# Patient Record
Sex: FEMALE | Race: BLACK OR AFRICAN AMERICAN | NOT HISPANIC OR LATINO | Employment: FULL TIME | URBAN - METROPOLITAN AREA
[De-identification: names, ages, dates, MRNs, and addresses within clinical notes are randomized per-mention and may not be internally consistent; named-entity substitution may affect disease eponyms.]

---

## 2017-04-17 ENCOUNTER — COMMUNICATION - HEALTHEAST (OUTPATIENT)
Dept: SURGERY | Facility: CLINIC | Age: 57
End: 2017-04-17

## 2017-04-17 DIAGNOSIS — K90.9 INTESTINAL MALABSORPTION: ICD-10-CM

## 2017-04-17 DIAGNOSIS — Z98.84 BARIATRIC SURGERY STATUS: ICD-10-CM

## 2017-05-01 ENCOUNTER — OFFICE VISIT - HEALTHEAST (OUTPATIENT)
Dept: SURGERY | Facility: CLINIC | Age: 57
End: 2017-05-01

## 2017-05-01 ENCOUNTER — AMBULATORY - HEALTHEAST (OUTPATIENT)
Dept: LAB | Facility: CLINIC | Age: 57
End: 2017-05-01

## 2017-05-01 DIAGNOSIS — E66.811 OBESITY (BMI 30.0-34.9): ICD-10-CM

## 2017-05-01 DIAGNOSIS — K91.2 POSTOPERATIVE MALABSORPTION: ICD-10-CM

## 2017-05-01 LAB
FASTING STATUS PATIENT QL REPORTED: NORMAL
HBA1C MFR BLD: 5.8 % (ref 4.2–6.1)
HDLC SERPL-MCNC: 88 MG/DL
LDLC SERPL CALC-MCNC: 88 MG/DL

## 2017-05-01 RX ORDER — VENLAFAXINE HYDROCHLORIDE 150 MG/1
1 CAPSULE, EXTENDED RELEASE ORAL DAILY
Refills: 11 | Status: SHIPPED | COMMUNITY
Start: 2017-04-04

## 2017-05-01 RX ORDER — 1.1% SODIUM FLUORIDE PRESCRIPTION DENTAL CREAM 5 MG/G
1 CREAM DENTAL PRN
Refills: 0 | Status: SHIPPED | COMMUNITY
Start: 2017-04-05

## 2017-05-01 RX ORDER — LEVETIRACETAM 1000 MG/1
1 TABLET ORAL 2 TIMES DAILY
Refills: 3 | Status: SHIPPED | COMMUNITY
Start: 2017-04-14

## 2017-05-01 ASSESSMENT — MIFFLIN-ST. JEOR: SCORE: 1466.53

## 2017-05-08 ENCOUNTER — AMBULATORY - HEALTHEAST (OUTPATIENT)
Dept: SURGERY | Facility: CLINIC | Age: 57
End: 2017-05-08

## 2017-05-08 DIAGNOSIS — R79.89 HIGH SERUM PARATHYROID HORMONE (PTH): ICD-10-CM

## 2017-05-08 DIAGNOSIS — K91.2 POSTOPERATIVE MALABSORPTION: ICD-10-CM

## 2017-05-17 ENCOUNTER — RECORDS - HEALTHEAST (OUTPATIENT)
Dept: ADMINISTRATIVE | Facility: OTHER | Age: 57
End: 2017-05-17

## 2017-05-17 ENCOUNTER — RECORDS - HEALTHEAST (OUTPATIENT)
Dept: BONE DENSITY | Facility: CLINIC | Age: 57
End: 2017-05-17

## 2017-05-17 DIAGNOSIS — R79.89 OTHER SPECIFIED ABNORMAL FINDINGS OF BLOOD CHEMISTRY: ICD-10-CM

## 2017-05-17 DIAGNOSIS — K91.2 POSTSURGICAL MALABSORPTION, NOT ELSEWHERE CLASSIFIED: ICD-10-CM

## 2017-08-07 ENCOUNTER — AMBULATORY - HEALTHEAST (OUTPATIENT)
Dept: SURGERY | Facility: CLINIC | Age: 57
End: 2017-08-07

## 2017-08-07 DIAGNOSIS — E66.811 OBESITY (BMI 30.0-34.9): ICD-10-CM

## 2017-08-08 ENCOUNTER — AMBULATORY - HEALTHEAST (OUTPATIENT)
Dept: SURGERY | Facility: CLINIC | Age: 57
End: 2017-08-08

## 2017-08-08 DIAGNOSIS — K90.9 UNSPECIFIED INTESTINAL MALABSORPTION: ICD-10-CM

## 2017-08-08 DIAGNOSIS — E21.3 HYPERPARATHYROIDISM (H): ICD-10-CM

## 2017-08-08 DIAGNOSIS — K91.2 OTHER AND UNSPECIFIED POSTSURGICAL NONABSORPTION: ICD-10-CM

## 2017-08-08 DIAGNOSIS — E66.811 OBESITY (BMI 30.0-34.9): ICD-10-CM

## 2017-08-08 DIAGNOSIS — Z98.84 S/P BARIATRIC SURGERY: ICD-10-CM

## 2017-09-05 ENCOUNTER — AMBULATORY - HEALTHEAST (OUTPATIENT)
Dept: LAB | Facility: HOSPITAL | Age: 57
End: 2017-09-05

## 2017-09-05 DIAGNOSIS — Z98.84 S/P BARIATRIC SURGERY: ICD-10-CM

## 2017-09-05 DIAGNOSIS — K91.2 OTHER AND UNSPECIFIED POSTSURGICAL NONABSORPTION: ICD-10-CM

## 2017-09-05 DIAGNOSIS — E21.3 HYPERPARATHYROIDISM (H): ICD-10-CM

## 2017-09-05 DIAGNOSIS — K90.9 UNSPECIFIED INTESTINAL MALABSORPTION: ICD-10-CM

## 2017-09-08 ENCOUNTER — OFFICE VISIT - HEALTHEAST (OUTPATIENT)
Dept: SURGERY | Facility: CLINIC | Age: 57
End: 2017-09-08

## 2017-09-08 DIAGNOSIS — E66.9 OBESITY: ICD-10-CM

## 2017-09-08 DIAGNOSIS — K91.2 POSTOPERATIVE MALABSORPTION: ICD-10-CM

## 2017-09-08 DIAGNOSIS — E21.3 HYPERPARATHYROIDISM (H): ICD-10-CM

## 2017-09-08 ASSESSMENT — MIFFLIN-ST. JEOR: SCORE: 1498.28

## 2018-03-06 ENCOUNTER — AMBULATORY - HEALTHEAST (OUTPATIENT)
Dept: SURGERY | Facility: CLINIC | Age: 58
End: 2018-03-06

## 2018-03-06 DIAGNOSIS — K91.2 POSTSURGICAL MALABSORPTION: ICD-10-CM

## 2018-03-06 DIAGNOSIS — Z98.84 S/P BARIATRIC SURGERY: ICD-10-CM

## 2018-03-06 DIAGNOSIS — K90.9 INTESTINAL MALABSORPTION, UNSPECIFIED TYPE: ICD-10-CM

## 2018-03-07 ENCOUNTER — COMMUNICATION - HEALTHEAST (OUTPATIENT)
Dept: SURGERY | Facility: CLINIC | Age: 58
End: 2018-03-07

## 2018-03-07 ENCOUNTER — AMBULATORY - HEALTHEAST (OUTPATIENT)
Dept: LAB | Facility: HOSPITAL | Age: 58
End: 2018-03-07

## 2018-03-07 DIAGNOSIS — K90.9 INTESTINAL MALABSORPTION, UNSPECIFIED TYPE: ICD-10-CM

## 2018-03-07 DIAGNOSIS — K91.2 POSTSURGICAL MALABSORPTION: ICD-10-CM

## 2018-03-07 DIAGNOSIS — Z98.84 S/P BARIATRIC SURGERY: ICD-10-CM

## 2018-03-07 LAB
25(OH)D3 SERPL-MCNC: 54.8 NG/ML (ref 30–80)
ALBUMIN SERPL-MCNC: 3.9 G/DL (ref 3.5–5)
ALP SERPL-CCNC: 90 U/L (ref 45–120)
ALT SERPL W P-5'-P-CCNC: 27 U/L (ref 0–45)
ANION GAP SERPL CALCULATED.3IONS-SCNC: 9 MMOL/L (ref 5–18)
AST SERPL W P-5'-P-CCNC: 25 U/L (ref 0–40)
BILIRUB SERPL-MCNC: 0.7 MG/DL (ref 0–1)
BUN SERPL-MCNC: 13 MG/DL (ref 8–22)
CALCIUM SERPL-MCNC: 9.4 MG/DL (ref 8.5–10.5)
CHLORIDE BLD-SCNC: 105 MMOL/L (ref 98–107)
CHOLEST SERPL-MCNC: 168 MG/DL
CO2 SERPL-SCNC: 28 MMOL/L (ref 22–31)
CREAT SERPL-MCNC: 0.7 MG/DL (ref 0.6–1.1)
ERYTHROCYTE [DISTWIDTH] IN BLOOD BY AUTOMATED COUNT: 13.3 % (ref 11–14.5)
FASTING STATUS PATIENT QL REPORTED: YES
FERRITIN SERPL-MCNC: 44 NG/ML (ref 10–130)
FOLATE SERPL-MCNC: 19.9 NG/ML
GFR SERPL CREATININE-BSD FRML MDRD: >60 ML/MIN/1.73M2
GLUCOSE BLD-MCNC: 95 MG/DL (ref 70–125)
HCT VFR BLD AUTO: 39.5 % (ref 35–47)
HDLC SERPL-MCNC: 81 MG/DL
HGB BLD-MCNC: 12.6 G/DL (ref 12–16)
LDLC SERPL CALC-MCNC: 68 MG/DL
MCH RBC QN AUTO: 29.2 PG (ref 27–34)
MCHC RBC AUTO-ENTMCNC: 31.9 G/DL (ref 32–36)
MCV RBC AUTO: 92 FL (ref 80–100)
PLATELET # BLD AUTO: 321 THOU/UL (ref 140–440)
PMV BLD AUTO: 9.9 FL (ref 8.5–12.5)
POTASSIUM BLD-SCNC: 3.7 MMOL/L (ref 3.5–5)
PROT SERPL-MCNC: 7 G/DL (ref 6–8)
PTH-INTACT SERPL-MCNC: 88 PG/ML (ref 10–86)
RBC # BLD AUTO: 4.31 MILL/UL (ref 3.8–5.4)
SODIUM SERPL-SCNC: 142 MMOL/L (ref 136–145)
TRIGL SERPL-MCNC: 96 MG/DL
VIT B12 SERPL-MCNC: >2000 PG/ML (ref 213–816)
WBC: 8.9 THOU/UL (ref 4–11)

## 2018-03-08 LAB — ZINC SERPL-MCNC: 97 UG/DL (ref 60–120)

## 2018-03-09 ENCOUNTER — OFFICE VISIT - HEALTHEAST (OUTPATIENT)
Dept: SURGERY | Facility: CLINIC | Age: 58
End: 2018-03-09

## 2018-03-09 DIAGNOSIS — R53.83: ICD-10-CM

## 2018-03-09 DIAGNOSIS — E66.811 OBESITY (BMI 30.0-34.9): ICD-10-CM

## 2018-03-09 DIAGNOSIS — K91.2 POSTOPERATIVE MALABSORPTION: ICD-10-CM

## 2018-03-09 DIAGNOSIS — R63.2 HYPERPHAGIA: ICD-10-CM

## 2018-03-09 LAB
ANNOTATION COMMENT IMP: NORMAL
VIT A SERPL-MCNC: 0.77 MG/L (ref 0.3–1.2)
VITAMIN A (RETINYL PALMITATE): 0.03 MG/L (ref 0–0.1)

## 2018-03-09 RX ORDER — FLUOCINONIDE 0.5 MG/G
1 CREAM TOPICAL 2 TIMES DAILY
Status: SHIPPED | COMMUNITY
Start: 2017-09-19

## 2018-03-09 RX ORDER — HYDROCORTISONE 2.5 %
1 CREAM (GRAM) TOPICAL PRN
Status: SHIPPED | COMMUNITY
Start: 2017-09-19

## 2018-03-09 ASSESSMENT — MIFFLIN-ST. JEOR: SCORE: 1520.96

## 2018-03-10 LAB — VIT B1 PYROPHOSHATE BLD-SCNC: 146 NMOL/L (ref 70–180)

## 2018-03-30 ENCOUNTER — OFFICE VISIT - HEALTHEAST (OUTPATIENT)
Dept: SURGERY | Facility: CLINIC | Age: 58
End: 2018-03-30

## 2018-03-30 DIAGNOSIS — E66.9 OBESITY (BMI 30-39.9): ICD-10-CM

## 2018-03-30 DIAGNOSIS — Z71.3 NUTRITIONAL COUNSELING: ICD-10-CM

## 2018-03-30 DIAGNOSIS — Z98.84 BARIATRIC SURGERY STATUS: ICD-10-CM

## 2018-03-30 ASSESSMENT — MIFFLIN-ST. JEOR: SCORE: 1518.69

## 2018-05-04 ENCOUNTER — COMMUNICATION - HEALTHEAST (OUTPATIENT)
Dept: SURGERY | Facility: CLINIC | Age: 58
End: 2018-05-04

## 2018-06-04 ENCOUNTER — OFFICE VISIT - HEALTHEAST (OUTPATIENT)
Dept: SURGERY | Facility: CLINIC | Age: 58
End: 2018-06-04

## 2018-06-04 DIAGNOSIS — Z71.3 NUTRITIONAL COUNSELING: ICD-10-CM

## 2018-06-04 DIAGNOSIS — E66.9 OBESITY WITH BODY MASS INDEX OF 30.0-39.9: ICD-10-CM

## 2018-06-04 DIAGNOSIS — Z98.84 BARIATRIC SURGERY STATUS: ICD-10-CM

## 2018-06-04 ASSESSMENT — MIFFLIN-ST. JEOR: SCORE: 1489.21

## 2018-07-13 ENCOUNTER — COMMUNICATION - HEALTHEAST (OUTPATIENT)
Dept: SURGERY | Facility: CLINIC | Age: 58
End: 2018-07-13

## 2018-08-06 ENCOUNTER — OFFICE VISIT - HEALTHEAST (OUTPATIENT)
Dept: SURGERY | Facility: CLINIC | Age: 58
End: 2018-08-06

## 2018-08-06 DIAGNOSIS — E66.9 OBESITY WITH BODY MASS INDEX OF 30.0-39.9: ICD-10-CM

## 2018-08-06 DIAGNOSIS — Z98.84 BARIATRIC SURGERY STATUS: ICD-10-CM

## 2018-08-06 DIAGNOSIS — Z71.3 NUTRITIONAL COUNSELING: ICD-10-CM

## 2018-08-06 ASSESSMENT — MIFFLIN-ST. JEOR: SCORE: 1530.03

## 2018-10-12 ENCOUNTER — COMMUNICATION - HEALTHEAST (OUTPATIENT)
Dept: SURGERY | Facility: CLINIC | Age: 58
End: 2018-10-12

## 2018-10-29 ENCOUNTER — COMMUNICATION - HEALTHEAST (OUTPATIENT)
Dept: SURGERY | Facility: CLINIC | Age: 58
End: 2018-10-29

## 2018-10-29 DIAGNOSIS — E66.811 OBESITY (BMI 30.0-34.9): ICD-10-CM

## 2018-10-29 DIAGNOSIS — R63.2 HYPERPHAGIA: ICD-10-CM

## 2018-11-02 ENCOUNTER — OFFICE VISIT - HEALTHEAST (OUTPATIENT)
Dept: SURGERY | Facility: CLINIC | Age: 58
End: 2018-11-02

## 2018-11-02 DIAGNOSIS — Z71.3 NUTRITIONAL COUNSELING: ICD-10-CM

## 2018-11-02 DIAGNOSIS — E66.9 OBESITY WITH BODY MASS INDEX OF 30.0-39.9: ICD-10-CM

## 2018-11-02 DIAGNOSIS — Z98.84 BARIATRIC SURGERY STATUS: ICD-10-CM

## 2018-11-02 ASSESSMENT — MIFFLIN-ST. JEOR: SCORE: 1534.57

## 2019-05-07 ENCOUNTER — COMMUNICATION - HEALTHEAST (OUTPATIENT)
Dept: SURGERY | Facility: CLINIC | Age: 59
End: 2019-05-07

## 2019-05-07 DIAGNOSIS — E66.811 OBESITY (BMI 30.0-34.9): ICD-10-CM

## 2019-05-07 DIAGNOSIS — R63.2 HYPERPHAGIA: ICD-10-CM

## 2019-06-05 ENCOUNTER — COMMUNICATION - HEALTHEAST (OUTPATIENT)
Dept: SURGERY | Facility: CLINIC | Age: 59
End: 2019-06-05

## 2019-06-05 DIAGNOSIS — K90.9 INTESTINAL MALABSORPTION, UNSPECIFIED: ICD-10-CM

## 2019-06-05 DIAGNOSIS — K91.2 POSTSURGICAL MALABSORPTION: ICD-10-CM

## 2019-06-05 DIAGNOSIS — Z98.84 S/P BARIATRIC SURGERY: ICD-10-CM

## 2019-06-06 ENCOUNTER — AMBULATORY - HEALTHEAST (OUTPATIENT)
Dept: LAB | Facility: HOSPITAL | Age: 59
End: 2019-06-06

## 2019-06-06 DIAGNOSIS — K90.9 INTESTINAL MALABSORPTION, UNSPECIFIED: ICD-10-CM

## 2019-06-06 DIAGNOSIS — Z98.84 S/P BARIATRIC SURGERY: ICD-10-CM

## 2019-06-06 DIAGNOSIS — K91.2 POSTSURGICAL MALABSORPTION: ICD-10-CM

## 2019-06-06 LAB
ALBUMIN SERPL-MCNC: 4.5 G/DL (ref 3.5–5)
ALP SERPL-CCNC: 130 U/L (ref 45–120)
ALT SERPL W P-5'-P-CCNC: 29 U/L (ref 0–45)
ANION GAP SERPL CALCULATED.3IONS-SCNC: 12 MMOL/L (ref 5–18)
AST SERPL W P-5'-P-CCNC: 27 U/L (ref 0–40)
BILIRUB SERPL-MCNC: 0.3 MG/DL (ref 0–1)
BUN SERPL-MCNC: 16 MG/DL (ref 8–22)
CALCIUM SERPL-MCNC: 10.1 MG/DL (ref 8.5–10.5)
CHLORIDE BLD-SCNC: 107 MMOL/L (ref 98–107)
CHOLEST SERPL-MCNC: 204 MG/DL
CO2 SERPL-SCNC: 24 MMOL/L (ref 22–31)
CREAT SERPL-MCNC: 1.05 MG/DL (ref 0.6–1.1)
ERYTHROCYTE [DISTWIDTH] IN BLOOD BY AUTOMATED COUNT: 12.5 % (ref 11–14.5)
FASTING STATUS PATIENT QL REPORTED: NO
FERRITIN SERPL-MCNC: 75 NG/ML (ref 10–130)
FOLATE SERPL-MCNC: 18.8 NG/ML
GFR SERPL CREATININE-BSD FRML MDRD: 54 ML/MIN/1.73M2
GLUCOSE BLD-MCNC: 100 MG/DL (ref 70–125)
HCT VFR BLD AUTO: 38 % (ref 35–47)
HDLC SERPL-MCNC: 85 MG/DL
HGB BLD-MCNC: 12.6 G/DL (ref 12–16)
LDLC SERPL CALC-MCNC: 86 MG/DL
MCH RBC QN AUTO: 29.9 PG (ref 27–34)
MCHC RBC AUTO-ENTMCNC: 33.2 G/DL (ref 32–36)
MCV RBC AUTO: 90 FL (ref 80–100)
PLATELET # BLD AUTO: 323 THOU/UL (ref 140–440)
PMV BLD AUTO: 9.6 FL (ref 8.5–12.5)
POTASSIUM BLD-SCNC: 4 MMOL/L (ref 3.5–5)
PROT SERPL-MCNC: 7.9 G/DL (ref 6–8)
PTH-INTACT SERPL-MCNC: 121 PG/ML (ref 10–86)
RBC # BLD AUTO: 4.21 MILL/UL (ref 3.8–5.4)
SODIUM SERPL-SCNC: 143 MMOL/L (ref 136–145)
TRIGL SERPL-MCNC: 166 MG/DL
VIT B12 SERPL-MCNC: >2000 PG/ML (ref 213–816)
WBC: 9.6 THOU/UL (ref 4–11)

## 2019-06-07 LAB
25(OH)D3 SERPL-MCNC: 41.6 NG/ML (ref 30–80)
HBA1C MFR BLD: 5.9 % (ref 4.2–6.1)

## 2019-06-10 LAB
ANNOTATION COMMENT IMP: NORMAL
VIT A SERPL-MCNC: 1.12 MG/L (ref 0.3–1.2)
VITAMIN A (RETINYL PALMITATE): 0.05 MG/L (ref 0–0.1)
ZINC SERPL-MCNC: 85.2 UG/DL (ref 60–120)

## 2019-06-11 LAB — VIT B1 PYROPHOSHATE BLD-SCNC: 143 NMOL/L (ref 70–180)

## 2019-06-14 ENCOUNTER — OFFICE VISIT - HEALTHEAST (OUTPATIENT)
Dept: SURGERY | Facility: CLINIC | Age: 59
End: 2019-06-14

## 2019-06-14 DIAGNOSIS — K91.2 POSTOPERATIVE MALABSORPTION: ICD-10-CM

## 2019-06-14 DIAGNOSIS — E66.811 OBESITY (BMI 30.0-34.9): ICD-10-CM

## 2019-06-14 DIAGNOSIS — R63.2 HYPERPHAGIA: ICD-10-CM

## 2019-06-14 RX ORDER — CLOTRIMAZOLE 1 %
1 CREAM (GRAM) TOPICAL 2 TIMES DAILY
Refills: 1 | Status: SHIPPED | COMMUNITY
Start: 2019-05-31 | End: 2023-12-06

## 2019-06-14 RX ORDER — CELECOXIB 100 MG/1
1 CAPSULE ORAL 2 TIMES DAILY
Status: SHIPPED | COMMUNITY
Start: 2019-06-03 | End: 2023-06-06

## 2019-06-14 RX ORDER — FLUTICASONE PROPIONATE AND SALMETEROL XINAFOATE 115; 21 UG/1; UG/1
1 AEROSOL, METERED RESPIRATORY (INHALATION) 2 TIMES DAILY
Refills: 11 | Status: SHIPPED | COMMUNITY
Start: 2018-12-01

## 2019-06-14 RX ORDER — ACETAMINOPHEN 500 MG
500-1000 TABLET ORAL PRN
Status: SHIPPED | COMMUNITY
Start: 2019-03-20

## 2019-06-14 ASSESSMENT — MIFFLIN-ST. JEOR: SCORE: 1570.85

## 2019-06-18 ENCOUNTER — OFFICE VISIT - HEALTHEAST (OUTPATIENT)
Dept: SURGERY | Facility: CLINIC | Age: 59
End: 2019-06-18

## 2019-06-18 DIAGNOSIS — E66.812 OBESITY, CLASS II, BMI 35-39.9, ISOLATED (SEE ACTUAL BMI): ICD-10-CM

## 2019-06-18 DIAGNOSIS — Z71.3 NUTRITIONAL COUNSELING: ICD-10-CM

## 2019-06-18 DIAGNOSIS — Z98.84 BARIATRIC SURGERY STATUS: ICD-10-CM

## 2019-06-18 ASSESSMENT — MIFFLIN-ST. JEOR: SCORE: 1573.12

## 2019-07-09 ENCOUNTER — COMMUNICATION - HEALTHEAST (OUTPATIENT)
Dept: SURGERY | Facility: CLINIC | Age: 59
End: 2019-07-09

## 2019-07-30 ENCOUNTER — OFFICE VISIT - HEALTHEAST (OUTPATIENT)
Dept: SURGERY | Facility: CLINIC | Age: 59
End: 2019-07-30

## 2019-07-30 DIAGNOSIS — Z98.84 BARIATRIC SURGERY STATUS: ICD-10-CM

## 2019-07-30 DIAGNOSIS — E66.812 OBESITY, CLASS II, BMI 35-39.9, ISOLATED (SEE ACTUAL BMI): ICD-10-CM

## 2019-07-30 DIAGNOSIS — Z71.3 NUTRITIONAL COUNSELING: ICD-10-CM

## 2019-07-30 ASSESSMENT — MIFFLIN-ST. JEOR: SCORE: 1580.38

## 2019-08-29 ENCOUNTER — OFFICE VISIT - HEALTHEAST (OUTPATIENT)
Dept: SURGERY | Facility: CLINIC | Age: 59
End: 2019-08-29

## 2019-08-29 DIAGNOSIS — E66.812 OBESITY, CLASS II, BMI 35-39.9, ISOLATED (SEE ACTUAL BMI): ICD-10-CM

## 2019-08-29 DIAGNOSIS — Z71.3 NUTRITIONAL COUNSELING: ICD-10-CM

## 2019-08-29 DIAGNOSIS — Z98.84 BARIATRIC SURGERY STATUS: ICD-10-CM

## 2019-08-29 ASSESSMENT — MIFFLIN-ST. JEOR: SCORE: 1552.71

## 2019-10-18 ENCOUNTER — OFFICE VISIT - HEALTHEAST (OUTPATIENT)
Dept: SURGERY | Facility: CLINIC | Age: 59
End: 2019-10-18

## 2019-10-18 DIAGNOSIS — Z71.3 NUTRITIONAL COUNSELING: ICD-10-CM

## 2019-10-18 DIAGNOSIS — E66.812 OBESITY, CLASS II, BMI 35-39.9, ISOLATED (SEE ACTUAL BMI): ICD-10-CM

## 2019-10-18 DIAGNOSIS — Z98.84 BARIATRIC SURGERY STATUS: ICD-10-CM

## 2019-10-18 ASSESSMENT — MIFFLIN-ST. JEOR: SCORE: 1558.61

## 2019-12-17 ENCOUNTER — COMMUNICATION - HEALTHEAST (OUTPATIENT)
Dept: SURGERY | Facility: CLINIC | Age: 59
End: 2019-12-17

## 2019-12-17 DIAGNOSIS — R63.2 HYPERPHAGIA: ICD-10-CM

## 2019-12-17 DIAGNOSIS — E66.811 OBESITY (BMI 30.0-34.9): ICD-10-CM

## 2019-12-18 ENCOUNTER — OFFICE VISIT - HEALTHEAST (OUTPATIENT)
Dept: SURGERY | Facility: CLINIC | Age: 59
End: 2019-12-18

## 2019-12-18 DIAGNOSIS — Z98.84 BARIATRIC SURGERY STATUS: ICD-10-CM

## 2019-12-18 DIAGNOSIS — Z71.3 NUTRITIONAL COUNSELING: ICD-10-CM

## 2019-12-18 DIAGNOSIS — E66.812 OBESITY, CLASS II, BMI 35-39.9, ISOLATED (SEE ACTUAL BMI): ICD-10-CM

## 2019-12-18 ASSESSMENT — MIFFLIN-ST. JEOR: SCORE: 1533.66

## 2020-01-08 ENCOUNTER — OFFICE VISIT - HEALTHEAST (OUTPATIENT)
Dept: SURGERY | Facility: CLINIC | Age: 60
End: 2020-01-08

## 2020-01-08 DIAGNOSIS — N25.81 HYPERPARATHYROIDISM, SECONDARY (H): ICD-10-CM

## 2020-01-08 DIAGNOSIS — K21.9 GERD (GASTROESOPHAGEAL REFLUX DISEASE): ICD-10-CM

## 2020-01-08 DIAGNOSIS — E88.810 METABOLIC SYNDROME: ICD-10-CM

## 2020-01-08 DIAGNOSIS — E11.9 DIABETES TYPE 2, CONTROLLED (H): ICD-10-CM

## 2020-01-08 DIAGNOSIS — K91.2 POSTOPERATIVE MALABSORPTION: ICD-10-CM

## 2020-01-08 DIAGNOSIS — K90.9 MALABSORPTION: ICD-10-CM

## 2020-01-08 DIAGNOSIS — R21 RASH: ICD-10-CM

## 2020-01-08 DIAGNOSIS — M85.80 LOW BONE MASS: ICD-10-CM

## 2020-01-08 DIAGNOSIS — E78.5 DYSLIPIDEMIA: ICD-10-CM

## 2020-01-08 DIAGNOSIS — M19.90 OSTEOARTHRITIS: ICD-10-CM

## 2020-01-08 ASSESSMENT — MIFFLIN-ST. JEOR: SCORE: 1548.17

## 2020-03-16 ENCOUNTER — COMMUNICATION - HEALTHEAST (OUTPATIENT)
Dept: SURGERY | Facility: CLINIC | Age: 60
End: 2020-03-16

## 2020-03-16 ENCOUNTER — AMBULATORY - HEALTHEAST (OUTPATIENT)
Dept: SURGERY | Facility: CLINIC | Age: 60
End: 2020-03-16

## 2020-03-16 DIAGNOSIS — K91.2 POSTOPERATIVE MALABSORPTION: ICD-10-CM

## 2020-03-16 DIAGNOSIS — M85.80 LOW BONE MASS: ICD-10-CM

## 2020-03-25 ENCOUNTER — OFFICE VISIT - HEALTHEAST (OUTPATIENT)
Dept: SURGERY | Facility: CLINIC | Age: 60
End: 2020-03-25

## 2020-03-25 ENCOUNTER — AMBULATORY - HEALTHEAST (OUTPATIENT)
Dept: SCHEDULING | Facility: CLINIC | Age: 60
End: 2020-03-25

## 2020-03-25 DIAGNOSIS — Z98.84 BARIATRIC SURGERY STATUS: ICD-10-CM

## 2020-03-25 DIAGNOSIS — E66.812 OBESITY, CLASS II, BMI 35-39.9, ISOLATED (SEE ACTUAL BMI): ICD-10-CM

## 2020-03-25 DIAGNOSIS — M85.80 LOW BONE MASS: ICD-10-CM

## 2020-03-25 DIAGNOSIS — R21 RASH: ICD-10-CM

## 2020-03-25 DIAGNOSIS — K91.2 POSTOPERATIVE MALABSORPTION: ICD-10-CM

## 2020-03-25 DIAGNOSIS — Z71.3 NUTRITIONAL COUNSELING: ICD-10-CM

## 2020-03-25 ASSESSMENT — MIFFLIN-ST. JEOR: SCORE: 1548.17

## 2020-05-18 ENCOUNTER — COMMUNICATION - HEALTHEAST (OUTPATIENT)
Dept: SURGERY | Facility: CLINIC | Age: 60
End: 2020-05-18

## 2020-05-18 DIAGNOSIS — E66.811 OBESITY (BMI 30.0-34.9): ICD-10-CM

## 2020-05-18 DIAGNOSIS — R63.2 HYPERPHAGIA: ICD-10-CM

## 2020-05-20 ENCOUNTER — COMMUNICATION - HEALTHEAST (OUTPATIENT)
Dept: SCHEDULING | Facility: CLINIC | Age: 60
End: 2020-05-20

## 2020-05-27 ENCOUNTER — OFFICE VISIT - HEALTHEAST (OUTPATIENT)
Dept: SURGERY | Facility: CLINIC | Age: 60
End: 2020-05-27

## 2020-05-27 DIAGNOSIS — Z71.3 NUTRITIONAL COUNSELING: ICD-10-CM

## 2020-05-27 DIAGNOSIS — E66.812 OBESITY, CLASS II, BMI 35-39.9, ISOLATED (SEE ACTUAL BMI): ICD-10-CM

## 2020-05-27 DIAGNOSIS — Z98.84 BARIATRIC SURGERY STATUS: ICD-10-CM

## 2020-05-27 ASSESSMENT — MIFFLIN-ST. JEOR: SCORE: 1539.1

## 2020-06-12 ENCOUNTER — COMMUNICATION - HEALTHEAST (OUTPATIENT)
Dept: SURGERY | Facility: CLINIC | Age: 60
End: 2020-06-12

## 2020-06-12 DIAGNOSIS — E66.811 OBESITY (BMI 30.0-34.9): ICD-10-CM

## 2020-06-12 DIAGNOSIS — R63.2 HYPERPHAGIA: ICD-10-CM

## 2020-08-05 ENCOUNTER — OFFICE VISIT - HEALTHEAST (OUTPATIENT)
Dept: SURGERY | Facility: CLINIC | Age: 60
End: 2020-08-05

## 2020-08-05 DIAGNOSIS — Z71.3 NUTRITIONAL COUNSELING: ICD-10-CM

## 2020-08-05 DIAGNOSIS — Z98.84 BARIATRIC SURGERY STATUS: ICD-10-CM

## 2020-08-05 DIAGNOSIS — E66.812 OBESITY, CLASS II, BMI 35-39.9, ISOLATED (SEE ACTUAL BMI): ICD-10-CM

## 2020-08-05 ASSESSMENT — MIFFLIN-ST. JEOR: SCORE: 1561.78

## 2020-09-15 ENCOUNTER — OFFICE VISIT - HEALTHEAST (OUTPATIENT)
Dept: SURGERY | Facility: CLINIC | Age: 60
End: 2020-09-15

## 2020-09-15 DIAGNOSIS — K91.2 POSTOPERATIVE MALABSORPTION: ICD-10-CM

## 2020-09-15 ASSESSMENT — MIFFLIN-ST. JEOR: SCORE: 1561.78

## 2020-09-23 ENCOUNTER — AMBULATORY - HEALTHEAST (OUTPATIENT)
Dept: LAB | Facility: HOSPITAL | Age: 60
End: 2020-09-23

## 2020-09-23 DIAGNOSIS — K91.2 POSTOPERATIVE MALABSORPTION: ICD-10-CM

## 2020-09-23 LAB
ERYTHROCYTE [DISTWIDTH] IN BLOOD BY AUTOMATED COUNT: 13.3 % (ref 11–14.5)
FERRITIN SERPL-MCNC: 72 NG/ML (ref 10–130)
FOLATE SERPL-MCNC: 18.7 NG/ML
HCT VFR BLD AUTO: 36.8 % (ref 35–47)
HGB BLD-MCNC: 11.7 G/DL (ref 12–16)
MCH RBC QN AUTO: 30 PG (ref 27–34)
MCHC RBC AUTO-ENTMCNC: 31.8 G/DL (ref 32–36)
MCV RBC AUTO: 94 FL (ref 80–100)
PLATELET # BLD AUTO: 352 THOU/UL (ref 140–440)
PMV BLD AUTO: 9.5 FL (ref 8.5–12.5)
PTH-INTACT SERPL-MCNC: 139 PG/ML (ref 10–86)
RBC # BLD AUTO: 3.9 MILL/UL (ref 3.8–5.4)
VIT B12 SERPL-MCNC: >2000 PG/ML (ref 213–816)
WBC: 9.9 THOU/UL (ref 4–11)

## 2020-09-24 LAB — 25(OH)D3 SERPL-MCNC: 47.3 NG/ML (ref 30–80)

## 2020-09-27 LAB
ANNOTATION COMMENT IMP: NORMAL
VIT A SERPL-MCNC: 0.8 MG/L (ref 0.3–1.2)
VIT B1 PYROPHOSHATE BLD-SCNC: 163 NMOL/L (ref 70–180)
VITAMIN A (RETINYL PALMITATE): 0.04 MG/L (ref 0–0.1)
ZINC SERPL-MCNC: 83.7 UG/DL (ref 60–120)

## 2020-09-29 ENCOUNTER — AMBULATORY - HEALTHEAST (OUTPATIENT)
Dept: SURGERY | Facility: CLINIC | Age: 60
End: 2020-09-29

## 2020-09-29 DIAGNOSIS — E56.9 VITAMIN DEFICIENCY: ICD-10-CM

## 2020-09-29 DIAGNOSIS — N25.81 HYPERPARATHYROIDISM, SECONDARY (H): ICD-10-CM

## 2020-09-29 DIAGNOSIS — K91.2 POSTOPERATIVE MALABSORPTION: ICD-10-CM

## 2020-10-05 ENCOUNTER — AMBULATORY - HEALTHEAST (OUTPATIENT)
Dept: SURGERY | Facility: CLINIC | Age: 60
End: 2020-10-05

## 2020-10-05 DIAGNOSIS — K91.2 POSTOPERATIVE MALABSORPTION: ICD-10-CM

## 2020-10-05 DIAGNOSIS — N25.81 HYPERPARATHYROIDISM, SECONDARY (H): ICD-10-CM

## 2020-10-05 DIAGNOSIS — E56.9 VITAMIN DEFICIENCY: ICD-10-CM

## 2021-02-02 ENCOUNTER — COMMUNICATION - HEALTHEAST (OUTPATIENT)
Dept: SURGERY | Facility: CLINIC | Age: 61
End: 2021-02-02

## 2021-02-02 DIAGNOSIS — E56.9 VITAMIN DEFICIENCY: ICD-10-CM

## 2021-02-02 DIAGNOSIS — N25.81 HYPERPARATHYROIDISM, SECONDARY (H): ICD-10-CM

## 2021-02-02 DIAGNOSIS — K91.2 POSTOPERATIVE MALABSORPTION: ICD-10-CM

## 2021-02-08 ENCOUNTER — COMMUNICATION - HEALTHEAST (OUTPATIENT)
Dept: SURGERY | Facility: CLINIC | Age: 61
End: 2021-02-08

## 2021-02-08 DIAGNOSIS — Z98.84 S/P BARIATRIC SURGERY: ICD-10-CM

## 2021-02-08 DIAGNOSIS — K90.9 INTESTINAL MALABSORPTION, UNSPECIFIED: ICD-10-CM

## 2021-02-08 DIAGNOSIS — N25.81 HYPERPARATHYROIDISM, SECONDARY (H): ICD-10-CM

## 2021-03-04 ENCOUNTER — COMMUNICATION - HEALTHEAST (OUTPATIENT)
Dept: SURGERY | Facility: CLINIC | Age: 61
End: 2021-03-04

## 2021-03-04 DIAGNOSIS — N25.81 HYPERPARATHYROIDISM, SECONDARY (H): ICD-10-CM

## 2021-03-04 DIAGNOSIS — K90.9 INTESTINAL MALABSORPTION, UNSPECIFIED: ICD-10-CM

## 2021-03-04 DIAGNOSIS — Z98.84 S/P BARIATRIC SURGERY: ICD-10-CM

## 2021-03-04 DIAGNOSIS — E56.9 VITAMIN DEFICIENCY: ICD-10-CM

## 2021-03-06 ENCOUNTER — COMMUNICATION - HEALTHEAST (OUTPATIENT)
Dept: SURGERY | Facility: CLINIC | Age: 61
End: 2021-03-06

## 2021-03-06 DIAGNOSIS — E66.811 OBESITY (BMI 30.0-34.9): ICD-10-CM

## 2021-03-06 DIAGNOSIS — R63.2 HYPERPHAGIA: ICD-10-CM

## 2021-03-08 ENCOUNTER — AMBULATORY - HEALTHEAST (OUTPATIENT)
Dept: LAB | Facility: HOSPITAL | Age: 61
End: 2021-03-08

## 2021-03-08 DIAGNOSIS — Z98.84 S/P BARIATRIC SURGERY: ICD-10-CM

## 2021-03-08 DIAGNOSIS — K90.9 INTESTINAL MALABSORPTION, UNSPECIFIED: ICD-10-CM

## 2021-03-08 DIAGNOSIS — N25.81 HYPERPARATHYROIDISM, SECONDARY (H): ICD-10-CM

## 2021-03-08 DIAGNOSIS — E56.9 VITAMIN DEFICIENCY: ICD-10-CM

## 2021-03-08 LAB
ALBUMIN SERPL-MCNC: 4.4 G/DL (ref 3.5–5)
ALP SERPL-CCNC: 174 U/L (ref 45–120)
ALT SERPL W P-5'-P-CCNC: 63 U/L (ref 0–45)
ANION GAP SERPL CALCULATED.3IONS-SCNC: 5 MMOL/L (ref 5–18)
AST SERPL W P-5'-P-CCNC: 58 U/L (ref 0–40)
BILIRUB SERPL-MCNC: 0.7 MG/DL (ref 0–1)
BUN SERPL-MCNC: 8 MG/DL (ref 8–22)
CALCIUM SERPL-MCNC: 9.7 MG/DL (ref 8.5–10.5)
CHLORIDE BLD-SCNC: 104 MMOL/L (ref 98–107)
CO2 SERPL-SCNC: 33 MMOL/L (ref 22–31)
CREAT SERPL-MCNC: 0.83 MG/DL (ref 0.6–1.1)
GFR SERPL CREATININE-BSD FRML MDRD: >60 ML/MIN/1.73M2
GLUCOSE BLD-MCNC: 96 MG/DL (ref 70–125)
POTASSIUM BLD-SCNC: 3.8 MMOL/L (ref 3.5–5)
PROT SERPL-MCNC: 7.6 G/DL (ref 6–8)
PTH-INTACT SERPL-MCNC: 132 PG/ML (ref 10–86)
SODIUM SERPL-SCNC: 142 MMOL/L (ref 136–145)

## 2021-03-09 LAB — 25(OH)D3 SERPL-MCNC: 51 NG/ML (ref 30–80)

## 2021-03-12 ENCOUNTER — OFFICE VISIT - HEALTHEAST (OUTPATIENT)
Dept: SURGERY | Facility: CLINIC | Age: 61
End: 2021-03-12

## 2021-03-12 DIAGNOSIS — E88.810 METABOLIC SYNDROME: ICD-10-CM

## 2021-03-12 DIAGNOSIS — K91.2 POSTOPERATIVE MALABSORPTION: ICD-10-CM

## 2021-03-12 DIAGNOSIS — E66.811 OBESITY (BMI 30.0-34.9): ICD-10-CM

## 2021-03-12 DIAGNOSIS — Z98.84 BARIATRIC SURGERY STATUS: ICD-10-CM

## 2021-03-12 DIAGNOSIS — E66.811 OBESITY, CLASS I, BMI 30.0-34.9 (SEE ACTUAL BMI): ICD-10-CM

## 2021-03-12 DIAGNOSIS — R63.2 HYPERPHAGIA: ICD-10-CM

## 2021-03-12 DIAGNOSIS — Z71.3 NUTRITIONAL COUNSELING: ICD-10-CM

## 2021-03-12 RX ORDER — TRAZODONE HYDROCHLORIDE 50 MG/1
TABLET, FILM COATED ORAL
Status: SHIPPED | COMMUNITY
Start: 2021-03-08

## 2021-03-12 RX ORDER — PHENTERMINE HYDROCHLORIDE 37.5 MG/1
TABLET ORAL
Qty: 90 TABLET | Refills: 1 | Status: SHIPPED | OUTPATIENT
Start: 2021-03-12 | End: 2021-09-23

## 2021-03-12 ASSESSMENT — MIFFLIN-ST. JEOR: SCORE: 1412.09

## 2021-04-06 ENCOUNTER — COMMUNICATION - HEALTHEAST (OUTPATIENT)
Dept: SURGERY | Facility: CLINIC | Age: 61
End: 2021-04-06

## 2021-04-06 DIAGNOSIS — N25.81 HYPERPARATHYROIDISM, SECONDARY (H): ICD-10-CM

## 2021-04-06 DIAGNOSIS — K90.9 INTESTINAL MALABSORPTION, UNSPECIFIED: ICD-10-CM

## 2021-04-06 DIAGNOSIS — Z98.84 S/P BARIATRIC SURGERY: ICD-10-CM

## 2021-05-29 NOTE — PROGRESS NOTES
Here for 5 yrs s/p RNY f/u visit.  See flowsheet.  Pt recently had her left knee replaced.  Labs done and d/w pt today.    Sandy Delacruz RN, CBN  [facility]  P [phone]  F [phone]

## 2021-05-29 NOTE — PATIENT INSTRUCTIONS - HE
[facility]  Nutritional Guidelines  Gastric Bypass 18 Months Post Op and Beyond    General Guidelines and Helpful Hints:    Eat 3 meals per day + protein supplement(s). No snacks between meals.  o Do not skip meals.  This can cause overeating at the next meal and will prevent adequate protein and nutritional intake.    Aim for 60-80 grams of protein per day.  o Always eat your protein first. This assists with optimal nutrition and helps you stay full longer.  o Depending on your portion size, you may need to drink approved protein supplement between meals to achieve protein goals. Follow recommendations of your Dietitian.     Eat your protein first, and then follow with fiber.   o It is not necessary to count your fiber, but 15-20 grams per day is recommended.    o Add fiber by including fruits, vegetables, whole grains, and beans.     Portions should remain about 1 cup per meal. Use measuring cups to be accurate.    Continue to use saucer/salad plates, infant/toddler silverware to keep portion sizes small and take small bites.    Eat S-L-O-W-L-Y to make each meal last 20-30 minutes. Always stop eating when satisfied.    Continue to use caution with foods containing skins, peels or membranes. Chew well!    Aim for 64 oz. of calorie-free fluids daily.  o Continue to avoid caffeine and carbonation. If you choose to drink alcohol, do so in moderation.   o Remember to avoid drinking during meals, 15-30 minutes before and 30 minutes after.    Exercise is knapp for continued weight loss and weight maintenance. Aim for 30-60 minutes of physical activity most days of the week. Include cardiovascular and strength training.    If having trouble tolerating meat, try using a crock-pot, tinfoil tent, steamer or other moist cooking method to create tender meats. Add broth or low-fat gravy to help meat stay moist.     Avoid high sugar and high fat foods to prevent dumping syndrome.  o Check nutrition labels for less  than 10 grams of sugar and less than 10 grams of fat per serving.    Continue Taking Vitamins/Minerals:  o 3800-4568 mcg of Sublingual B-12 daily  o 1 Multivitamin with Iron twice daily (chewable or swallow tabs)  o 500-600 mg Calcium Citrate twice daily (chewable or swallow tabs)  o 5000 IU Vitamin D3 daily    Sample Grocery List    Protein:    Fat free Greek or light yogurt (less than 10 grams sugar)    Fat free or low-fat cottage cheese    String cheese or reduced fat cheese slices    Tuna, salmon, crab, egg, or chicken salad made with light or fat free mayonnaise    Egg or Egg Substitute    Lean/extra lean turkey, beef, bison, venison (ground, sirloin, round, flank)    Pork loin or tenderloin (grilled, baked, broiled)    Fish such as salmon, tuna, trout, tilapia, etc. (grilled, baked, broiled)    Tender cuts of lean (skinless) turkey or chicken    Lean deli meats: turkey, lean ham, chicken, lean roast beef    Beans such as kidney, garbanzo, black, casey, or low-fat/fat free refried beans    Peanut butter (natural preferred). Limit to 1 Tbsp. per day.    Low-fat meatloaf (made with lean ground beef or turkey)    Sloppy Joes made with low-sugar ketchup and lean ground beef or turkey    Soy or vegetable protein (i.e. vegan crumbles, soy/veggie burger, tofu)    Hummus    Vegetables:    Fresh: cooked or raw (as tolerated)    Frozen vegetables    Canned vegetables (low sodium or no salt added, rinse before cooking/eating)    (Ok to have skins/peels/membranes/seeds - just chew well)    Fruits:    Fresh fruit    Frozen fruit (no sugar added)    Canned fruit (packed in its own juice, NOT syrup)    (Ok to have skins/peels/membranes/seeds - just chew well)    Starch:    Unsweetened whole-grain hot cereal (or high fiber cold cereal, dry)    Toasted whole wheat bread or [place] Thins    Whole grain crackers    Baked /boiled/mashed potato/sweet potato    Cooked whole grain pasta, brown rice, or other cooked whole  grains    Starchy vegetables: corn, peas, winter squash    Protein Supplement:     Ready to drink protein shake with:  o 15-30 grams protein per serving  o Less than 10 grams total carbohydrate per serving     Protein powder mixed with:  o  Skim or 1% milk  o Low fat or fat free Lactaid milk, plain or no sugar added soymilk  o Water     Fats: (use in moderation)    1 teaspoon of soft tub margarine    1 teaspoon olive oil, canola oil, or peanut oil    1 tablespoon of low-fat álvarez or salad dressing     Sample Menu for 18+ months after Gastric Bypass    You do NOT need to eat/drink the full portion sizes listed below  Always stop when you are satisfied    Breakfast   cup 1% cottage cheese     cup mixed berries   Lunch 2 oz lean roast beef on   [place] Thin with 1 tsp. light álvarez    small tomato, chopped, mixed with 1 tsp. light vinaigrette dressing   Supplement Approved protein supplement (if needed between meals)   Dinner 2 oz grilled salmon    cup salad greens with 1 tsp. light salad dressing and 1 tsp. ground flax seed    cup quinoa or brown rice     Breakfast   cup egg substitute with   cup sautéed chopped vegetables  2 light [place] Krisp crackers   Lunch Tuna Melt:   cup tuna mixed with 1 tsp. light álvarez over   [place] Thin. Top with 2-3 slices cucumber and 1 oz slice of low fat cheese   Supplement 1 cup skim milk (if needed between meals)   Dinner 3 oz  grilled, broiled, or baked seasoned skinless chicken breast    cup asparagus     Breakfast   cup plain oatmeal made with skim or 1% milk with 1 Tbsp. flavored/unflavored protein powder added  1 mozzarella string cheese   Lunch 2 oz deli turkey breast  1/3 cup salad with 1 tsp. light salad dressing, 1/8 of a whole avocado and 1 Tbsp. sunflower seeds   Dinner 3 oz. pork loin made in a crock pot, seasoned with a spice rub    cup cooked carrots   Supplement Approved protein supplement (if needed between meals)     Breakfast 1 cup breakfast casserole made with egg  substitute, turkey sausage,  and steamed, chopped bell peppers   Supplement  1 cup light Greek yogurt (if needed between meals)   Lunch 2 oz. teriyaki turkey    cup mashed sweet potato with 1-2 spritzes of spray butter (like [place])    cup fresh pineapple   Dinner 3 oz low fat meatloaf    cup roasted garlic zucchini     Breakfast   cup leftover breakfast casserole    cup no sugar added applesauce with 1 Tbsp. unflavored protein powder and a sprinkle of cinnamon    Lunch 3 oz shrimp with 1-2 Tbsp. low-sugar cocktail sauce for dipping    c. whole wheat pasta drizzled with   tsp. olive oil   Supplement 1 cup skim/1% milk with scoop of protein powder (if needed between meals)   Dinner Grilled, seasoned kebob with 2 oz lean beef and   cup vegetables     Breakfast Breakfast pizza:   [place] Thin spread with 1 Tbsp. low sugar spaghetti sauce,   cup shredded low fat cheese, melted and 1 slice of [place] alvarez     cup fresh fruit mixed with chopped almonds   Lunch   cup black bean soup  4-5 whole grain crackers   Dinner 3 oz  tilapia with lemon pepper seasoning    cup stewed tomatoes   Supplement 1 string cheese (if needed between meals)     Breakfast 2 hard boiled eggs (discard 1 egg yolk)    whole wheat English Muffin with 1 tsp. low sugar jelly   Lunch   cup leftover black bean soup topped with 1-2 Tbsp. low fat cheese  2-3 light [place] Krisp crackers   Supplement Approved protein supplement (if needed between meals)   Dinner 3 oz sirloin steak    cup steamed broccoli

## 2021-05-29 NOTE — TELEPHONE ENCOUNTER
----- Message from Evelia Granda sent at 2019  4:34 PM CDT -----  Regardin yr labs  Pt is scheduled for her 5 yr f/u with Dr Coronado on  in the [facility]. She will have labs drawn at a HE facility prior to this appt. Please place labs orders on the account

## 2021-05-29 NOTE — PROGRESS NOTES
"Post-op Surgical Weight Loss Diet Evaluation     Assessment:  Pt presents for 5+ years post-op RD visit, s/p RYGB on 4- with Dr. Sanchez. Today we reviewed current eating habits and level of physical activity, and instructed on the changes that are required for successful bariatric outcomes.    Patient Progress: patient reports 5 deaths in the family in the past two years, including her mother. She states she is stressed and this has caused over eating- mostly with sweets. Patient would like to lose weight, her lowest weight post op was 180lb. Her goal is 150lb. Patient is taking phentermine.   Yuliana had been following with Daniella De Jesus RD at the [facility] location.     Pt's Initial Weight: 233 lbs  Weight: 220 lb 8 oz (100 kg) (patient wearing knee brace weighing 3#)  Weight loss from initial: 12.5  % Weight loss: 5.36 %      Body mass index is 37.26 kg/m .     Concerns: patient is skipping meals and choosing foods that are higher in fat     Vitamins   Multi Vit with Iron: yes  Calcium Citrate: yes  B12: yes  D3: yes    Do you experience hunger? no  Do you have \"dumping\" syndrome?  none  Do you experience any reflux or discomfort with eating? none    Diet Recall/Time:   Breakfast: couple sips of coffee, couple bites of egg and cheese burrito- maverick size  Dinner: turkey, sweet potatoes, and stuffing     Proteins/Veg/Fruits/CHO (NOT well tolerated): tries to stay away from rice and small pieces of pasta    Estimated portion size per meals:1/2-1 cup/meal    Incorporation of vegetables, fruits, carbohydrates into diet/meals using   Bariatric \"Plate Method\"   The patient and I discussed the importance of including lean/low fat protein at each meal, including a vegetable/fruit, and limiting carbohydrate intake to less than 25% of plate volume. Always keeping within approved perimeters of post op meal portion sizes according to 3/6/9/12 months post op guidelines.    Fluid-meal separation:  Fluids are  30min " "before and 30 minutes after meals.  The patient and I reviewed the anatomy of the bypass and why  fluids from a meal is so important.    Fluid Intake  Water: 34oz per day    Discussed the importance of adequate hydration after surgery and the goal of 64+ oz of fluid daily.   The patient understands the importance of avoiding all carbonated, caffeinated, and sweetened drinks; and instead choosing 64oz plain water.    Exercise  Type: On feet at work- working up to 10 hour shifts    Pt's understands that 30-60 minutes of daily activity is an important part of bariatric surgery success.   Encouraged pt to incorporate strength training exercise along with cardiovascular exercise as well, most days of the week.      PES statement:    1. (NI-5.7.1) Inadequate protein intake related to Gastric bypass causing increased nutrient needs due to malabsorption/ Decreased ability to consume sufficient protein as evidenced by Estimated intake of protein insufficient to meet requirements      Intervention    Discussion  1. Discussed eating three meals each day, focusing on eating protein first  2. Protein supplements were encouraged if patient feels as though she will miss a meal    Instructions  1. Include 15-20gm protein at each meal, along with protein supplement/\"planned protein containing snack\" of 15-30gm protein, to reach goal of 60-80 gm protein daily.  2. Increase fluid intake to 64oz daily: choose plain or calorie/carbonation-free beverages.  3. Incorporate daily structured activity, 30-60 minutes most days of the week  4. Recommended pt to start taking: Multi Vit + iron 2x/day, calcium citrate 400-600 mg 2x/day, 3156-1527 mcg of Sublingual B-12 daily, and 5000 IU Vitamin D3 daily. (MVI and calcium can be taken at the same time)  5. Read food labels more consistently: keeping total fat grams <10, total sugar grams <10, fiber >3gm per serving.  6. Increase vegetable/fruit intake, by having a vegetable or fruit with " each meal daily.  7. Practice plate method: 1/2 plate lean/low fat protein source, vegetable/fruit, <25% of plate complex carbohydrates.  8. Separate fluids 30 minutes before/after meal times.  9. Practice eating off of smaller plates/bowls, chewing to applesauce consistency, taking 20-30 minutes to eat in a calm/relaxed environment without distractions of tv/email/cell phone.    Handouts provided:  Bariatric Plate  Food journal  Lean Protein sources    Monitor/Evaluation    Pt to follow up in one month with RD      Time In: 1:30p  Time Out: 2:10p      ABN signed: Yes

## 2021-05-29 NOTE — PROGRESS NOTES
Bariatric Follow Up Visit with a History of Previous Bariatric Surgery     Date of visit: 6/14/2019  Physician: Dorothy Coronado MD  Primary Care Provider:  Mayda Steward MD Tracy L Clark   58 y.o.  female    Date of Surgery: 4/23/2014  Initial Weight: 231#  Initial BMI: 38.9  Today's Weight:   Wt Readings from Last 1 Encounters:   06/14/19 220 lb (99.8 kg)     Body mass index is 37.18 kg/m .      Assessment and Plan     Assessment: Yuliana is a 58 y.o. year old female who is 5 yrs s/p  Jose Daniel en Y Gastric Bypass with Dr. Laura Armendariz feels as if she has not achieved the goals she hoped to accomplish through bariatric surgery and weight loss.    Encounter Diagnoses   Name Primary?     Postoperative malabsorption Yes     Hyperphagia      Obesity (BMI 30.0-34.9)          Current Outpatient Medications:      acetaminophen (TYLENOL) 500 MG tablet, Take 500-1,000 mg by mouth as needed., Disp: , Rfl:      ADVAIR -21 mcg/actuation inhaler, Inhale 1 puff 2 (two) times a day., Disp: , Rfl: 11     albuterol (PROVENTIL HFA;VENTOLIN HFA) 90 mcg/actuation inhaler, Inhale 2 puffs every 6 (six) hours as needed for wheezing., Disp: , Rfl:      albuterol (PROVENTIL) 2.5 mg /3 mL (0.083 %) nebulizer solution, Take 2.5 mg by nebulization every 6 (six) hours as needed for wheezing., Disp: , Rfl:      amLODIPine (NORVASC) 5 MG tablet, Take 2.5 mg by mouth daily. , Disp: , Rfl:      atorvastatin (LIPITOR) 20 MG tablet, Take 20 mg by mouth bedtime., Disp: , Rfl:      benzonatate (TESSALON) 100 MG capsule, Take 1-2 capsules by mouth as needed., Disp: , Rfl:      CALCIUM CITRATE/VITAMIN D3 (CITRACAL + D PETITES ORAL), Take 2 tablets by mouth 2 (two) times a day. , Disp: , Rfl:      carvedilol (COREG) 3.125 MG tablet, Take 3.125 mg by mouth bedtime. , Disp: , Rfl:      celecoxib (CELEBREX) 100 MG capsule, Take 1 capsule by mouth 2 (two) times a day., Disp: , Rfl:      cholecalciferol, vitamin D3, 5,000 unit capsule,  Take 1 capsule by mouth daily., Disp: , Rfl: 0     clotrimazole (LOTRIMIN) 1 % cream, Apply 1 application topically 2 (two) times a day., Disp: , Rfl: 1     cyanocobalamin, vitamin B-12, 1,000 mcg Subl, Place 1 tablet (1,000 mcg total) under the tongue daily., Disp: 90 tablet, Rfl: prn     diphenhydrAMINE (BENADRYL) 25 mg capsule, Take 25-50 mg by mouth as needed., Disp: , Rfl:      fluocinonide (LIDEX) 0.05 % cream, Apply 1 application topically 2 (two) times a day., Disp: , Rfl:      gabapentin (NEURONTIN) 300 MG capsule, Take 300 mg by mouth 3 (three) times a day., Disp: , Rfl:      hydrOXYzine pamoate (VISTARIL) 25 MG capsule, Take 1-2 capsules by mouth as needed., Disp: , Rfl:      levETIRAcetam (KEPPRA) 1000 MG tablet, Take 1 tablet by mouth 2 (two) times a day., Disp: , Rfl: 3     losartan (COZAAR) 50 MG tablet, Take 50 mg by mouth daily. Two tabs daily, Disp: , Rfl:      miconazole (MICOTIN) 2 % powder, Apply 1 application topically as needed for itching (for rash under breasts)., Disp: , Rfl:      montelukast (SINGULAIR) 10 mg tablet, Take 10 mg by mouth bedtime., Disp: , Rfl:      omeprazole (PRILOSEC) 20 MG capsule, Take 20 mg by mouth 2 (two) times a day. , Disp: , Rfl:      pediatric multivit-iron-min (CEROVITE JR) Chew, Chew 1 tablet 2 (two) times a day., Disp: 180 each, Rfl: 3     phentermine (ADIPEX-P) 37.5 mg tablet, Take 0.5-1 tablets (18.75-37.5 mg total) by mouth daily before breakfast., Disp: 90 tablet, Rfl: 1     SF 5000 PLUS 1.1 % Crea, Apply 1 application topically as needed., Disp: , Rfl: 0     venlafaxine (EFFEXOR-XR) 150 MG 24 hr capsule, Take 1 capsule by mouth daily., Disp: , Rfl: 11     hydrocortisone 2.5 % cream, Apply 1 application topically as needed., Disp: , Rfl:      VIRTUSSIN AC  mg/5 mL liquid, Take 5 mL by mouth every 4 (four) hours as needed., Disp: , Rfl: 1    Plan: Continue vitamins as is. Refill phentermine. Dietitian visit will be helpful. Will recheck PTH, D and CMP  "in 6 mo.    Return in about 6 months (around 12/14/2019).    Bariatric Surgery Review     Interim History/LifeChanges: High stress with multiple deaths in the family. Taking vitamins with consistency. Left knee replacement after a meniscus surgery.     Patient Concerns: weight gain  Appetite (1-10): OK  GERD: on PPI    Medication changes: celebrex, gabapentin    Vitamin Intake:   B-12   SL   MVI  2/d   Vitamin D  5,000   Calcium   citrate     Other                LABS: \"Reviewed  PTH high/renal function impaired and ortho procedures  Nausea no  Vomiting no  Constipation no  Diarrhea no  Rashes no  Hair Loss no  Calf tenderness no  Breathing difficulty no  Reactive Hypoglycemia would have but doesn't push it  Light Headedness no   Moods post menopausal, grieving    12 point ROS as above and otherwise negative      Habits:  Alcohol: no  Tobacco: no  Caffeine rare  NSAIDS clebrex  Exercise Routine: PT for knee  3 meals/day 3   Protein first yes B: egg, cheese, 1/2 croissant L: bean burrito cheese, water D: baked chicken  60 grams/day  Water Separate from meals yes  Calorie Containing Beverages no  Restaurant eating/wk rare  Sleeping 8-9 hours  Stress high  CPAP: NA  Contraception: NA    Social History     Social History     Socioeconomic History     Marital status: Single     Spouse name: Not on file     Number of children: Not on file     Years of education: Not on file     Highest education level: Not on file   Occupational History     Not on file   Social Needs     Financial resource strain: Not on file     Food insecurity:     Worry: Not on file     Inability: Not on file     Transportation needs:     Medical: Not on file     Non-medical: Not on file   Tobacco Use     Smoking status: Former Smoker     Smokeless tobacco: Never Used     Tobacco comment: quit over 25 years ago   Substance and Sexual Activity     Alcohol use: Yes     Comment: Rarely     Drug use: No     Sexual activity: Not Currently     Partners: Male "     Birth control/protection: Surgical   Lifestyle     Physical activity:     Days per week: Not on file     Minutes per session: Not on file     Stress: Not on file   Relationships     Social connections:     Talks on phone: Not on file     Gets together: Not on file     Attends Congregational service: Not on file     Active member of club or organization: Not on file     Attends meetings of clubs or organizations: Not on file     Relationship status: Not on file     Intimate partner violence:     Fear of current or ex partner: Not on file     Emotionally abused: Not on file     Physically abused: Not on file     Forced sexual activity: Not on file   Other Topics Concern     Not on file   Social History Narrative     Not on file       Past Medical History     Past Medical History:   Diagnosis Date     Asthma      Back pain      Congestive heart failure (H)      COPD (chronic obstructive pulmonary disease) (H)      Degenerative disc disease      Diabetes type 2, controlled (H)      Dyslipidemia      GERD (gastroesophageal reflux disease)      Gout      Hyperparathyroidism (H)      Hyperparathyroidism, secondary (H)      Hypertension      Low bone mass      Metabolic syndrome      Morbid obesity (H)      Osteoarthritis      Reflux      Reflux      Problem List     Patient Active Problem List   Diagnosis     Fatigue     GERD (gastroesophageal reflux disease)     Degenerative disc disease     Osteoarthritis     Gout     Asthma     COPD (chronic obstructive pulmonary disease) (H)     Hypertension     Back pain     Metabolic syndrome     Hyperparathyroidism, secondary (H)     Dyslipidemia     Hyperparathyroidism (H)     Low bone mass     Medications     Current Outpatient Medications   Medication Sig Note     acetaminophen (TYLENOL) 500 MG tablet Take 500-1,000 mg by mouth as needed.      ADVAIR -21 mcg/actuation inhaler Inhale 1 puff 2 (two) times a day.      albuterol (PROVENTIL HFA;VENTOLIN HFA) 90 mcg/actuation  inhaler Inhale 2 puffs every 6 (six) hours as needed for wheezing.      albuterol (PROVENTIL) 2.5 mg /3 mL (0.083 %) nebulizer solution Take 2.5 mg by nebulization every 6 (six) hours as needed for wheezing.      amLODIPine (NORVASC) 5 MG tablet Take 2.5 mg by mouth daily.       atorvastatin (LIPITOR) 20 MG tablet Take 20 mg by mouth bedtime.      benzonatate (TESSALON) 100 MG capsule Take 1-2 capsules by mouth as needed. 3/9/2018: Received from: External Pharmacy     CALCIUM CITRATE/VITAMIN D3 (CITRACAL + D PETITES ORAL) Take 2 tablets by mouth 2 (two) times a day.       carvedilol (COREG) 3.125 MG tablet Take 3.125 mg by mouth bedtime.       celecoxib (CELEBREX) 100 MG capsule Take 1 capsule by mouth 2 (two) times a day.      cholecalciferol, vitamin D3, 5,000 unit capsule Take 1 capsule by mouth daily. 5/1/2017: Received from: External Pharmacy Received Sig: TK 1 C PO QD     clotrimazole (LOTRIMIN) 1 % cream Apply 1 application topically 2 (two) times a day.      cyanocobalamin, vitamin B-12, 1,000 mcg Subl Place 1 tablet (1,000 mcg total) under the tongue daily.      diphenhydrAMINE (BENADRYL) 25 mg capsule Take 25-50 mg by mouth as needed.      fluocinonide (LIDEX) 0.05 % cream Apply 1 application topically 2 (two) times a day. 3/9/2018: Received from: [facility] Received Sig: Apply  topically two times a day.     gabapentin (NEURONTIN) 300 MG capsule Take 300 mg by mouth 3 (three) times a day.      hydrOXYzine pamoate (VISTARIL) 25 MG capsule Take 1-2 capsules by mouth as needed.      levETIRAcetam (KEPPRA) 1000 MG tablet Take 1 tablet by mouth 2 (two) times a day. 5/1/2017: Received from: External Pharmacy Received Sig: TK 1 T PO BID     losartan (COZAAR) 50 MG tablet Take 50 mg by mouth daily. Two tabs daily      miconazole (MICOTIN) 2 % powder Apply 1 application topically as needed for itching (for rash under breasts).      montelukast (SINGULAIR) 10 mg tablet Take 10 mg by mouth bedtime.       "omeprazole (PRILOSEC) 20 MG capsule Take 20 mg by mouth 2 (two) times a day.       pediatric multivit-iron-min (CEROVITE JR) Chew Chew 1 tablet 2 (two) times a day.      phentermine (ADIPEX-P) 37.5 mg tablet Take 0.5-1 tablets (18.75-37.5 mg total) by mouth daily before breakfast.      SF 5000 PLUS 1.1 % Crea Apply 1 application topically as needed. 5/1/2017: Received from: External Pharmacy     venlafaxine (EFFEXOR-XR) 150 MG 24 hr capsule Take 1 capsule by mouth daily. 5/1/2017: Received from: External Pharmacy Received Sig: TK ONE C PO QD     hydrocortisone 2.5 % cream Apply 1 application topically as needed. 3/9/2018: Received from: [facility] Received Sig: Apply to inflamed skin 2-3 times daily until clear but not more than 2-3 weeks at a time     VIRTUSSIN AC  mg/5 mL liquid Take 5 mL by mouth every 4 (four) hours as needed. 3/9/2018: Received from: External Pharmacy Received Sig: TK 5 ML PO Q 4 H PRF COUGH     Surgical History     Past Surgical History  She has a past surgical history that includes Jose Daniel-en-y procedure (4/23/2014); Cholecystectomy; Hysterectomy; Tubal ligation; Total knee arthroplasty (Left, 03/2019); and Knee arthroscopy w/ meniscal repair (Left, 01/2019).    Objective-Exam     Constitutional:  /80   Pulse 85   Resp 18   Ht 5' 4.5\" (1.638 m)   Wt 220 lb (99.8 kg)   BMI 37.18 kg/m    Height: 5' 4.5\" (1.638 m) (6/14/2019  3:51 PM)  Initial Weight: 233 lbs (6/14/2019  3:51 PM)  Weight: 220 lb (99.8 kg) (6/14/2019  3:51 PM)  Weight loss from initial: 13 (6/14/2019  3:51 PM)  % Weight loss: 5.58 % (6/14/2019  3:51 PM)  BMI (Calculated): 37.2 (6/14/2019  3:51 PM)  NSAIDS: Yes (6/14/2019  3:51 PM)    General:  Pleasant and in no acute distress   Eyes:  EOMI  ENT:  Airway 3+  Moist mucous membranes  Neck:  Supple, No LAD, No thyromegaly, No carotid bruits appreciated  Respiratory: Normal respiratory effort, no cough, wheezes or crackles  CV:  Regular rate and Rhythm,no murmurs, " pulses 2+, no calf tenderness, bilateral LE edema, left knee brace and compresssions  Gastrointestinal: Abdomen NT/ND, BS+  Musculoskeletal: muscle mass WNL  Skin: color baseline and pink hair full, incisions nicely healed  Neurological: No tremor, normal gait  Psychiatric: alert and oriented X3, mood and affect normal    Counseling     We reviewed the important post op bariatric recommendations:  -eating 3 meals daily  -eating protein first, getting >60gm protein daily  -eating slowly, chewing food well  -avoiding/limiting calorie containing beverages  -drinking water 15-30 minutes before or after meals  -choosing wheat, not white with breads, crackers, pastas, micheal, bagels, tortillas, rice  -limiting restaurant or cafeteria eating to twice a week or less    We discussed the importance of restorative sleep and stress management in maintaining a healthy weight.  We discussed the National Weight Control Registry healthy weight maintenance strategies and ways to optimize metabolism.  We discussed the importance of physical activity including cardiovascular and strength training in maintaining a healthier weight.    We discussed the importance of life-long vitamin supplementation and life-long  follow-up.    Yuliana was reminded that, to avoid marginal ulcers she should avoid tobacco at all, alcohol in excess, caffeine in excess, and NSAIDS (unless indicated for cardioprotection or othewise and opposed by a PPI).    Dorothy Coronado MD, [facility].  6/14/2019  4:13 PM      No images are attached to the encounter.   30 minutes spent with patient. >50% in counseling and coordination of care.

## 2021-05-29 NOTE — TELEPHONE ENCOUNTER
5 yrs post op lab orders placed for patient in preparation for appointment with  in June.  Called pt to remind her and she will have them drawn tomorrow at [facility].    Sandy Delacruz RN, N  [facility]  P [phone]  F [phone]

## 2021-05-30 VITALS — HEIGHT: 65 IN | WEIGHT: 197 LBS | BODY MASS INDEX: 32.82 KG/M2

## 2021-05-30 NOTE — PROGRESS NOTES
Medical  Weight Loss Follow-Up Diet Evaluation  Assessment:  Yuliana is presenting today for a follow up weight management nutrition consultation. Pt has had an initial appointment with Dr. Coronado.  Weight loss medication: Phentermine.   Pt's Initial Weight: 233 lbs  Weight: 222 lb 1.6 oz (100.7 kg)  Weight loss from initial: 10.9  % Weight loss: 4.68 %    BMI: Body mass index is 37.53 kg/m .  IBW: 120-130 lbs    Estimated RMR ([place]-St Jeor equation): 1590kcal   Recommended Protein Intake: 60-80 grams of protein/day  Patient Active Problem List:  Patient Active Problem List   Diagnosis     Fatigue     GERD (gastroesophageal reflux disease)     Degenerative disc disease     Osteoarthritis     Gout     Asthma     COPD (chronic obstructive pulmonary disease) (H)     Hypertension     Back pain     Metabolic syndrome     Hyperparathyroidism, secondary (H)     Dyslipidemia     Hyperparathyroidism (H)     Low bone mass     Progress on goals from last visit: patient reports that she has been limiting eating out and has been watching her portion sizes    Dietary Recall:  Breakfast: biscuit with peanut butter and a banana  Snack: piece of fruit and Greek yogurt  Lunch: 1/2 chicken breast on a slice of wheat bread  Snack: none  Dinner: 1/2 chicken breast with frozen mixed vegetables, 1/2 slice of wheat bread  Snack: none  Overnight eating: No  Eating out (frequency/week): not any more  Hydration (type/oz. per day):  Water: 4-17oz bottles of water  Caffeine: none  Carbonation: none  Juice: none  Alcohol : none  Exercise:  Routine exercise established: Yes  Walking around the park with her dog daily.      Nutrition Diagnosis:    (NB-1.7) Undesirable food choices related to lack of motivation and/or readiness to apply change as evidenced by inaccurate or incomplete understanding of the bariatric guidelines; inability to apply guideline information; inability to select, or unwillingness or disinterest in selecting, food consistent  with the bariatric guidelines      Intervention:  1. Recommend calorie/nutrient modification    Implementation:  1. Reviewed progress with previous goals  2. Nutrition Education on label reading for serving size, fat, sodium, fiber, sugar and protein  3. Reviewed protein sources and building a balanced plate    Monitoring/Evaluation:    Goals:  1. Practice label reading for fat and sodium  2. Increase intentional movement  3. Continue to monitor portion sizes    Follow up:  Pt will follow up in 1 month(s) with dietitian.     Time spent with patient: 30 minutes  Lotus Martinez RD     ABN signed: Yes

## 2021-05-31 VITALS — BODY MASS INDEX: 33.99 KG/M2 | WEIGHT: 204 LBS | HEIGHT: 65 IN

## 2021-05-31 NOTE — PROGRESS NOTES
Medical  Weight Loss Follow-Up Diet Evaluation  Assessment:  Yuliana is presenting today for a follow up weight management nutrition consultation. Pt has had an initial appointment with Dr. Coronado.  Weight loss medication: Phentermine.   Pt's Initial Weight: 233 lbs  Weight: 216 lb (98 kg)  Weight loss from initial: 17  % Weight loss: 7.3 %    BMI: Body mass index is 36.5 kg/m .  IBW: 120-130 lbs    Estimated RMR ([place]-St Jeor equation): 1562kcal   Recommended Protein Intake: 60-80 grams of protein/day  Patient Active Problem List:  Patient Active Problem List   Diagnosis     Fatigue     GERD (gastroesophageal reflux disease)     Degenerative disc disease     Osteoarthritis     Gout     Asthma     COPD (chronic obstructive pulmonary disease) (H)     Hypertension     Back pain     Metabolic syndrome     Hyperparathyroidism, secondary (H)     Dyslipidemia     Hyperparathyroidism (H)     Low bone mass     Progress on goals from last visit: Patient has her son, daughter in law and grandchildren living with her at this time- she states she is eating a lot of fruits and veggies. She states she is tired from keeping up with them. Was waiting on some parts for her CPAP and states the new machine is working well. Walking around the park with her dog and her grandchildren.   Using coffee saucer for meals to help with portion sizes.   Is concerned because she was told she has CHF, this disease was explained to her as she asked what it was- was encouraged to keep an eye out for sudden weight gain and fluid retention.     Breakfast: oatmeal and 1/2 banana, milk  Lunch: sliced apples and string cheese  Dinner: fish or chicken and veggies  No snacking    Hydration (type/oz. per day):  Water: 34oz- working on adding a third bottle   Caffeine:very little- choosing decaf and zero calorie creamer  Exercise:  Routine exercise established: Yes     Nutrition Diagnosis:    (NI-5.7.1) Inadequate protein intake related to Gastric bypass  causing increased nutrient needs due to malabsorption/ Decreased ability to consume sufficient protein as evidenced by Edema, poor musculature, dull skin, thin and fragile hair; and Estimated intake of protein insufficient to meet requirements    Intervention:  1. Recommend calorie/nutrient modification    Implementation:  1. Reviewed progress with previous goals  2. Motivational interviewing    Monitoring/Evaluation:  Follow up:  Pt will follow up in 2 month(s) with bariatrician and 5 month(s) with dietitian.     Time spent with patient: 30 minutes  Lotus Martinez RD     ABN signed: Yes

## 2021-06-01 VITALS — WEIGHT: 208.5 LBS | BODY MASS INDEX: 34.74 KG/M2 | HEIGHT: 65 IN

## 2021-06-01 VITALS — BODY MASS INDEX: 35.16 KG/M2 | HEIGHT: 65 IN | WEIGHT: 211 LBS

## 2021-06-01 VITALS — HEIGHT: 65 IN | WEIGHT: 202 LBS | BODY MASS INDEX: 33.66 KG/M2

## 2021-06-01 VITALS — HEIGHT: 65 IN | WEIGHT: 209 LBS | BODY MASS INDEX: 34.82 KG/M2

## 2021-06-02 VITALS — WEIGHT: 212 LBS | HEIGHT: 65 IN | BODY MASS INDEX: 35.32 KG/M2

## 2021-06-02 NOTE — PROGRESS NOTES
Medical  Weight Loss Follow-Up Diet Evaluation  Assessment:  Yuliana is presenting today for a follow up weight management nutrition consultation. Pt has had an initial appointment with Dr. Coronado.  Weight loss medication: Phentermine. Goal is 185lb.  Pt's Initial Weight: 233 lbs  Weight: 217 lb 4.8 oz (98.6 kg)  Weight loss from initial: 15.7  % Weight loss: 6.74 %    BMI: Body mass index is 36.72 kg/m .  IBW: 120-130 lbs    Estimated RMR ([place]-St Jeor equation): 1568kcal   Recommended Protein Intake: 60-80 grams of protein/day  Patient Active Problem List:  Patient Active Problem List   Diagnosis     Fatigue     GERD (gastroesophageal reflux disease)     Degenerative disc disease     Osteoarthritis     Gout     Asthma     COPD (chronic obstructive pulmonary disease) (H)     Hypertension     Back pain     Metabolic syndrome     Hyperparathyroidism, secondary (H)     Dyslipidemia     Hyperparathyroidism (H)     Low bone mass     Progress on goals from last visit: walking dog, watching what she is eating, reports high stress as she has her son and his family living with her in her one bedroom apartment, is very tired. She is excited that her clothes are fitting looser. Trying to fit a gym into her budget- encouraged her to check into potential discounts with her insurance. Still staying away from work food (Kandace Ryna).     Dietary Recall:  Breakfast: egg and cheese burrito at work (small size)  Snack: none  Lunch: 1/2 turkey ham sandwich and a glass of milk  Snack: none  Dinner:hamburger helper- 1/2 cup  Snack: none  +is staying away from sugar, candy, cookies and starchy foods  Hydration (type/oz. per day):  Water: 3-4 16oz bottles per day  Caffeine: 1 cup   Exercise:  Routine exercise established: Yes  Walking dog     Nutrition Diagnosis:    (NI-5.7.1) Inadequate protein intake related to Gastric bypass causing increased nutrient needs due to malabsorption/ Decreased ability to consume sufficient protein as  evidenced by Edema, poor musculature, dull skin, thin and fragile hair; and Estimated intake of protein insufficient to meet requirements    Intervention:  1. Food and/or nutrient delivery: Encouraged patient to eat three meals per day, maybe trying a protein shake or something quick when tired in the evening  2. Nutrition education: provided recipes resources  3. Nutrition counseling: motivational interviewing and support for continued progress    Monitoring/Evaluation:    Goals:  1. Eat three meals per day  Follow up:  Pt will follow up in 3 month(s) with bariatrician and 2 month(s) with dietitian.     Time spent with patient: 30 minutes  Lotus Martinez RD     ABN signed: Yes

## 2021-06-03 VITALS — HEIGHT: 65 IN | WEIGHT: 220 LBS | BODY MASS INDEX: 36.65 KG/M2

## 2021-06-03 VITALS — BODY MASS INDEX: 36.74 KG/M2 | HEIGHT: 65 IN | WEIGHT: 220.5 LBS

## 2021-06-03 VITALS — BODY MASS INDEX: 37 KG/M2 | WEIGHT: 222.1 LBS | HEIGHT: 65 IN

## 2021-06-03 VITALS — BODY MASS INDEX: 36.21 KG/M2 | HEIGHT: 65 IN | WEIGHT: 217.3 LBS

## 2021-06-03 VITALS — WEIGHT: 216 LBS | BODY MASS INDEX: 35.99 KG/M2 | HEIGHT: 65 IN

## 2021-06-04 VITALS
BODY MASS INDEX: 35.82 KG/M2 | HEART RATE: 88 BPM | OXYGEN SATURATION: 98 % | RESPIRATION RATE: 18 BRPM | WEIGHT: 215 LBS | DIASTOLIC BLOOD PRESSURE: 82 MMHG | SYSTOLIC BLOOD PRESSURE: 138 MMHG | HEIGHT: 65 IN

## 2021-06-04 VITALS — HEIGHT: 65 IN | WEIGHT: 211.8 LBS | BODY MASS INDEX: 35.29 KG/M2

## 2021-06-04 VITALS — BODY MASS INDEX: 36.32 KG/M2 | WEIGHT: 218 LBS | HEIGHT: 65 IN

## 2021-06-04 VITALS — HEIGHT: 65 IN | BODY MASS INDEX: 35.49 KG/M2 | WEIGHT: 213 LBS

## 2021-06-04 VITALS — WEIGHT: 215 LBS | HEIGHT: 65 IN | BODY MASS INDEX: 35.82 KG/M2

## 2021-06-04 NOTE — PROGRESS NOTES
Medical  Weight Loss Follow-Up Diet Evaluation  Assessment:  Yuliana is presenting today for a follow up weight management nutrition consultation. Pt has had an initial appointment with Dr. Coronado.  Weight loss medication: Phentermine.   Pt's Initial Weight: 233 lbs  Weight: 211 lb 12.8 oz (96.1 kg)  Weight loss from initial: 21.2  % Weight loss: 9.1 %    BMI: Body mass index is 35.79 kg/m .  IBW: 120-130 lbs    Estimated RMR (Orogrande-St Jeor equation): 1543kcal   Recommended Protein Intake: 60-80 grams of protein/day  Patient Active Problem List:  Patient Active Problem List   Diagnosis     Fatigue     GERD (gastroesophageal reflux disease)     Degenerative disc disease     Osteoarthritis     Gout     Asthma     COPD (chronic obstructive pulmonary disease) (H)     Hypertension     Back pain     Metabolic syndrome     Hyperparathyroidism, secondary (H)     Dyslipidemia     Hyperparathyroidism (H)     Low bone mass     Diabetes: No     Progress on goals from last visit: patient states she has been watching what she is eating, limiting sweets, has been following post bariatric surgery guidelines  Has been noticing some discomfort with foods recently- for the past month  Few minutes after eating something- mostly in the morning- soft stools, slight cramping, 3 times per day    Dietary Recall:  Breakfast: Greek yogurt, 1/2 piece of dry biscuit, 1/2 cup decaf coffee and a bottle of water  Snack: 1/2 sandwich on wheat bread (thin cut turkey breast)  Lunch: bean burrito on small tortilla with shredded lettuce, tomatoes, little cheese and unsweetened fruit cocktail  Snack: none  Dinner: bean burrito with a bit of turkey meat with lettuce, tomatoes and cheese  Snack: none  Hydration (type/oz. per day):  Water: 64+oz  Caffeine: decaf coffee  Exercise:  Routine exercise established: Yes  Walking dog daily      Nutrition Diagnosis:    Overweight/Obesity (NC 3.3) related to overeating and poor lifestyle habits as evidenced by  physical inactivity and BMI 35.79    Intervention:  1. Food and/or nutrient delivery: encourage increased intake of fiber as well as vegetables, aiming to get 2-3 servings per day  2. Nutrition counseling: encouraged patient to track food along with GI symptoms to determine a potential relation between food and bowel movements    Monitoring/Evaluation:    Goals:  1. Track food and bowel movements    Follow up:  Pt will follow up in 3 week(s) with bariatrician and 2 month(s) with dietitian.     Time spent with patient: 30 minutes  Lotus Martinez RD     ABN signed: Yes

## 2021-06-05 VITALS — BODY MASS INDEX: 30.82 KG/M2 | WEIGHT: 185 LBS | HEIGHT: 65 IN

## 2021-06-05 VITALS — WEIGHT: 218 LBS | HEIGHT: 65 IN | BODY MASS INDEX: 36.32 KG/M2

## 2021-06-05 NOTE — PROGRESS NOTES
Bariatric Follow Up Visit with a History of Previous Bariatric Surgery     Date of visit: 1/8/2020  Physician: Dorothy Coronado MD  Primary Care Provider:  Mayda Steward MD Tracy L Clark   59 y.o.  female    Date of Surgery: 4/23/2013  Initial Weight: 231#  Initial BMI: 38.9  Today's Weight:   Wt Readings from Last 1 Encounters:   01/08/20 215 lb (97.5 kg)     Body mass index is 36.33 kg/m .      Assessment and Plan     Assessment: Yuliana is a 59 y.o. year old female who is 5.5 yrs s/p  Jose Daniel en Y Gastric Bypass with Dr. Laura Armendariz feels as if she has not yet achieved the goals she hoped to accomplish through bariatric surgery and weight loss.    Encounter Diagnoses   Name Primary?     Postoperative malabsorption Yes     Rash      GERD (gastroesophageal reflux disease)      Osteoarthritis      Metabolic syndrome      Hyperparathyroidism, secondary (H)      Dyslipidemia      Low bone mass      Diabetes type 2, controlled (H)      Malabsorption          Current Outpatient Medications:      acetaminophen (TYLENOL) 500 MG tablet, Take 500-1,000 mg by mouth as needed., Disp: , Rfl:      ADVAIR -21 mcg/actuation inhaler, Inhale 1 puff 2 (two) times a day., Disp: , Rfl: 11     albuterol (PROVENTIL HFA;VENTOLIN HFA) 90 mcg/actuation inhaler, Inhale 2 puffs every 6 (six) hours as needed for wheezing., Disp: , Rfl:      albuterol (PROVENTIL) 2.5 mg /3 mL (0.083 %) nebulizer solution, Take 2.5 mg by nebulization every 6 (six) hours as needed for wheezing., Disp: , Rfl:      amLODIPine (NORVASC) 5 MG tablet, Take 2.5 mg by mouth daily. , Disp: , Rfl:      atorvastatin (LIPITOR) 20 MG tablet, Take 20 mg by mouth bedtime., Disp: , Rfl:      benzonatate (TESSALON) 100 MG capsule, Take 1-2 capsules by mouth as needed., Disp: , Rfl:      CALCIUM CITRATE/VITAMIN D3 (CITRACAL + D PETITES ORAL), Take 2 tablets by mouth 2 (two) times a day. , Disp: , Rfl:      carvedilol (COREG) 3.125 MG tablet, Take 3.125 mg  by mouth bedtime. , Disp: , Rfl:      celecoxib (CELEBREX) 100 MG capsule, Take 1 capsule by mouth 2 (two) times a day., Disp: , Rfl:      cholecalciferol, vitamin D3, 5,000 unit capsule, Take 1 capsule by mouth daily., Disp: , Rfl: 0     clotrimazole (LOTRIMIN) 1 % cream, Apply 1 application topically 2 (two) times a day., Disp: , Rfl: 1     cyanocobalamin, vitamin B-12, 1,000 mcg Subl, Place 1 tablet (1,000 mcg total) under the tongue daily., Disp: 90 tablet, Rfl: prn     diphenhydrAMINE (BENADRYL) 25 mg capsule, Take 25-50 mg by mouth as needed., Disp: , Rfl:      fluocinonide (LIDEX) 0.05 % cream, Apply 1 application topically 2 (two) times a day., Disp: , Rfl:      gabapentin (NEURONTIN) 300 MG capsule, Take 300 mg by mouth 3 (three) times a day., Disp: , Rfl:      hydrocortisone 2.5 % cream, Apply 1 application topically as needed., Disp: , Rfl:      hydrOXYzine pamoate (VISTARIL) 25 MG capsule, Take 1-2 capsules by mouth as needed., Disp: , Rfl:      levETIRAcetam (KEPPRA) 1000 MG tablet, Take 1 tablet by mouth 2 (two) times a day., Disp: , Rfl: 3     losartan (COZAAR) 50 MG tablet, Take 50 mg by mouth daily. Two tabs daily, Disp: , Rfl:      miconazole (MICOTIN) 2 % powder, Apply 1 application topically as needed for itching (for rash under breasts)., Disp: , Rfl:      montelukast (SINGULAIR) 10 mg tablet, Take 10 mg by mouth bedtime., Disp: , Rfl:      omeprazole (PRILOSEC) 20 MG capsule, Take 20 mg by mouth 2 (two) times a day. , Disp: , Rfl:      pediatric multivit-iron-min (CEROVITE JR) Chew, Chew 1 tablet 2 (two) times a day., Disp: 180 each, Rfl: 3     phentermine (ADIPEX-P) 37.5 mg tablet, Take 0.5-1 tablets (18.75-37.5 mg total) by mouth daily before breakfast., Disp: 90 tablet, Rfl: 1     SF 5000 PLUS 1.1 % Crea, Apply 1 application topically as needed., Disp: , Rfl: 0     venlafaxine (EFFEXOR-XR) 150 MG 24 hr capsule, Take 1 capsule by mouth daily., Disp: , Rfl: 11     VIRTUSSIN AC  mg/5 mL  "liquid, Take 5 mL by mouth every 4 (four) hours as needed., Disp: , Rfl: 1    Plan: Discussed benefits of RYGB metabolically. Encouraged her to follow Lotus's recommendations. Discussed GLP-1 Bruna  Discussed panniculectomy. Referral to Dr. Keshawn Fontana for definitive treatment of intertriginous dermatitis. Continue topical creams, antifungals, drying and conservative measures.    Return for Next scheduled follow up.    Bariatric Surgery Review     Interim History/LifeChanges: She wonders about surgery for removing extra skin. She maintains a 16# weight loss. She gets full quickly. She has been walking her dog daily. She is up at 4 and tired.   She believes she has become lactose intolerant. Follow up labs looked good. She has had gastrocolic reflex for the past few months.  She does not feel well after [employer] custard.    Patient Concerns: Lactose intolerant  Appetite (1-10): OK  GERD: no    Medication changes: none    Vitamin Intake:   B-12   SL   MVI  2/d   Vitamin D  5,000   Calcium   citrate     Other  no              LABS: \"Reviewed  From June and August look good.   Nausea no  Vomiting no  Constipation n  Diarrhea yes  Rashes yes under abdominal pannus, chronic recurrent, wet, pruritic, malodorous,   Hair Loss no  Calf tenderness no  Breathing difficulty no  Reactive Hypoglycemia yes  Light Headedness no   Moods OK    12 point ROS as above and otherwise negative      Habits:  Alcohol: no  Tobacco: no  Caffeine yes  NSAIDS no  Exercise Routine: busy at work, ADL  3 meals/day yes  Protein first yes  60+ grams/day  Water Separate from meals yes  Calorie Containing Beverages no  Restaurant eating/wk rare  Sleeping OK-wakes up at 4 am  Stress moderate  CPAP: NA  Contraception: PM    Social History     Social History     Socioeconomic History     Marital status: Single     Spouse name: Not on file     Number of children: Not on file     Years of education: Not on file     Highest education level: Not on file "   Occupational History     Not on file   Social Needs     Financial resource strain: Not on file     Food insecurity:     Worry: Not on file     Inability: Not on file     Transportation needs:     Medical: Not on file     Non-medical: Not on file   Tobacco Use     Smoking status: Former Smoker     Smokeless tobacco: Never Used     Tobacco comment: quit over 25 years ago   Substance and Sexual Activity     Alcohol use: Yes     Comment: Rarely     Drug use: No     Sexual activity: Not Currently     Partners: Male     Birth control/protection: Surgical   Lifestyle     Physical activity:     Days per week: Not on file     Minutes per session: Not on file     Stress: Not on file   Relationships     Social connections:     Talks on phone: Not on file     Gets together: Not on file     Attends Yazdanism service: Not on file     Active member of club or organization: Not on file     Attends meetings of clubs or organizations: Not on file     Relationship status: Not on file     Intimate partner violence:     Fear of current or ex partner: Not on file     Emotionally abused: Not on file     Physically abused: Not on file     Forced sexual activity: Not on file   Other Topics Concern     Not on file   Social History Narrative     Not on file       Past Medical History     Past Medical History:   Diagnosis Date     Asthma      Back pain      Congestive heart failure (H)      COPD (chronic obstructive pulmonary disease) (H)      Degenerative disc disease      Diabetes type 2, controlled (H)      Dyslipidemia      GERD (gastroesophageal reflux disease)      Gout      Hyperparathyroidism (H)      Hyperparathyroidism, secondary (H)      Hypertension      Low bone mass      Metabolic syndrome      Morbid obesity (H)      Osteoarthritis      Reflux      Reflux      Problem List     Patient Active Problem List   Diagnosis     Fatigue     GERD (gastroesophageal reflux disease)     Degenerative disc disease     Osteoarthritis     Gout      Asthma     COPD (chronic obstructive pulmonary disease) (H)     Hypertension     Back pain     Metabolic syndrome     Hyperparathyroidism, secondary (H)     Dyslipidemia     Hyperparathyroidism (H)     Low bone mass     Total knee replacement status, left     Partial idiopathic epilepsy with seizures of localized onset, not intractable, without status epilepticus (H)     Hyperlipemia     Diabetes type 2, controlled (H)     Medications     Current Outpatient Medications   Medication Sig Note     acetaminophen (TYLENOL) 500 MG tablet Take 500-1,000 mg by mouth as needed.      ADVAIR -21 mcg/actuation inhaler Inhale 1 puff 2 (two) times a day.      albuterol (PROVENTIL HFA;VENTOLIN HFA) 90 mcg/actuation inhaler Inhale 2 puffs every 6 (six) hours as needed for wheezing.      albuterol (PROVENTIL) 2.5 mg /3 mL (0.083 %) nebulizer solution Take 2.5 mg by nebulization every 6 (six) hours as needed for wheezing.      amLODIPine (NORVASC) 5 MG tablet Take 2.5 mg by mouth daily.       atorvastatin (LIPITOR) 20 MG tablet Take 20 mg by mouth bedtime.      benzonatate (TESSALON) 100 MG capsule Take 1-2 capsules by mouth as needed. 3/9/2018: Received from: External Pharmacy     CALCIUM CITRATE/VITAMIN D3 (CITRACAL + D PETITES ORAL) Take 2 tablets by mouth 2 (two) times a day.       carvedilol (COREG) 3.125 MG tablet Take 3.125 mg by mouth bedtime.       celecoxib (CELEBREX) 100 MG capsule Take 1 capsule by mouth 2 (two) times a day.      cholecalciferol, vitamin D3, 5,000 unit capsule Take 1 capsule by mouth daily. 5/1/2017: Received from: External Pharmacy Received Sig: TK 1 C PO QD     clotrimazole (LOTRIMIN) 1 % cream Apply 1 application topically 2 (two) times a day.      cyanocobalamin, vitamin B-12, 1,000 mcg Subl Place 1 tablet (1,000 mcg total) under the tongue daily.      diphenhydrAMINE (BENADRYL) 25 mg capsule Take 25-50 mg by mouth as needed.      fluocinonide (LIDEX) 0.05 % cream Apply 1 application  topically 2 (two) times a day. 3/9/2018: Received from: [facility] Received Sig: Apply  topically two times a day.     gabapentin (NEURONTIN) 300 MG capsule Take 300 mg by mouth 3 (three) times a day.      hydrocortisone 2.5 % cream Apply 1 application topically as needed. 3/9/2018: Received from: [facility] Received Sig: Apply to inflamed skin 2-3 times daily until clear but not more than 2-3 weeks at a time     hydrOXYzine pamoate (VISTARIL) 25 MG capsule Take 1-2 capsules by mouth as needed.      levETIRAcetam (KEPPRA) 1000 MG tablet Take 1 tablet by mouth 2 (two) times a day. 5/1/2017: Received from: External Pharmacy Received Sig: TK 1 T PO BID     losartan (COZAAR) 50 MG tablet Take 50 mg by mouth daily. Two tabs daily      miconazole (MICOTIN) 2 % powder Apply 1 application topically as needed for itching (for rash under breasts).      montelukast (SINGULAIR) 10 mg tablet Take 10 mg by mouth bedtime.      omeprazole (PRILOSEC) 20 MG capsule Take 20 mg by mouth 2 (two) times a day.       pediatric multivit-iron-min (CEROVITE JR) Chew Chew 1 tablet 2 (two) times a day.      phentermine (ADIPEX-P) 37.5 mg tablet Take 0.5-1 tablets (18.75-37.5 mg total) by mouth daily before breakfast.      SF 5000 PLUS 1.1 % Crea Apply 1 application topically as needed. 5/1/2017: Received from: External Pharmacy     venlafaxine (EFFEXOR-XR) 150 MG 24 hr capsule Take 1 capsule by mouth daily. 5/1/2017: Received from: External Pharmacy Received Sig: TK ONE C PO QD     VIRTUSSIN AC  mg/5 mL liquid Take 5 mL by mouth every 4 (four) hours as needed. 3/9/2018: Received from: External Pharmacy Received Sig: TK 5 ML PO Q 4 H PRF COUGH     Surgical History     Past Surgical History  She has a past surgical history that includes Jose Daniel-en-y procedure (4/23/2014); Cholecystectomy; Hysterectomy; Tubal ligation; Total knee arthroplasty (Left, 03/2019); and Knee arthroscopy w/ meniscal repair (Left, 01/2019).    Objective-Exam  "    Constitutional:  /82   Pulse 88   Resp 18   Ht 5' 4.5\" (1.638 m)   Wt 215 lb (97.5 kg)   SpO2 98%   BMI 36.33 kg/m    Height: 5' 4.5\" (1.638 m) (1/8/2020  3:39 PM)  Initial Weight: 233 lbs (1/8/2020  3:39 PM)  Weight: 215 lb (97.5 kg) (1/8/2020  3:39 PM)  Weight loss from initial: 18 (1/8/2020  3:39 PM)  % Weight loss: 7.73 % (1/8/2020  3:39 PM)  BMI (Calculated): 36.3 (1/8/2020  3:39 PM)  SpO2: 98 % (1/8/2020  3:39 PM)  NSAIDS: Yes (6/14/2019  3:51 PM)    General:  Pleasant and in no acute distress   Eyes:  EOMI  ENT:  Airway 1+  Moist mucous membranes  Neck:  Supple, No LAD, No thyromegaly, No carotid bruits appreciated  Respiratory: Normal respiratory effort, no cough, wheezes or crackles  CV:  Regular rate and Rhythm,no murmurs, pulses 2+, no calf tenderness, no LE edema  Gastrointestinal: Abdomen NT/ND, BS+  Musculoskeletal: muscle mass WNL  Skin: color normal hair full, incisions nicely healed, significant ptosis abdominal pannus, color changes of chronic recurrent rash, moist and tender  Neurological: No tremor, normal gait  Psychiatric: alert and oriented X3, mood and affect normal    Counseling     We reviewed the important post op bariatric recommendations:  -eating 3 meals daily  -eating protein first, getting >60gm protein daily  -eating slowly, chewing food well  -avoiding/limiting calorie containing beverages  -drinking water 15-30 minutes before or after meals  -choosing wheat, not white with breads, crackers, pastas, micheal, bagels, tortillas, rice  -limiting restaurant or cafeteria eating to twice a week or less    We discussed the importance of restorative sleep and stress management in maintaining a healthy weight.  We discussed the National Weight Control Registry healthy weight maintenance strategies and ways to optimize metabolism.  We discussed the importance of physical activity including cardiovascular and strength training in maintaining a healthier weight.    We discussed the " importance of life-long vitamin supplementation and life-long  follow-up.    Yuliana was reminded that, to avoid marginal ulcers she should avoid tobacco at all, alcohol in excess, caffeine in excess, and NSAIDS (unless indicated for cardioprotection or othewise and opposed by a PPI).    Dorothy Coronado MD, [facility]AM[facility].  1/8/2020  4:10 PM      No images are attached to the encounter.   30 minutes spent with patient. >50% in counseling and coordination of care.

## 2021-06-05 NOTE — PATIENT INSTRUCTIONS - HE
Dr. Keshawn Fontana-Plastic surgeon    [facility]ariat[facility]  Nutritional Guidelines  Gastric Bypass 18 Months Post Op and Beyond    General Guidelines and Helpful Hints:    Eat 3 meals per day + protein supplement(s). No snacks between meals.  o Do not skip meals.  This can cause overeating at the next meal and will prevent adequate protein and nutritional intake.    Aim for 60-80 grams of protein per day.  o Always eat your protein first. This assists with optimal nutrition and helps you stay full longer.  o Depending on your portion size, you may need to drink approved protein supplement between meals to achieve protein goals. Follow recommendations of your Dietitian.     Eat your protein first, and then follow with fiber.   o It is not necessary to count your fiber, but 15-20 grams per day is recommended.    o Add fiber by including fruits, vegetables, whole grains, and beans.     Portions should remain about 1 cup per meal. Use measuring cups to be accurate.    Continue to use saucer/salad plates, infant/toddler silverware to keep portion sizes small and take small bites.    Eat S-[id number]-Y to make each meal last 20-30 minutes. Always stop eating when satisfied.    Continue to use caution with foods containing skins, peels or membranes. Chew well!    Aim for 64 oz. of calorie-free fluids daily.  o Continue to avoid caffeine and carbonation. If you choose to drink alcohol, do so in moderation.   o Remember to avoid drinking during meals, 15-30 minutes before and 30 minutes after.    Exercise is knapp for continued weight loss and weight maintenance. Aim for 30-60 minutes of physical activity most days of the week. Include cardiovascular and strength training.    If having trouble tolerating meat, try using a crock-pot, tinfoil tent, steamer or other moist cooking method to create tender meats. Add broth or low-fat gravy to help meat stay moist.     Avoid high sugar and high fat foods to prevent dumping  syndrome.  o Check nutrition labels for less than 10 grams of sugar and less than 10 grams of fat per serving.    Continue Taking Vitamins/Minerals:  o 0610-9908 mcg of Sublingual B-12 daily  o 1 Multivitamin with Iron twice daily (chewable or swallow tabs)  o 500-600 mg Calcium Citrate twice daily (chewable or swallow tabs)  o 5000 IU Vitamin D3 daily    Sample Grocery List    Protein:    Fat free Greek or light yogurt (less than 10 grams sugar)    Fat free or low-fat cottage cheese    String cheese or reduced fat cheese slices    Tuna, salmon, crab, egg, or chicken salad made with light or fat free mayonnaise    Egg or Egg Substitute    Lean/extra lean turkey, beef, bison, venison (ground, sirloin, round, flank)    Pork loin or tenderloin (grilled, baked, broiled)    Fish such as salmon, tuna, trout, tilapia, etc. (grilled, baked, broiled)    Tender cuts of lean (skinless) turkey or chicken    Lean deli meats: turkey, lean ham, chicken, lean roast beef    Beans such as kidney, garbanzo, black, casey, or low-fat/fat free refried beans    Peanut butter (natural preferred). Limit to 1 Tbsp. per day.    Low-fat meatloaf (made with lean ground beef or turkey)    Sloppy Joes made with low-sugar ketchup and lean ground beef or turkey    Soy or vegetable protein (i.e. vegan crumbles, soy/veggie burger, tofu)    Hummus    Vegetables:    Fresh: cooked or raw (as tolerated)    Frozen vegetables    Canned vegetables (low sodium or no salt added, rinse before cooking/eating)    (Ok to have skins/peels/membranes/seeds - just chew well)    Fruits:    Fresh fruit    Frozen fruit (no sugar added)    Canned fruit (packed in its own juice, NOT syrup)    (Ok to have skins/peels/membranes/seeds - just chew well)    Starch:    Unsweetened whole-grain hot cereal (or high fiber cold cereal, dry)    Toasted whole wheat bread or Sunray Thins    Whole grain crackers    Baked /boiled/mashed potato/sweet potato    Cooked whole grain pasta,  brown rice, or other cooked whole grains    Starchy vegetables: corn, peas, winter squash    Protein Supplement:     Ready to drink protein shake with:  o 15-30 grams protein per serving  o Less than 10 grams total carbohydrate per serving     Protein powder mixed with:  o  Skim or 1% milk  o Low fat or fat free Lactaid milk, plain or no sugar added soymilk  o Water     Fats: (use in moderation)    1 teaspoon of soft tub margarine    1 teaspoon olive oil, canola oil, or peanut oil    1 tablespoon of low-fat álvarez or salad dressing     Sample Menu for 18+ months after Gastric Bypass    You do NOT need to eat/drink the full portion sizes listed below  Always stop when you are satisfied    Breakfast   cup 1% cottage cheese     cup mixed berries   Lunch 2 oz lean roast beef on   [place] Thin with 1 tsp. light álvarez    small tomato, chopped, mixed with 1 tsp. light vinaigrette dressing   Supplement Approved protein supplement (if needed between meals)   Dinner 2 oz grilled salmon    cup salad greens with 1 tsp. light salad dressing and 1 tsp. ground flax seed    cup quinoa or brown rice     Breakfast   cup egg substitute with   cup sautéed chopped vegetables  2 light [place] Krisp crackers   Lunch Tuna Melt:   cup tuna mixed with 1 tsp. light álvarez over   [place] Thin. Top with 2-3 slices cucumber and 1 oz slice of low fat cheese   Supplement 1 cup skim milk (if needed between meals)   Dinner 3 oz  grilled, broiled, or baked seasoned skinless chicken breast    cup asparagus     Breakfast   cup plain oatmeal made with skim or 1% milk with 1 Tbsp. flavored/unflavored protein powder added  1 mozzarella string cheese   Lunch 2 oz deli turkey breast  1/3 cup salad with 1 tsp. light salad dressing, 1/8 of a whole avocado and 1 Tbsp. sunflower seeds   Dinner 3 oz. pork loin made in a crock pot, seasoned with a spice rub    cup cooked carrots   Supplement Approved protein supplement (if needed between meals)     Breakfast 1 cup  breakfast casserole made with egg substitute, turkey sausage,  and steamed, chopped bell peppers   Supplement  1 cup light Greek yogurt (if needed between meals)   Lunch 2 oz. teriyaki turkey    cup mashed sweet potato with 1-2 spritzes of spray butter (like [place])    cup fresh pineapple   Dinner 3 oz low fat meatloaf    cup roasted garlic zucchini     Breakfast   cup leftover breakfast casserole    cup no sugar added applesauce with 1 Tbsp. unflavored protein powder and a sprinkle of cinnamon    Lunch 3 oz shrimp with 1-2 Tbsp. low-sugar cocktail sauce for dipping    c. whole wheat pasta drizzled with   tsp. olive oil   Supplement 1 cup skim/1% milk with scoop of protein powder (if needed between meals)   Dinner Grilled, seasoned kebob with 2 oz lean beef and   cup vegetables     Breakfast Breakfast pizza:   [place] Thin spread with 1 Tbsp. low sugar spaghetti sauce,   cup shredded low fat cheese, melted and 1 slice of [place] alvarez     cup fresh fruit mixed with chopped almonds   Lunch   cup black bean soup  4-5 whole grain crackers   Dinner 3 oz  tilapia with lemon pepper seasoning    cup stewed tomatoes   Supplement 1 string cheese (if needed between meals)     Breakfast 2 hard boiled eggs (discard 1 egg yolk)    whole wheat English Muffin with 1 tsp. low sugar jelly   Lunch   cup leftover black bean soup topped with 1-2 Tbsp. low fat cheese  2-3 light [place] Krisp crackers   Supplement Approved protein supplement (if needed between meals)   Dinner 3 oz sirloin steak    cup steamed broccoli

## 2021-06-06 NOTE — TELEPHONE ENCOUNTER
Left a message for the patient letting her know of the plan for a phone visit and to get the DEXA scan scheduled if possible.  Cecelia Villagomez RN

## 2021-06-06 NOTE — TELEPHONE ENCOUNTER
Patient wondering if she needs to come in for her visit with Dr. Coronado on the 25th since her visit with the RD was changed to a phone visit.  She did some labs recently through her primary which can be found in Care Everywhere but needs many additional labs.  She saw Dr. Coronado in Jan and this is somewhat of a medication check in visit too since she started her on Phentermine, although she has a refill that she can get still.  Just wanting some advice on what all is necessary.  Cecelia Villagomez RN

## 2021-06-07 NOTE — PROGRESS NOTES
"Yuliana Armendariz is a 59 y.o. female who is being evaluated via a billable telephone visit.      The patient has been notified of following:     \"This telephone visit will be conducted via a call between you and your physician/provider. We have found that certain health care needs can be provided without the need for a physical exam.  This service lets us provide the care you need with a short phone conversation.  If a prescription is necessary we can send it directly to your pharmacy.  If lab work is needed we can place an order for that and you can then stop by our lab to have the test done at a later time.    If during the course of the call the physician/provider feels a telephone visit is not appropriate, you will not be charged for this service.\"     Yuliana Armendariz complains of    Chief Complaint   Patient presents with     Nutrition Counseling       I have reviewed and updated the patient's Past Medical History, Social History, Family History and Medication List.    ALLERGIES  Aspirin; Latex; and Vicodin [hydrocodone-acetaminophen]    Additional provider notes:     Medical  Weight Loss Follow-Up Diet Evaluation  Assessment:  Yuliana is presenting today for a follow up weight management nutrition consultation. Pt has had an initial appointment with Dr. Coronado  Weight loss medication: Phentermine.  Pt's Initial Weight: 233 lbs  +no new weight to record- most recent weight is 215lb  BMI: Body mass index is 36.33 kg/m .  IBW: 120-130 lbs    Estimated RMR (McCone-St Jeor equation): 1543   Recommended Protein Intake: 60-80 grams of protein/day  Patient Active Problem List:  Patient Active Problem List   Diagnosis     Fatigue     GERD (gastroesophageal reflux disease)     Degenerative disc disease     Osteoarthritis     Gout     Asthma     COPD (chronic obstructive pulmonary disease) (H)     Hypertension     Back pain     Metabolic syndrome     Hyperparathyroidism, secondary (H)     Dyslipidemia     Hyperparathyroidism (H) "     Low bone mass     Total knee replacement status, left     Partial idiopathic epilepsy with seizures of localized onset, not intractable, without status epilepticus (H)     Hyperlipemia     Diabetes type 2, controlled (H)     Diabetes: Yes     Progress on goals from last visit: patient states she is not bingeing, taking her meds, watching portions    Dietary Recall:  Breakfast: 1 egg and a slice of wheat toast, 1/2 cup coffee  Lunch: sliced ham, cheese, milk (patient states she is potentially lactose intolerant), minestrone soup  Dinner: bean burrito, refried beans, string cheese  Snack: fruit  Hydration (type/oz. per day):  Water: 3 bottles  Caffeine:1/2 cup coffee  Exercise:  Routine exercise established: Yes  Walking dog when able     Nutrition Diagnosis:    Overweight/Obesity (NC 3.3) related to overeating and poor lifestyle habits as evidenced by physical inactivity and BMI 36.33    Intervention:  1. Nutrition education: educated patient on controlling blood glucose using diet- encouraged 30-45g carbohydrate per meal  2. Nutrition counseling: goal setting and motivational interviewing for continued success    Monitoring/Evaluation:    Goals:  1. Increase veggies to 1-2 servings per day  2. 30-45g carbohydrate per meal    Assessment/Plan:  1. Bariatric surgery status    2. Obesity, Class II, BMI 35-39.9, isolated (see actual BMI)    3. Nutritional counseling      Patient to follow up in 2-3 months(s) with bariatrician and 2 month(s) with RD    Phone call duration: 30 minutes    Lotus Martinez RD

## 2021-06-07 NOTE — PROGRESS NOTES
"Yuliana Armendariz is a 59 y.o. female who is being evaluated via a billable telephone visit.      The patient has been notified of following:     \"This telephone visit will be conducted via a call between you and your physician/provider. We have found that certain health care needs can be provided without the need for a physical exam.  This service lets us provide the care you need with a short phone conversation.  If a prescription is necessary we can send it directly to your pharmacy.  If lab work is needed we can place an order for that and you can then stop by our lab to have the test done at a later time.    If during the course of the call the physician/provider feels a telephone visit is not appropriate, you will not be charged for this service.\"     Yuliana Armendariz complains of  postoperative malabsorption.    I have reviewed and updated the patient's Past Medical History, Social History, Family History and Medication List.    ALLERGIES  Aspirin; Latex; and Vicodin [hydrocodone-acetaminophen]      Assessment/Plan:    1. Postoperative malabsorption     2. Rash        Continue anti-fungal creams. Continue vitamins with consistency. Annual Labs. DEXA when COVID social distancing requirements have passed.  6 yrs s/p RYGB with Dr. Sanchez Initial weight 231# BMI 38.9    I have reviewed the note as documented above.  This accurately captures the substance of my conversation with the patient.    Phone call contact time    Call Started at: 2:55  Call Ended at: 3:30      Signature:  Dorothy Coronado MD, [facility]      Bariatric Follow Up Visit with a History of Previous Bariatric Surgery     Date of visit: 3/25/2020  Physician: Dorothy Coronado MD  Primary Care Provider:  Mayda Steward MD  Yuliana Armendariz   59 y.o.  female    Date of Surgery: 4/23/2014  Initial Weight: 231#  Initial BMI: 38.9  Today's Weight:   Wt Readings from Last 1 Encounters:   01/08/20 215 lb (97.5 kg)     There is no height or weight on file to " calculate BMI.      Assessment and Plan     Assessment: Yuliana is a 59 y.o. year old female who is 6 yrs s/p  Jose Daniel en Y Gastric Bypass with Dr. Laura Armendariz feels as if she is close to achieving the goals she hoped to accomplish through bariatric surgery and weight loss.  She had lost to 185# and would like to get back down to that    Encounter Diagnoses   Name Primary?     Postoperative malabsorption Yes     Rash          Current Outpatient Medications:      acetaminophen (TYLENOL) 500 MG tablet, Take 500-1,000 mg by mouth as needed., Disp: , Rfl:      ADVAIR -21 mcg/actuation inhaler, Inhale 1 puff 2 (two) times a day., Disp: , Rfl: 11     albuterol (PROVENTIL HFA;VENTOLIN HFA) 90 mcg/actuation inhaler, Inhale 2 puffs every 6 (six) hours as needed for wheezing., Disp: , Rfl:      albuterol (PROVENTIL) 2.5 mg /3 mL (0.083 %) nebulizer solution, Take 2.5 mg by nebulization every 6 (six) hours as needed for wheezing., Disp: , Rfl:      amLODIPine (NORVASC) 5 MG tablet, Take 2.5 mg by mouth daily. , Disp: , Rfl:      atorvastatin (LIPITOR) 20 MG tablet, Take 20 mg by mouth bedtime., Disp: , Rfl:      CALCIUM CITRATE/VITAMIN D3 (CITRACAL + D PETITES ORAL), Take 2 tablets by mouth 2 (two) times a day. , Disp: , Rfl:      carvedilol (COREG) 3.125 MG tablet, Take 3.125 mg by mouth bedtime. , Disp: , Rfl:      celecoxib (CELEBREX) 100 MG capsule, Take 1 capsule by mouth 2 (two) times a day., Disp: , Rfl:      cholecalciferol, vitamin D3, 5,000 unit capsule, Take 1 capsule by mouth daily., Disp: , Rfl: 0     clotrimazole (LOTRIMIN) 1 % cream, Apply 1 application topically 2 (two) times a day., Disp: , Rfl: 1     cyanocobalamin, vitamin B-12, 1,000 mcg Subl, Place 1 tablet (1,000 mcg total) under the tongue daily., Disp: 90 tablet, Rfl: prn     fluocinonide (LIDEX) 0.05 % cream, Apply 1 application topically 2 (two) times a day., Disp: , Rfl:      gabapentin (NEURONTIN) 300 MG capsule, Take 300 mg by mouth 3  "(three) times a day., Disp: , Rfl:      hydrocortisone 2.5 % cream, Apply 1 application topically as needed., Disp: , Rfl:      levETIRAcetam (KEPPRA) 1000 MG tablet, Take 1 tablet by mouth 2 (two) times a day., Disp: , Rfl: 3     losartan (COZAAR) 50 MG tablet, Take 50 mg by mouth daily. Two tabs daily, Disp: , Rfl:      miconazole (MICOTIN) 2 % powder, Apply 1 application topically as needed for itching (for rash under breasts)., Disp: , Rfl:      montelukast (SINGULAIR) 10 mg tablet, Take 10 mg by mouth bedtime., Disp: , Rfl:      omeprazole (PRILOSEC) 20 MG capsule, Take 20 mg by mouth 2 (two) times a day. , Disp: , Rfl:      pediatric multivit-iron-min (CEROVITE JR) Chew, Chew 1 tablet 2 (two) times a day., Disp: 180 each, Rfl: 3     phentermine (ADIPEX-P) 37.5 mg tablet, Take 0.5-1 tablets (18.75-37.5 mg total) by mouth daily before breakfast., Disp: 90 tablet, Rfl: 1     SF 5000 PLUS 1.1 % Crea, Apply 1 application topically as needed., Disp: , Rfl: 0     venlafaxine (EFFEXOR-XR) 150 MG 24 hr capsule, Take 1 capsule by mouth daily., Disp: , Rfl: 11    Plan: Annual Labs. Dietitian f/u today. DEXA when COVID-19 social distancing requirements have passed    Return in about 6 months (around 9/25/2020).    Bariatric Surgery Review     Interim History/LifeChanges: She hurt her knee during a fall at work. She also hurt her shoulder. She maintains a 16# weight loss    Patient Concerns: do I need labs?  Appetite (1-10): OK  GERD: on PPI    Medication changes: none    Vitamin Intake:   B-12   SL   MVI  2/d   Vitamin D  5,000   Calcium   dietary     Other                LABS: \"Reviewed  Labs incomplete but advised to avoid lab d/t COVID  Nausea no  Vomiting no  Constipation no  Diarrhea no  Rashes yes, chronic recurrent, using antifungals  Hair Loss no  Calf tenderness no  Breathing difficulty no  Reactive Hypoglycemia avoids high surgar/high fat  Light Headedness no   Moods OK    12 point ROS as above and otherwise " negative      Habits:  Alcohol: no  Tobacco: no  Caffeine 0-1/d  NSAIDS celebrex and new NSAID  Exercise Routine: walking the dog daily, arm exercises,  3 meals/day yes  Protein first yes  60+grams/day   Water Separate from meals yes  Calorie Containing Beverages no  Restaurant eating/wk none  Sleeping 5 hours  Stress high  CPAP: Not using all of the time.  Contraception: PM  DEXA:due and ordered LBM in 2017    Social History     Social History     Socioeconomic History     Marital status: Single     Spouse name: Not on file     Number of children: Not on file     Years of education: Not on file     Highest education level: Not on file   Occupational History     Not on file   Social Needs     Financial resource strain: Not on file     Food insecurity     Worry: Not on file     Inability: Not on file     Transportation needs     Medical: Not on file     Non-medical: Not on file   Tobacco Use     Smoking status: Former Smoker     Smokeless tobacco: Never Used     Tobacco comment: quit over 25 years ago   Substance and Sexual Activity     Alcohol use: Yes     Comment: Rarely     Drug use: No     Sexual activity: Not Currently     Partners: Male     Birth control/protection: Surgical   Lifestyle     Physical activity     Days per week: Not on file     Minutes per session: Not on file     Stress: Not on file   Relationships     Social connections     Talks on phone: Not on file     Gets together: Not on file     Attends Scientologist service: Not on file     Active member of club or organization: Not on file     Attends meetings of clubs or organizations: Not on file     Relationship status: Not on file     Intimate partner violence     Fear of current or ex partner: Not on file     Emotionally abused: Not on file     Physically abused: Not on file     Forced sexual activity: Not on file   Other Topics Concern     Not on file   Social History Narrative     Not on file       Past Medical History     Past Medical History:    Diagnosis Date     Asthma      Back pain      Congestive heart failure (H)      COPD (chronic obstructive pulmonary disease) (H)      Degenerative disc disease      Diabetes type 2, controlled (H)      Dyslipidemia      GERD (gastroesophageal reflux disease)      Gout      Hyperparathyroidism (H)      Hyperparathyroidism, secondary (H)      Hypertension      Low bone mass      Metabolic syndrome      Morbid obesity (H)      Osteoarthritis      Reflux      Reflux      Problem List     Patient Active Problem List   Diagnosis     Fatigue     GERD (gastroesophageal reflux disease)     Degenerative disc disease     Osteoarthritis     Gout     Asthma     COPD (chronic obstructive pulmonary disease) (H)     Hypertension     Back pain     Metabolic syndrome     Hyperparathyroidism, secondary (H)     Dyslipidemia     Hyperparathyroidism (H)     Low bone mass     Total knee replacement status, left     Partial idiopathic epilepsy with seizures of localized onset, not intractable, without status epilepticus (H)     Hyperlipemia     Diabetes type 2, controlled (H)     Medications     Current Outpatient Medications   Medication Sig Note     acetaminophen (TYLENOL) 500 MG tablet Take 500-1,000 mg by mouth as needed.      ADVAIR -21 mcg/actuation inhaler Inhale 1 puff 2 (two) times a day.      albuterol (PROVENTIL HFA;VENTOLIN HFA) 90 mcg/actuation inhaler Inhale 2 puffs every 6 (six) hours as needed for wheezing.      albuterol (PROVENTIL) 2.5 mg /3 mL (0.083 %) nebulizer solution Take 2.5 mg by nebulization every 6 (six) hours as needed for wheezing.      amLODIPine (NORVASC) 5 MG tablet Take 2.5 mg by mouth daily.       atorvastatin (LIPITOR) 20 MG tablet Take 20 mg by mouth bedtime.      CALCIUM CITRATE/VITAMIN D3 (CITRACAL + D PETITES ORAL) Take 2 tablets by mouth 2 (two) times a day.       carvedilol (COREG) 3.125 MG tablet Take 3.125 mg by mouth bedtime.       celecoxib (CELEBREX) 100 MG capsule Take 1 capsule by  mouth 2 (two) times a day.      cholecalciferol, vitamin D3, 5,000 unit capsule Take 1 capsule by mouth daily. 5/1/2017: Received from: External Pharmacy Received Sig: TK 1 C PO QD     clotrimazole (LOTRIMIN) 1 % cream Apply 1 application topically 2 (two) times a day.      cyanocobalamin, vitamin B-12, 1,000 mcg Subl Place 1 tablet (1,000 mcg total) under the tongue daily.      fluocinonide (LIDEX) 0.05 % cream Apply 1 application topically 2 (two) times a day. 3/9/2018: Received from: [facility] Received Sig: Apply  topically two times a day.     gabapentin (NEURONTIN) 300 MG capsule Take 300 mg by mouth 3 (three) times a day.      hydrocortisone 2.5 % cream Apply 1 application topically as needed. 3/9/2018: Received from: [facility] Received Sig: Apply to inflamed skin 2-3 times daily until clear but not more than 2-3 weeks at a time     levETIRAcetam (KEPPRA) 1000 MG tablet Take 1 tablet by mouth 2 (two) times a day. 5/1/2017: Received from: External Pharmacy Received Sig: TK 1 T PO BID     losartan (COZAAR) 50 MG tablet Take 50 mg by mouth daily. Two tabs daily      miconazole (MICOTIN) 2 % powder Apply 1 application topically as needed for itching (for rash under breasts).      montelukast (SINGULAIR) 10 mg tablet Take 10 mg by mouth bedtime.      omeprazole (PRILOSEC) 20 MG capsule Take 20 mg by mouth 2 (two) times a day.       pediatric multivit-iron-min (CEROVITE JR) Chew Chew 1 tablet 2 (two) times a day.      phentermine (ADIPEX-P) 37.5 mg tablet Take 0.5-1 tablets (18.75-37.5 mg total) by mouth daily before breakfast.      SF 5000 PLUS 1.1 % Crea Apply 1 application topically as needed. 5/1/2017: Received from: External Pharmacy     venlafaxine (EFFEXOR-XR) 150 MG 24 hr capsule Take 1 capsule by mouth daily. 5/1/2017: Received from: External Pharmacy Received Sig: TK ONE C PO QD     Surgical History     Past Surgical History  She has a past surgical history that includes Jose Daniel-en-y procedure  "(4/23/2014); Cholecystectomy; Hysterectomy; Tubal ligation; Total knee arthroplasty (Left, 03/2019); and Knee arthroscopy w/ meniscal repair (Left, 01/2019).    Objective-Exam     Constitutional:  There were no vitals taken for this visit.  Height: 5' 4.5\" (1.638 m) (1/8/2020  3:39 PM)  Initial Weight: 233 lbs (1/8/2020  3:39 PM)  Weight: 215 lb (97.5 kg) (1/8/2020  3:39 PM)  Weight loss from initial: 18 (1/8/2020  3:39 PM)  % Weight loss: 7.73 % (1/8/2020  3:39 PM)  BMI (Calculated): 36.3 (1/8/2020  3:39 PM)  SpO2: 98 % (1/8/2020  3:39 PM)  NSAIDS: Yes (6/14/2019  3:51 PM)    General:  Pleasant and in no acute distress     Psychiatric: alert and oriented X3, mood and affect normal    Counseling     We reviewed the important post op bariatric recommendations:  -eating 3 meals daily  -eating protein first, getting >60gm protein daily  -eating slowly, chewing food well  -avoiding/limiting calorie containing beverages  -drinking water 15-30 minutes before or after meals  -choosing wheat, not white with breads, crackers, pastas, micheal, bagels, tortillas, rice  -limiting restaurant or cafeteria eating to twice a week or less    We discussed the importance of restorative sleep and stress management in maintaining a healthy weight.  We discussed the National Weight Control Registry healthy weight maintenance strategies and ways to optimize metabolism.  We discussed the importance of physical activity including cardiovascular and strength training in maintaining a healthier weight.    We discussed the importance of life-long vitamin supplementation and life-long  follow-up.    Yuliana was reminded that, to avoid marginal ulcers she should avoid tobacco at all, alcohol in excess, caffeine in excess, and NSAIDS (unless indicated for cardioprotection or othewise and opposed by a PPI).    Dorothy Coronado MD, [facility].  3/25/2020  3:01 PM        "

## 2021-06-08 NOTE — TELEPHONE ENCOUNTER
lr has questions about hr  Bone density scan that  She received the results of; has been trying to reach the number at the end of the latter toask questions but has not had response   Caller has questions that  triage nurse was able to answer based on previous knowledge   Caller is reassured and understands to follow up with her PCP.  Nery Schwarz RN  [facility]

## 2021-06-08 NOTE — PROGRESS NOTES
"Yuliana Armendariz is a 59 y.o. female who is being evaluated via a billable telephone visit.      The patient has been notified of following:     \"This telephone visit will be conducted via a call between you and your physician/provider. We have found that certain health care needs can be provided without the need for a physical exam.  This service lets us provide the care you need with a short phone conversation.  If a prescription is necessary we can send it directly to your pharmacy.  If lab work is needed we can place an order for that and you can then stop by our lab to have the test done at a later time.    If during the course of the call the physician/provider feels a telephone visit is not appropriate, you will not be charged for this service.\"     Yuliana Armendariz complains of    Chief Complaint   Patient presents with     Nutrition Counseling       I have reviewed and updated the patient's Past Medical History, Social History, Family History and Medication List.    ALLERGIES  Aspirin; Latex; and Vicodin [hydrocodone-acetaminophen]    Additional provider notes:     Medical  Weight Loss Follow-Up Diet Evaluation  Assessment:  Yuliana is presenting today for a follow up weight management nutrition consultation. Pt has had an initial appointment with Dr. Coronado  Weight loss medication: Phentermine.   Pt's Initial Weight: 233 lbs  Weight: 213 lb (96.6 kg)  Weight loss from initial: 20  % Weight loss: 8.58 %    BMI: Body mass index is 36 kg/m .  IBW: 120-130 lbs    Estimated RMR ([place]-St Jeor equation): 1543 kcal   Recommended Protein Intake: 60-80 grams of protein/day  Patient Active Problem List:  Patient Active Problem List   Diagnosis     Fatigue     GERD (gastroesophageal reflux disease)     Degenerative disc disease     Osteoarthritis     Gout     Asthma     COPD (chronic obstructive pulmonary disease) (H)     Hypertension     Back pain     Metabolic syndrome     Hyperparathyroidism, secondary (H)     " "Dyslipidemia     Hyperparathyroidism (H)     Low bone mass     Total knee replacement status, left     Partial idiopathic epilepsy with seizures of localized onset, not intractable, without status epilepticus (H)     Hyperlipemia     Diabetes type 2, controlled (H)     Diabetes: Yes     Progress on goals from last visit: patient is pre-occupied with surgery coming up.   She continues to use small plates and watching portions. Has been getting out and moving a lot, tired a bit more frequently but likely due to working more recently. She feels as though she would like to just focus on maintaining habits as she\"has a lot on my plate.\"    Nutrition Diagnosis:    Overweight/Obesity (NC 3.3) related to overeating and poor lifestyle habits as evidenced by physical inactivity and BMI 36  Intervention:    1. Nutrition counseling: motivational interviewing for continued success    Monitoring/Evaluation:    Goals:  1. No new goal- focus on maintenance    Assessment/Plan:    Patient to follow up in 4 months(s) with bariatrician and 2.5 month(s) with RD    Phone call duration: 25 minutes    Lotus Martinez RD      "

## 2021-06-08 NOTE — TELEPHONE ENCOUNTER
Patient asked for a refill on her phentermine.  She was just seen in March and has a follow-up scheduled with the RD at the end of the month.  We also got her on the schedule to follow-up with Dr. Coronado again to continue with the phentermine in 6 months.  She prefers to have it sent to [facility].  Cecelia Villagomez RN

## 2021-06-10 ENCOUNTER — OFFICE VISIT - HEALTHEAST (OUTPATIENT)
Dept: SURGERY | Facility: CLINIC | Age: 61
End: 2021-06-10

## 2021-06-10 DIAGNOSIS — Z98.84 BARIATRIC SURGERY STATUS: ICD-10-CM

## 2021-06-10 DIAGNOSIS — Z71.3 NUTRITIONAL COUNSELING: ICD-10-CM

## 2021-06-10 DIAGNOSIS — E66.811 OBESITY, CLASS I, BMI 30.0-34.9 (SEE ACTUAL BMI): ICD-10-CM

## 2021-06-10 ASSESSMENT — MIFFLIN-ST. JEOR: SCORE: 1407.09

## 2021-06-10 NOTE — PROGRESS NOTES
Bariatric Follow Up Visit with a History of Previous Bariatric Surgery     Date of visit: 2017  Physician: Dorothy Coronado MD  Primary Care Provider:  MD Yuliana Campos   56 y.o.  female    Date of Surgery: 2014  Initial Weight: 231#  Initial BMI: 38.9  Today's Weight:   Wt Readings from Last 1 Encounters:   17 197 lb (89.4 kg)     Body mass index is 33.29 kg/(m^2).      Assessment and Plan     Assessment: Yuliana is a 56 y.o. year old female who is 3 yrs s/p  Jose Daniel en Y Gastric Bypass with Dr. Laura Armendariz feels as if she has achieved the goals she hoped to accomplish through bariatric surgery and weight loss. She has been gaining weight and would like to stop.  She was on phentermine last year and saw Florence 6 months ago. Her weight is stable. She had neuro activity thought to be seizure activity and is on Keppra which stopped the spells altogether.  Her mother  in March. She has multiple aunts with various cancers.      Encounter Diagnoses   Name Primary?     Postoperative malabsorption Yes     Obesity (BMI 30.0-34.9)        Plan:Refill phentermine and f/u in 6 months. Sooner if dietitian visit would be useful.      Return in about 6 months (around 2017).    Bariatric Surgery Review     Interim History/LifeChanges: Many life changes Weigh is stable. Mother  in March. Multiple aunts with cancer. On keppra now. Vitamins consistent. Would like refill phentermine. Knee replacement one year ago    Patient Concerns: would like refill. Labwork    Medication changes: keppra.    Vitamin Intake:   B-12   SL   MVI  2/d   Vitamin D  5,000   Calcium   citrate     Other                LABS: ordered  Reviewed preious labs which were excellent  Nausea no  Vomiting no  Constipation no  Diarrhea no  Rashes no  Hair Loss no  Calf tenderness no  Breathing difficulty no  Reactive Hypoglycemia avoids it  Light Headedness no   Moods grieving    12 point ROS as above and otherwise  negative      Habits:  Alcohol: occ  Tobacco: none for 30 yrs  Caffeine minimal  NSAIDS no  Exercise Routine: walking her dog daily  3 meals/day yes  Protein first yes  60 grams/day  Water Separate from meals yes  Calorie Containing Beverages no  Restaurant eating/wk 0-1  Sleeping well 8  Stress high  CPAP: NA  Contraception: PM    Social History     Social History     Social History     Marital status: Single     Spouse name: N/A     Number of children: N/A     Years of education: N/A     Occupational History     Not on file.     Social History Main Topics     Smoking status: Former Smoker     Smokeless tobacco: Not on file      Comment: quit over 25 years ago     Alcohol use Yes      Comment: Rarely     Drug use: No     Sexual activity: Not Currently     Partners: Male     Birth control/ protection: Surgical     Other Topics Concern     Not on file     Social History Narrative       Past Medical History     Past Medical History:   Diagnosis Date     Asthma      Back pain      Congestive heart failure      COPD (chronic obstructive pulmonary disease)      Degenerative disc disease      Diabetes type 2, controlled      Dyslipidemia      GERD (gastroesophageal reflux disease)      Gout      Hyperparathyroidism      Hyperparathyroidism, secondary      Hypertension      Metabolic syndrome      Morbid obesity      Osteoarthritis      Reflux      Reflux      Problem List     Patient Active Problem List   Diagnosis     Fatigue     GERD (gastroesophageal reflux disease)     Degenerative disc disease     Osteoarthritis     Gout     Asthma     COPD (chronic obstructive pulmonary disease)     Hypertension     Back pain     Metabolic syndrome     Hyperparathyroidism, secondary     Dyslipidemia     Hyperparathyroidism     Medications     Current Outpatient Prescriptions   Medication Sig Note     albuterol (PROVENTIL HFA;VENTOLIN HFA) 90 mcg/actuation inhaler Inhale 2 puffs every 6 (six) hours as needed for wheezing.       albuterol (PROVENTIL) 2.5 mg /3 mL (0.083 %) nebulizer solution Take 2.5 mg by nebulization every 6 (six) hours as needed for wheezing.      amLODIPine (NORVASC) 5 MG tablet Take 2.5 mg by mouth daily.       atorvastatin (LIPITOR) 20 MG tablet Take 20 mg by mouth bedtime.      CALCIUM CITRATE/VITAMIN D3 (CITRACAL + D PETITES ORAL) Take 2 tablets by mouth 2 (two) times a day.       carvedilol (COREG) 3.125 MG tablet Take 3.125 mg by mouth bedtime.       cholecalciferol, vitamin D3, 5,000 unit capsule Take 1 capsule by mouth daily. 5/1/2017: Received from: External Pharmacy Received Sig: TK 1 C PO QD     cyanocobalamin, vitamin B-12, 1,000 mcg Subl Place 1 tablet (1,000 mcg total) under the tongue daily.      diclofenac sodium (VOLTAREN) 1 % Gel Apply 1 application topically as needed. 5/1/2017: Received from: External Pharmacy Received Sig: APPLY 4 GRAMS TO RIGHT KNEE QID UTD     fluticasone-salmeterol (ADVAIR) 100-50 mcg/dose DISKUS Inhale 1 puff 2 (two) times a day.      gabapentin (NEURONTIN) 300 MG capsule Take 1 capsule by mouth daily. Ramping up 8/27/2015: Received from: External Pharmacy     levETIRAcetam (KEPPRA) 1000 MG tablet Take 1 tablet by mouth 2 (two) times a day. 5/1/2017: Received from: External Pharmacy Received Sig: TK 1 T PO BID     losartan (COZAAR) 50 MG tablet Take 50 mg by mouth daily. Two tabs daily      miconazole (MICOTIN) 2 % powder Apply 1 application topically as needed for itching (for rash under breasts).      montelukast (SINGULAIR) 10 mg tablet Take 10 mg by mouth bedtime.      omeprazole (PRILOSEC) 20 MG capsule Take 20 mg by mouth 2 (two) times a day.       pediatric multivit-iron-min (CEROVITE JR) Chew Chew 1 tablet 2 (two) times a day.      SF 5000 PLUS 1.1 % Crea Apply 1 application topically as needed. 5/1/2017: Received from: External Pharmacy     venlafaxine (EFFEXOR-XR) 150 MG 24 hr capsule Take 1 capsule by mouth daily. 5/1/2017: Received from: External Pharmacy Received  "Sig: TK ONE C PO QD     phentermine (ADIPEX-P) 37.5 mg tablet Take 1/2 to 1 tablet in the morning.      Surgical History     Past Surgical History  She has a past surgical history that includes Jose Daniel-en-y procedure (4/23/2014); Cholecystectomy; Hysterectomy; and Tubal ligation.    Objective-Exam     Constitutional:  /76  Pulse 71  Resp 18  Ht 5' 4.5\" (1.638 m)  Wt 197 lb (89.4 kg)  SpO2 99%  BMI 33.29 kg/m2  Height: 5' 4.5\" (1.638 m) (5/1/2017 12:16 PM)  Initial Weight: 233 lbs (5/1/2017 12:16 PM)  Weight: 197 lb (89.4 kg) (5/1/2017 12:16 PM)  Weight loss from initial: 36 (5/1/2017 12:16 PM)  % Weight loss: 15.45 % (5/1/2017 12:16 PM)  BMI (Calculated): 33.3 (5/1/2017 12:16 PM)  SpO2: 99 % (5/1/2017 12:16 PM)  BP (mmHg): 136/85 (9/6/2016  2:00 PM)  General:  Pleasant and in no acute distress   Eyes:  EOMI  ENT:  Airway 2+  Moist mucous membranes  Neck:  Supple, No LAD, No thyromegaly, No carotid bruits appreciated  Respiratory: Normal respiratory effort, no cough, wheezes or crackles  CV:  Regular rate and Rhythm,no murmurs, pulses 2+, no calf tenderness, no LE edema  Gastrointestinal: Abdomen NT/ND, BS+  Musculoskeletal: muscle mass WNL  Skin: color good hair full, incisions nicely healed  Neurological: No tremor, normal gait  Psychiatric: alert and oriented X3, mood and affect normal    Counseling     We reviewed the important post op bariatric recommendations:  -eating 3 meals daily  -eating protein first, getting >60gm protein daily  -eating slowly, chewing food well  -avoiding/limiting calorie containing beverages  -drinking water 15-30 minutes before or after meals  -choosing wheat, not white with breads, crackers, pastas, micheal, bagels, tortillas, rice  -limiting restaurant or cafeteria eating to twice a week or less    We discussed the importance of restorative sleep and stress management in maintaining a healthy weight.  We discussed the National Weight Control Registry healthy weight maintenance " strategies and ways to optimize metabolism.  We discussed the importance of physical activity including cardiovascular and strength training in maintaining a healthier weight.    We discussed the importance of life-long vitamin supplementation and life-long  follow-up.    Yuliana was reminded that, to avoid marginal ulcers she should avoid tobacco at all, alcohol in excess, caffeine in excess, and NSAIDS (unless indicated for cardioprotection or othewise and opposed by a PPI).    Dorothy Coronado MD, [facility].  5/1/2017  1:03 PM     30 minutes spent with patient. >50% in counseling and coordination of care.

## 2021-06-10 NOTE — PROGRESS NOTES
"Yuliana Armendariz is a 60 y.o. female who is being evaluated via a billable telephone visit.      The patient has been notified of following:     \"This telephone visit will be conducted via a call between you and your physician/provider. We have found that certain health care needs can be provided without the need for a physical exam.  This service lets us provide the care you need with a short phone conversation.  If a prescription is necessary we can send it directly to your pharmacy.  If lab work is needed we can place an order for that and you can then stop by our lab to have the test done at a later time.    If during the course of the call the physician/provider feels a telephone visit is not appropriate, you will not be charged for this service.\"     Yuliana Armendariz complains of    Chief Complaint   Patient presents with     Nutrition Counseling       I have reviewed and updated the patient's Past Medical History, Social History, Family History and Medication List.    ALLERGIES  Aspirin; Latex; and Vicodin [hydrocodone-acetaminophen]    Additional provider notes:     Medical  Weight Loss Follow-Up Diet Evaluation  Assessment:  Yuliana is presenting today for a follow up weight management nutrition consultation. Pt has had an initial appointment with Dr. Coronado  Weight loss medication: Phentermine.   Pt's Initial Weight: 233 lbs  Weight: 218 lb (98.9 kg)  Weight loss from initial: 15  % Weight loss: 6.44 %    BMI: Body mass index is 36.84 kg/m .  IBW: 120-130 lbs    Estimated RMR (Mazon-St Jeor equation): 1540kcal   Recommended Protein Intake: 60-80 grams of protein/day  Patient Active Problem List:  Patient Active Problem List   Diagnosis     Fatigue     GERD (gastroesophageal reflux disease)     Degenerative disc disease     Osteoarthritis     Gout     Asthma     COPD (chronic obstructive pulmonary disease) (H)     Hypertension     Back pain     Metabolic syndrome     Hyperparathyroidism, secondary (H)     " Dyslipidemia     Hyperparathyroidism (H)     Low bone mass     Total knee replacement status, left     Partial idiopathic epilepsy with seizures of localized onset, not intractable, without status epilepticus (H)     Hyperlipemia     Diabetes type 2, controlled (H)     Diabetes: Yes     Progress on goals from last visit: patient had a short visit today as she did not answer right away. She had a tough colonoscopy yesterday and is not feeling the best today. She reported a series of mishaps in the past couple weeks. She did state she is doing well with diet, trying to follow the guidelines as close as she can.     Nutrition Diagnosis:     Overweight/Obesity (NC 3.3) related to overeating and poor lifestyle habits as evidenced by physical inactivity and BMI 36  Intervention:     1. Nutrition counseling: motivational interviewing for continued success       Monitoring/Evaluation:    Goals:  No new goals at this time- very little time was spent discussing diet changes    Assessment/Plan:    Patient to follow up in 1 months(s) with bariatrician and 2-3 month(s) with RD    Phone call duration: 15 minutes    Lotus Martinez RD

## 2021-06-11 NOTE — PATIENT INSTRUCTIONS - HE
[facility]  Nutritional Guidelines  Gastric Bypass 18 Months Post Op and Beyond    General Guidelines and Helpful Hints:    Eat 3 meals per day + protein supplement(s). No snacks between meals.  o Do not skip meals.  This can cause overeating at the next meal and will prevent adequate protein and nutritional intake.    Aim for 60-80 grams of protein per day.  o Always eat your protein first. This assists with optimal nutrition and helps you stay full longer.  o Depending on your portion size, you may need to drink approved protein supplement between meals to achieve protein goals. Follow recommendations of your Dietitian.     Eat your protein first, and then follow with fiber.   o It is not necessary to count your fiber, but 15-20 grams per day is recommended.    o Add fiber by including fruits, vegetables, whole grains, and beans.     Portions should remain about 1 cup per meal. Use measuring cups to be accurate.    Continue to use saucer/salad plates, infant/toddler silverware to keep portion sizes small and take small bites.    Eat S-L-O-W-L-Y to make each meal last 20-30 minutes. Always stop eating when satisfied.    Continue to use caution with foods containing skins, peels or membranes. Chew well!    Aim for 64 oz. of calorie-free fluids daily.  o Continue to avoid caffeine and carbonation. If you choose to drink alcohol, do so in moderation.   o Remember to avoid drinking during meals, 15-30 minutes before and 30 minutes after.    Exercise is knapp for continued weight loss and weight maintenance. Aim for 30-60 minutes of physical activity most days of the week. Include cardiovascular and strength training.    If having trouble tolerating meat, try using a crock-pot, tinfoil tent, steamer or other moist cooking method to create tender meats. Add broth or low-fat gravy to help meat stay moist.     Avoid high sugar and high fat foods to prevent dumping syndrome.  o Check nutrition labels for less  than 10 grams of sugar and less than 10 grams of fat per serving.    Continue Taking Vitamins/Minerals:  o 8536-4192 mcg of Sublingual B-12 daily  o 1 Multivitamin with Iron twice daily (chewable or swallow tabs)  o 500-600 mg Calcium Citrate twice daily (chewable or swallow tabs)  o 5000 IU Vitamin D3 daily    Sample Grocery List    Protein:    Fat free Greek or light yogurt (less than 10 grams sugar)    Fat free or low-fat cottage cheese    String cheese or reduced fat cheese slices    Tuna, salmon, crab, egg, or chicken salad made with light or fat free mayonnaise    Egg or Egg Substitute    Lean/extra lean turkey, beef, bison, venison (ground, sirloin, round, flank)    Pork loin or tenderloin (grilled, baked, broiled)    Fish such as salmon, tuna, trout, tilapia, etc. (grilled, baked, broiled)    Tender cuts of lean (skinless) turkey or chicken    Lean deli meats: turkey, lean ham, chicken, lean roast beef    Beans such as kidney, garbanzo, black, casey, or low-fat/fat free refried beans    Peanut butter (natural preferred). Limit to 1 Tbsp. per day.    Low-fat meatloaf (made with lean ground beef or turkey)    Sloppy Joes made with low-sugar ketchup and lean ground beef or turkey    Soy or vegetable protein (i.e. vegan crumbles, soy/veggie burger, tofu)    Hummus    Vegetables:    Fresh: cooked or raw (as tolerated)    Frozen vegetables    Canned vegetables (low sodium or no salt added, rinse before cooking/eating)    (Ok to have skins/peels/membranes/seeds - just chew well)    Fruits:    Fresh fruit    Frozen fruit (no sugar added)    Canned fruit (packed in its own juice, NOT syrup)    (Ok to have skins/peels/membranes/seeds - just chew well)    Starch:    Unsweetened whole-grain hot cereal (or high fiber cold cereal, dry)    Toasted whole wheat bread or [place] Thins    Whole grain crackers    Baked /boiled/mashed potato/sweet potato    Cooked whole grain pasta, brown rice, or other cooked whole  grains    Starchy vegetables: corn, peas, winter squash    Protein Supplement:     Ready to drink protein shake with:  o 15-30 grams protein per serving  o Less than 10 grams total carbohydrate per serving     Protein powder mixed with:  o  Skim or 1% milk  o Low fat or fat free Lactaid milk, plain or no sugar added soymilk  o Water     Fats: (use in moderation)    1 teaspoon of soft tub margarine    1 teaspoon olive oil, canola oil, or peanut oil    1 tablespoon of low-fat álvarez or salad dressing     Sample Menu for 18+ months after Gastric Bypass    You do NOT need to eat/drink the full portion sizes listed below  Always stop when you are satisfied    Breakfast   cup 1% cottage cheese     cup mixed berries   Lunch 2 oz lean roast beef on   Weed Thin with 1 tsp. light álvarez    small tomato, chopped, mixed with 1 tsp. light vinaigrette dressing   Supplement Approved protein supplement (if needed between meals)   Dinner 2 oz grilled salmon    cup salad greens with 1 tsp. light salad dressing and 1 tsp. ground flax seed    cup quinoa or brown rice     Breakfast   cup egg substitute with   cup sautéed chopped vegetables  2 light [place] Krisp crackers   Lunch Tuna Melt:   cup tuna mixed with 1 tsp. light álvarez over   Weed Thin. Top with 2-3 slices cucumber and 1 oz slice of low fat cheese   Supplement 1 cup skim milk (if needed between meals)   Dinner 3 oz  grilled, broiled, or baked seasoned skinless chicken breast    cup asparagus     Breakfast   cup plain oatmeal made with skim or 1% milk with 1 Tbsp. flavored/unflavored protein powder added  1 mozzarella string cheese   Lunch 2 oz deli turkey breast  1/3 cup salad with 1 tsp. light salad dressing, 1/8 of a whole avocado and 1 Tbsp. sunflower seeds   Dinner 3 oz. pork loin made in a crock pot, seasoned with a spice rub    cup cooked carrots   Supplement Approved protein supplement (if needed between meals)     Breakfast 1 cup breakfast casserole made with egg  substitute, turkey sausage,  and steamed, chopped bell peppers   Supplement  1 cup light Greek yogurt (if needed between meals)   Lunch 2 oz. teriyaki turkey    cup mashed sweet potato with 1-2 spritzes of spray butter (like [place])    cup fresh pineapple   Dinner 3 oz low fat meatloaf    cup roasted garlic zucchini     Breakfast   cup leftover breakfast casserole    cup no sugar added applesauce with 1 Tbsp. unflavored protein powder and a sprinkle of cinnamon    Lunch 3 oz shrimp with 1-2 Tbsp. low-sugar cocktail sauce for dipping    c. whole wheat pasta drizzled with   tsp. olive oil   Supplement 1 cup skim/1% milk with scoop of protein powder (if needed between meals)   Dinner Grilled, seasoned kebob with 2 oz lean beef and   cup vegetables     Breakfast Breakfast pizza:   [place] Thin spread with 1 Tbsp. low sugar spaghetti sauce,   cup shredded low fat cheese, melted and 1 slice of [place] alvarez     cup fresh fruit mixed with chopped almonds   Lunch   cup black bean soup  4-5 whole grain crackers   Dinner 3 oz  tilapia with lemon pepper seasoning    cup stewed tomatoes   Supplement 1 string cheese (if needed between meals)     Breakfast 2 hard boiled eggs (discard 1 egg yolk)    whole wheat English Muffin with 1 tsp. low sugar jelly   Lunch   cup leftover black bean soup topped with 1-2 Tbsp. low fat cheese  2-3 light [place] Krisp crackers   Supplement Approved protein supplement (if needed between meals)   Dinner 3 oz sirloin steak    cup steamed broccoli

## 2021-06-11 NOTE — PROGRESS NOTES
"Yuliana Armendariz is a 60 y.o. female who is being evaluated via a billable telephone visit.      The patient has been notified of following:     \"This telephone visit will be conducted via a call between you and your physician/provider. We have found that certain health care needs can be provided without the need for a physical exam.  This service lets us provide the care you need with a short phone conversation.  If a prescription is necessary we can send it directly to your pharmacy.  If lab work is needed we can place an order for that and you can then stop by our lab to have the test done at a later time.    Telephone visits are billed at different rates depending on your insurance coverage. During this emergency period, for some insurers they may be billed the same as an in-person visit.  Please reach out to your insurance provider with any questions.    If during the course of the call the physician/provider feels a telephone visit is not appropriate, you will not be charged for this service.\"    Patient has given verbal consent to a Telephone visit? Yes    What phone number would you like to be contacted at? [phone]    Patient would like to receive their AVS by AVS Preference: Mariel.    Additional provider notes   Yuliana Armendariz is a 60 y.o. female who is being evaluated via a billable telephone visit.      The patient has been notified of following:     \"This telephone visit will be conducted via a call between you and your physician/provider. We have found that certain health care needs can be provided without the need for a physical exam.  This service lets us provide the care you need with a short phone conversation.  If a prescription is necessary we can send it directly to your pharmacy.  If lab work is needed we can place an order for that and you can then stop by our lab to have the test done at a later time.    If during the course of the call the physician/provider feels a telephone visit is not " "appropriate, you will not be charged for this service.\"     Yuliana Armendariz complains of  postoperative malabsorption.    I have reviewed and updated the patient's Past Medical History, Social History, Family History and Medication List.    ALLERGIES  Aspirin; Latex; and Vicodin [hydrocodone-acetaminophen]      Assessment/Plan:    Post op malabsorption      I have reviewed the note as documented above.  This accurately captures the substance of my conversation with the patient.    Phone call contact time    Call Started at: 3:20pm  Call Ended at: 3:45      Signature:  Dorothy Coronado MD, [facility]      Bariatric Follow Up Visit with a History of Previous Bariatric Surgery     Date of visit: 9/15/2020  Physician: Dorothy Coronado MD  Primary Care Provider:  Mayda Steward MD Traronen Armendariz   60 y.o.  female    Date of Surgery: 4/23/2014  Initial Weight: 231#  Initial BMI: 38.9  Today's Weight:   Wt Readings from Last 1 Encounters:   09/15/20 218 lb (98.9 kg)     Body mass index is 36.84 kg/m .      Assessment and Plan     Assessment: Yuliana is a 60 y.o. year old female who is 6 yrs 5 months s/p  Jose Daniel en Y Gastric Bypass with Dr. Laura Armendariz feels as if she has achieved the goals she hoped to accomplish through bariatric surgery and weight loss.    Encounter Diagnosis   Name Primary?     Postoperative malabsorption Yes         Current Outpatient Medications:      acetaminophen (TYLENOL) 500 MG tablet, Take 500-1,000 mg by mouth as needed., Disp: , Rfl:      ADVAIR -21 mcg/actuation inhaler, Inhale 1 puff 2 (two) times a day., Disp: , Rfl: 11     albuterol (PROVENTIL HFA;VENTOLIN HFA) 90 mcg/actuation inhaler, Inhale 2 puffs every 6 (six) hours as needed for wheezing., Disp: , Rfl:      albuterol (PROVENTIL) 2.5 mg /3 mL (0.083 %) nebulizer solution, Take 2.5 mg by nebulization every 6 (six) hours as needed for wheezing., Disp: , Rfl:      amLODIPine (NORVASC) 5 MG tablet, Take 2.5 mg by mouth " daily. , Disp: , Rfl:      atorvastatin (LIPITOR) 20 MG tablet, Take 20 mg by mouth bedtime., Disp: , Rfl:      CALCIUM CITRATE/VITAMIN D3 (CITRACAL + D PETITES ORAL), Take 2 tablets by mouth 2 (two) times a day. , Disp: , Rfl:      carvedilol (COREG) 3.125 MG tablet, Take 3.125 mg by mouth bedtime. , Disp: , Rfl:      celecoxib (CELEBREX) 100 MG capsule, Take 1 capsule by mouth 2 (two) times a day., Disp: , Rfl:      cholecalciferol, vitamin D3, 5,000 unit capsule, Take 1 capsule by mouth daily., Disp: , Rfl: 0     clotrimazole (LOTRIMIN) 1 % cream, Apply 1 application topically 2 (two) times a day., Disp: , Rfl: 1     cyanocobalamin, vitamin B-12, 1,000 mcg Subl, Place 1 tablet (1,000 mcg total) under the tongue daily., Disp: 90 tablet, Rfl: prn     fluocinonide (LIDEX) 0.05 % cream, Apply 1 application topically 2 (two) times a day., Disp: , Rfl:      hydrocortisone 2.5 % cream, Apply 1 application topically as needed., Disp: , Rfl:      levETIRAcetam (KEPPRA) 1000 MG tablet, Take 1 tablet by mouth 2 (two) times a day., Disp: , Rfl: 3     losartan (COZAAR) 50 MG tablet, Take 50 mg by mouth daily. Two tabs daily, Disp: , Rfl:      miconazole (MICOTIN) 2 % powder, Apply 1 application topically as needed for itching (for rash under breasts)., Disp: , Rfl:      montelukast (SINGULAIR) 10 mg tablet, Take 10 mg by mouth bedtime., Disp: , Rfl:      omeprazole (PRILOSEC) 20 MG capsule, Take 20 mg by mouth 2 (two) times a day. , Disp: , Rfl:      pediatric multivit-iron-min (CEROVITE JR) Chew, Chew 1 tablet 2 (two) times a day., Disp: 180 each, Rfl: 3     phentermine (ADIPEX-P) 37.5 mg tablet, TAKE HALF TO ONE TABLET BY MOUTH DAILY BEFORE BREAKFAST, Disp: 90 tablet, Rfl: 1     SF 5000 PLUS 1.1 % Crea, Apply 1 application topically as needed., Disp: , Rfl: 0     venlafaxine (EFFEXOR-XR) 150 MG 24 hr capsule, Take 1 capsule by mouth daily., Disp: , Rfl: 11    Plan: Annual labs. Continue phentermine and working with Sebastian  "Annual labs ordered. Continue vitamins with consistency.     Return in about 1 month (around 10/15/2020) for Next scheduled follow up.    Bariatric Surgery Review     Interim History/LifeChanges: Yuliana has been stressed. She is taking her vitamins with consistency. She had a colonoscopy in August which was not a good experience. She is taking a fiber supplement and has had some cramping. She cut her finger and had surgery and hand PT    Patient Concerns: weight  Appetite (1-10): down d/t stress  GERD: on PPI    Medication changes: none    Vitamin Intake:   B-12   SL   MVI  flintstone's chewable   Vitamin D  3 or 5,000U   Calcium        Other                LABS: \"Reviewed  From June 2019  Nausea no  Vomiting no  Constipation managed  Diarrhea no  Rashes no  Hair Loss no  Calf tenderness no  Breathing difficulty no  Reactive Hypoglycemia no  Light Headedness no   Moods OK all things considered.     12 point ROS as above and otherwise negative      Habits:  Alcohol: no  Tobacco: no  Caffeine coffee decaf  NSAIDS celebrex and gabapentin on occasion for knee and shoulder  Exercise Routine: walking consistency  3 meals/day trying  Protein first yes Boiled eggs, yogurt, breakfast bar, premier protein ? grams/day  Water Separate from meals yes  Calorie Containing Beverages no  Restaurant eating/wk no  Sleeping OK with CPAP  Stress High  CPAP: consistent  Contraception: PM  DEXA:due 2023. LBM stable 2020    Social History     Social History     Socioeconomic History     Marital status: Single     Spouse name: Not on file     Number of children: Not on file     Years of education: Not on file     Highest education level: Not on file   Occupational History     Not on file   Social Needs     Financial resource strain: Not on file     Food insecurity     Worry: Not on file     Inability: Not on file     Transportation needs     Medical: Not on file     Non-medical: Not on file   Tobacco Use     Smoking status: Former Smoker     " Smokeless tobacco: Never Used     Tobacco comment: quit over 25 years ago   Substance and Sexual Activity     Alcohol use: Yes     Comment: Rarely     Drug use: No     Sexual activity: Not Currently     Partners: Male     Birth control/protection: Surgical   Lifestyle     Physical activity     Days per week: Not on file     Minutes per session: Not on file     Stress: Not on file   Relationships     Social connections     Talks on phone: Not on file     Gets together: Not on file     Attends [employer] service: Not on file     Active member of club or organization: Not on file     Attends meetings of clubs or organizations: Not on file     Relationship status: Not on file     Intimate partner violence     Fear of current or ex partner: Not on file     Emotionally abused: Not on file     Physically abused: Not on file     Forced sexual activity: Not on file   Other Topics Concern     Not on file   Social History Narrative     Not on file       Past Medical History     Past Medical History:   Diagnosis Date     Asthma      Back pain      Congestive heart failure (H)      COPD (chronic obstructive pulmonary disease) (H)      Degenerative disc disease      Diabetes type 2, controlled (H)      Dyslipidemia      GERD (gastroesophageal reflux disease)      Gout      Hyperparathyroidism (H)      Hyperparathyroidism, secondary (H)      Hypertension      Low bone mass      Metabolic syndrome      Morbid obesity (H)      Osteoarthritis      Reflux      Reflux      Problem List     Patient Active Problem List   Diagnosis     Fatigue     GERD (gastroesophageal reflux disease)     Degenerative disc disease     Osteoarthritis     Gout     Asthma     COPD (chronic obstructive pulmonary disease) (H)     Hypertension     Back pain     Metabolic syndrome     Hyperparathyroidism, secondary (H)     Dyslipidemia     Hyperparathyroidism (H)     Low bone mass     Total knee replacement status, left     Partial idiopathic epilepsy with  seizures of localized onset, not intractable, 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      ADVAIR -21 mcg/actuation inhaler Inhale 1 puff 2 (two) times a day.      albuterol (PROVENTIL HFA;VENTOLIN HFA) 90 mcg/actuation inhaler Inhale 2 puffs every 6 (six) hours as needed for wheezing.      albuterol (PROVENTIL) 2.5 mg /3 mL (0.083 %) nebulizer solution Take 2.5 mg by nebulization every 6 (six) hours as needed for wheezing.      amLODIPine (NORVASC) 5 MG tablet Take 2.5 mg by mouth daily.       atorvastatin (LIPITOR) 20 MG tablet Take 20 mg by mouth bedtime.      CALCIUM CITRATE/VITAMIN D3 (CITRACAL + D PETITES ORAL) Take 2 tablets by mouth 2 (two) times a day.       carvedilol (COREG) 3.125 MG tablet Take 3.125 mg by mouth bedtime.       celecoxib (CELEBREX) 100 MG capsule Take 1 capsule by mouth 2 (two) times a day.      cholecalciferol, vitamin D3, 5,000 unit capsule Take 1 capsule by mouth daily. 5/1/2017: Received from: External Pharmacy Received Sig: TK 1 C PO QD     clotrimazole (LOTRIMIN) 1 % cream Apply 1 application topically 2 (two) times a day.      cyanocobalamin, vitamin B-12, 1,000 mcg Subl Place 1 tablet (1,000 mcg total) under the tongue daily.      fluocinonide (LIDEX) 0.05 % cream Apply 1 application topically 2 (two) times a day. 3/9/2018: Received from: [facility] Received Sig: Apply  topically two times a day.     hydrocortisone 2.5 % cream Apply 1 application topically as needed. 3/9/2018: Received from: [facility] Received Sig: Apply to inflamed skin 2-3 times daily until clear but not more than 2-3 weeks at a time     levETIRAcetam (KEPPRA) 1000 MG tablet Take 1 tablet by mouth 2 (two) times a day. 5/1/2017: Received  "from: External Pharmacy Received Sig: TK 1 T PO BID     losartan (COZAAR) 50 MG tablet Take 50 mg by mouth daily. Two tabs daily      miconazole (MICOTIN) 2 % powder Apply 1 application topically as needed for itching (for rash under breasts).      montelukast (SINGULAIR) 10 mg tablet Take 10 mg by mouth bedtime.      omeprazole (PRILOSEC) 20 MG capsule Take 20 mg by mouth 2 (two) times a day.       pediatric multivit-iron-min (CEROVITE JR) Chew Chew 1 tablet 2 (two) times a day.      phentermine (ADIPEX-P) 37.5 mg tablet TAKE HALF TO ONE TABLET BY MOUTH DAILY BEFORE BREAKFAST      SF 5000 PLUS 1.1 % Crea Apply 1 application topically as needed. 5/1/2017: Received from: External Pharmacy     venlafaxine (EFFEXOR-XR) 150 MG 24 hr capsule Take 1 capsule by mouth daily. 5/1/2017: Received from: External Pharmacy Received Sig: TK ONE C PO QD     Surgical History     Past Surgical History  She has a past surgical history that includes Jose Daniel-en-y procedure (4/23/2014); Cholecystectomy; Hysterectomy; Tubal ligation; Total knee arthroplasty (Left, 03/2019); and Knee arthroscopy w/ meniscal repair (Left, 01/2019).    Objective-Exam     Constitutional:  Ht 5' 4.5\" (1.638 m)   Wt 218 lb (98.9 kg)   BMI 36.84 kg/m    Height: 5' 4.5\" (1.638 m) (9/15/2020  2:00 PM)  Initial Weight: 233 lbs (9/15/2020  2:00 PM)  Weight: 218 lb (98.9 kg) (9/15/2020  2:00 PM)  Weight loss from initial: 15 (9/15/2020  2:00 PM)  % Weight loss: 6.44 % (9/15/2020  2:00 PM)  BMI (Calculated): 36.9 (9/15/2020  2:00 PM)  SpO2: 98 % (1/8/2020  3:39 PM)    General:  Pleasant and in no acute distress     Psychiatric: alert and oriented X3, mood and affect normal    Counseling     We reviewed the important post op bariatric recommendations:  -eating 3 meals daily  -eating protein first, getting >60gm protein daily  -eating slowly, chewing food well  -avoiding/limiting calorie containing beverages  -drinking water 15-30 minutes before or after meals  -choosing " wheat, not white with breads, crackers, pastas, micheal, bagels, tortillas, rice  -limiting restaurant or cafeteria eating to twice a week or less    We discussed the importance of restorative sleep and stress management in maintaining a healthy weight.  We discussed the National Weight Control Registry healthy weight maintenance strategies and ways to optimize metabolism.  We discussed the importance of physical activity including cardiovascular and strength training in maintaining a healthier weight.    We discussed the importance of life-long vitamin supplementation and life-long  follow-up.    Yuliana was reminded that, to avoid marginal ulcers she should avoid tobacco at all, alcohol in excess, caffeine in excess, and NSAIDS (unless indicated for cardioprotection or othewise and opposed by a PPI).    Dorothy Coronado MD, [facility].  9/15/2020  3:26 PM          Phone call duration: 25 minutes    Dorothy Coronado MD

## 2021-06-12 NOTE — PROGRESS NOTES
Lab orders placed for patient in preparation for her phentermine f/u appointment with  in September.  Pt had her full set of bariatric post op labs drawn in May 2017 for her 3 yr visit but needs to have her vitamin D and PTH levels rechecked in September.  She will have the labs drawn at [facility].        Sandy Delacruz RN, [facility]  P [phone]  F [phone]

## 2021-06-12 NOTE — PROGRESS NOTES
Bariatric Follow Up Visit with a History of Previous Bariatric Surgery     Date of visit: 9/8/2017  Physician: Dorothy Coronado MD  Primary Care Provider:  MD Yuliana Campos   57 y.o.  female    Date of Surgery: 4/23/2014  Initial Weight: 231#  Initial BMI: 38.9  Today's Weight:   Wt Readings from Last 1 Encounters:   09/08/17 204 lb (92.5 kg)     Body mass index is 34.48 kg/(m^2).      Assessment and Plan     Assessment: Yuliana is a 57 y.o. year old female who is 3 yrs 5 months s/p  Jose Daniel en Y Gastric Bypass with Dr. Laura Armendariz feels as if she has not achieved the goals she hoped to accomplish through bariatric surgery and weight loss.  She feels more active but is exhausted. Overall she is OK.     No diagnosis found.      Current Outpatient Prescriptions:      albuterol (PROVENTIL HFA;VENTOLIN HFA) 90 mcg/actuation inhaler, Inhale 2 puffs every 6 (six) hours as needed for wheezing., Disp: , Rfl:      albuterol (PROVENTIL) 2.5 mg /3 mL (0.083 %) nebulizer solution, Take 2.5 mg by nebulization every 6 (six) hours as needed for wheezing., Disp: , Rfl:      amLODIPine (NORVASC) 5 MG tablet, Take 2.5 mg by mouth daily. , Disp: , Rfl:      atorvastatin (LIPITOR) 20 MG tablet, Take 20 mg by mouth bedtime., Disp: , Rfl:      CALCIUM CITRATE/VITAMIN D3 (CITRACAL + D PETITES ORAL), Take 2 tablets by mouth 2 (two) times a day. , Disp: , Rfl:      carvedilol (COREG) 3.125 MG tablet, Take 3.125 mg by mouth bedtime. , Disp: , Rfl:      cholecalciferol, vitamin D3, 5,000 unit capsule, Take 1 capsule by mouth daily., Disp: , Rfl: 0     cyanocobalamin, vitamin B-12, 1,000 mcg Subl, Place 1 tablet (1,000 mcg total) under the tongue daily., Disp: 90 tablet, Rfl: prn     diclofenac sodium (VOLTAREN) 1 % Gel, Apply 1 application topically as needed., Disp: , Rfl: 0     fluticasone-salmeterol (ADVAIR) 100-50 mcg/dose DISKUS, Inhale 1 puff 2 (two) times a day., Disp: , Rfl:      gabapentin (NEURONTIN) 300  "MG capsule, Take 1 capsule by mouth daily. Ramping up, Disp: , Rfl:      levETIRAcetam (KEPPRA) 1000 MG tablet, Take 1 tablet by mouth 2 (two) times a day., Disp: , Rfl: 3     losartan (COZAAR) 50 MG tablet, Take 50 mg by mouth daily. Two tabs daily, Disp: , Rfl:      miconazole (MICOTIN) 2 % powder, Apply 1 application topically as needed for itching (for rash under breasts)., Disp: , Rfl:      montelukast (SINGULAIR) 10 mg tablet, Take 10 mg by mouth bedtime., Disp: , Rfl:      omeprazole (PRILOSEC) 20 MG capsule, Take 20 mg by mouth 2 (two) times a day. , Disp: , Rfl:      pediatric multivit-iron-min (CEROVITE JR) Chew, Chew 1 tablet 2 (two) times a day., Disp: 180 each, Rfl: 3     phentermine (ADIPEX-P) 37.5 mg tablet, Take 1/2 to 1 tablet in the morning., Disp: 90 tablet, Rfl: 1     SF 5000 PLUS 1.1 % Crea, Apply 1 application topically as needed., Disp: , Rfl: 0     venlafaxine (EFFEXOR-XR) 150 MG 24 hr capsule, Take 1 capsule by mouth daily., Disp: , Rfl: 11    Plan: Dietitian visit would be helpful. Vitamin D boost and recheck PTH in 6 mo. Increase daily D to 10,000U after RX.  Continue walking with consistency. Dexa due . Protect your sleep    Return in about 1 year (around 2018). Labs in 6 mo.    Bariatric Surgery Review     Interim History/LifeChanges: Her mother  in the March at 74 yo of CHF, DM. She has gained weight. She has family members in [place] and in [place]    Patient Concerns: weight regain    Medication changes: none    Vitamin Intake:   B-12   SL   MVI  2/d   Vitamin D  5,000U daily   Calcium   citrate     Other                LABS: \"Reviewed  PTH remains elevated. Vitamin D  Nausea no  Vomiting no  Constipation no  Diarrhea no  Rashes yes, bothersome-3 different ointments  Hair Loss no  Calf tenderness no  Breathing difficulty no  Reactive Hypoglycemia no  Light Headedness no   Moods stressed    12 point ROS as above and otherwise negative      Habits:  Alcohol: 0-3  Tobacco: " no  Caffeine decaf 1.5/d  NSAIDS none  Exercise Routine: walks daily with her dog at the park  3 meals/day B: skips or 1/2 bagle-cinnamon and raisin, egg and cheese burrito at [employer], L: 1/2 sandwich on thins D: baked chicken, peas, carrots, baked potato  Protein first sometimes  60 grams/day by history  Water Separate from meals yes  Calorie Containing Beverages no  Restaurant eating/wk 0-2  Sleeping poor-4-6 hours  Stress high  CPAP: NA  Contraception: surgical    Social History     Social History     Social History     Marital status: Single     Spouse name: N/A     Number of children: N/A     Years of education: N/A     Occupational History     Not on file.     Social History Main Topics     Smoking status: Former Smoker     Smokeless tobacco: Never Used      Comment: quit over 25 years ago     Alcohol use Yes      Comment: Rarely     Drug use: No     Sexual activity: Not Currently     Partners: Male     Birth control/ protection: Surgical     Other Topics Concern     Not on file     Social History Narrative       Past Medical History     Past Medical History:   Diagnosis Date     Asthma      Back pain      Congestive heart failure      COPD (chronic obstructive pulmonary disease)      Degenerative disc disease      Diabetes type 2, controlled      Dyslipidemia      GERD (gastroesophageal reflux disease)      Gout      Hyperparathyroidism      Hyperparathyroidism, secondary      Hypertension      Low bone mass      Metabolic syndrome      Morbid obesity      Osteoarthritis      Reflux      Reflux      Problem List     Patient Active Problem List   Diagnosis     Fatigue     GERD (gastroesophageal reflux disease)     Degenerative disc disease     Osteoarthritis     Gout     Asthma     COPD (chronic obstructive pulmonary disease)     Hypertension     Back pain     Metabolic syndrome     Hyperparathyroidism, secondary     Dyslipidemia     Hyperparathyroidism     Low bone mass     Medications     Current Outpatient  Prescriptions   Medication Sig Note     albuterol (PROVENTIL HFA;VENTOLIN HFA) 90 mcg/actuation inhaler Inhale 2 puffs every 6 (six) hours as needed for wheezing.      albuterol (PROVENTIL) 2.5 mg /3 mL (0.083 %) nebulizer solution Take 2.5 mg by nebulization every 6 (six) hours as needed for wheezing.      amLODIPine (NORVASC) 5 MG tablet Take 2.5 mg by mouth daily.       atorvastatin (LIPITOR) 20 MG tablet Take 20 mg by mouth bedtime.      CALCIUM CITRATE/VITAMIN D3 (CITRACAL + D PETITES ORAL) Take 2 tablets by mouth 2 (two) times a day.       carvedilol (COREG) 3.125 MG tablet Take 3.125 mg by mouth bedtime.       cholecalciferol, vitamin D3, 5,000 unit capsule Take 1 capsule by mouth daily. 5/1/2017: Received from: External Pharmacy Received Sig: TK 1 C PO QD     cyanocobalamin, vitamin B-12, 1,000 mcg Subl Place 1 tablet (1,000 mcg total) under the tongue daily.      diclofenac sodium (VOLTAREN) 1 % Gel Apply 1 application topically as needed. 5/1/2017: Received from: External Pharmacy Received Sig: APPLY 4 GRAMS TO RIGHT KNEE QID UTD     fluticasone-salmeterol (ADVAIR) 100-50 mcg/dose DISKUS Inhale 1 puff 2 (two) times a day.      gabapentin (NEURONTIN) 300 MG capsule Take 1 capsule by mouth daily. Ramping up 8/27/2015: Received from: External Pharmacy     levETIRAcetam (KEPPRA) 1000 MG tablet Take 1 tablet by mouth 2 (two) times a day. 5/1/2017: Received from: External Pharmacy Received Sig: TK 1 T PO BID     losartan (COZAAR) 50 MG tablet Take 50 mg by mouth daily. Two tabs daily      miconazole (MICOTIN) 2 % powder Apply 1 application topically as needed for itching (for rash under breasts).      montelukast (SINGULAIR) 10 mg tablet Take 10 mg by mouth bedtime.      omeprazole (PRILOSEC) 20 MG capsule Take 20 mg by mouth 2 (two) times a day.       pediatric multivit-iron-min (CEROVITE JR) Chew Chew 1 tablet 2 (two) times a day.      phentermine (ADIPEX-P) 37.5 mg tablet Take 1/2 to 1 tablet in the morning.   "    SF 5000 PLUS 1.1 % Crea Apply 1 application topically as needed. 5/1/2017: Received from: External Pharmacy     venlafaxine (EFFEXOR-XR) 150 MG 24 hr capsule Take 1 capsule by mouth daily. 5/1/2017: Received from: External Pharmacy Received Sig: TK ONE C PO QD     Surgical History     Past Surgical History  She has a past surgical history that includes Jose Daniel-en-y procedure (4/23/2014); Cholecystectomy; Hysterectomy; and Tubal ligation.    Objective-Exam     Constitutional:  /71  Pulse 82  Resp 18  Ht 5' 4.5\" (1.638 m)  Wt 204 lb (92.5 kg)  SpO2 100%  BMI 34.48 kg/m2  Height: 5' 4.5\" (1.638 m) (9/8/2017 10:23 AM)  Initial Weight: 233 lbs (5/1/2017 12:16 PM)  Weight: 204 lb (92.5 kg) (9/8/2017 10:23 AM)  Weight loss from initial: 36 (5/1/2017 12:16 PM)  % Weight loss: 15.45 % (5/1/2017 12:16 PM)  BMI (Calculated): 34.5 (9/8/2017 10:23 AM)  SpO2: 100 % (9/8/2017 10:23 AM)  General:  Pleasant and in no acute distress   Eyes:  EOMI  ENT:  Airway 2+  Moist mucous membranes  Neck:  Supple, No LAD, No thyromegaly, No carotid bruits appreciated  Respiratory: Normal respiratory effort, no cough, wheezes or crackles  CV:  Regular rate and Rhythm,no murmurs, pulses 2+, no calf tenderness, trace LE edema  Gastrointestinal: Abdomen NT/ND, BS+  Musculoskeletal: muscle mass WNL  Skin: hair full, incisions nicely healed  Neurological: No tremor, normal gait  Psychiatric: alert and oriented X3, mood and affect normal    Counseling     We reviewed the important post op bariatric recommendations:  -eating 3 meals daily  -eating protein first, getting >60gm protein daily  -eating slowly, chewing food well  -avoiding/limiting calorie containing beverages  -drinking water 15-30 minutes before or after meals  -choosing wheat, not white with breads, crackers, pastas, micheal, bagels, tortillas, rice  -limiting restaurant or cafeteria eating to twice a week or less    We discussed the importance of restorative sleep and stress " management in maintaining a healthy weight.  We discussed the National Weight Control Registry healthy weight maintenance strategies and ways to optimize metabolism.  We discussed the importance of physical activity including cardiovascular and strength training in maintaining a healthier weight.    We discussed the importance of life-long vitamin supplementation and life-long  follow-up.    Yuliana was reminded that, to avoid marginal ulcers she should avoid tobacco at all, alcohol in excess, caffeine in excess, and NSAIDS (unless indicated for cardioprotection or othewise and opposed by a PPI).    Dorothy Coronado MD, [facility].  9/8/2017  10:42 AM     30 minutes spent with patient. >50% in counseling and coordination of care.

## 2021-06-15 NOTE — TELEPHONE ENCOUNTER
Yuliana has not scheduled a follow up appointment as planned after her September office visit.. Plan was dietitian visit. No visit since Sept. Needs f/u.

## 2021-06-15 NOTE — TELEPHONE ENCOUNTER
----- Message from Dorothy Coronado MD sent at 9/29/2020  5:22 PM CDT -----  Regarding: f/u labs  D, PTH, CMP in 6 months. Thanks!

## 2021-06-15 NOTE — PROGRESS NOTES
"Yuliana Armendariz is a 60 y.o. female who is being evaluated via a billable video visit.       How would you like to obtain your AVS? MyChart.  If dropped from the video visit, the video invitation should be resent by: Text to cell phone: [phone]  Will anyone else be joining your video visit? No     Video Start Time: 4:34 PM      Post-op Surgical Weight Loss Diet Evaluation     Assessment:  Pt presents for 7 year post-op RD visit, s/p RYGB on 4-23-14 with Dr. Sanchez. Today we reviewed current eating habits and level of physical activity, and instructed on the changes that are required for successful bariatric outcomes.    Patient Progress: has been home since October due to injury at work, doing PT  Walking dog daily  +taking phentermine    Pt's Initial Weight: 233 lbs  Weight: 185 lb (83.9 kg)  Weight loss from initial: 48  % Weight loss: 20.6 %  Weight (Patient Reported): 185 lb (83.9 kg)  Height (Patient Reported): 5' 4.5\" (1.638 m)  BMI (Based on Pt Reported Ht/Wt): 31.26      Patient Active Problem List   Diagnosis     Fatigue     GERD (gastroesophageal reflux disease)     Degenerative disc disease     Osteoarthritis     Gout     Asthma     COPD (chronic obstructive pulmonary disease) (H)     Hypertension     Back pain     Metabolic syndrome     Hyperparathyroidism, secondary (H)     Dyslipidemia     Hyperparathyroidism (H)     Low bone mass     Total knee replacement status, left     Partial idiopathic epilepsy with seizures of localized onset, not intractable, without status epilepticus (H)     Hyperlipemia     Diabetes type 2, controlled (H)     Laceration of digital nerve of finger     Hand dysfunction     Acute pain of right shoulder       Diabetes:  yes    Vitamins   Multi Vit with Iron: yes  Calcium Citrate: yes  B12: yes  D3: yes  +taking fiber gummies    Diet Recall/Time:   +states she doesn't have much of an appetite, skips breakfast, first meal will be 2-3p    Grilled chicken and salad tonight " "    Proteins/Veg/Fruits/CHO (NOT well tolerated): milk    Fluid Intake  Water: 3-4 bottles per day  Decaf coffee    Exercise: walking dog, PT for shoulder      PES statement:      (NC-1.4) Altered GI Function related to Alteration in gastrointestinal tract structure and/or function/ Decreased functional length of the GI tract as evidenced by Weight loss of 20.6% initial body weight; Gastric bypass surgery    Intervention    Discussion  1. Recommended to consume 15-20gm protein at 3 meals daily, along with protein supplement/\"planned protein containing snack\" of 15-30gm protein, to reach goal of 60-80 gm protein daily.  2. Bariatric Plate Method-  including lean/low fat protein at each meal, including a vegetable/fruit, and limiting carbohydrate intake to less than 25% of plate volume.    Assessment/Plan:    Pt to follow up for 3 months with bariatrician and dietitian    Video-Visit Details    Type of service:  Video Visit    Video End Time (time video stopped): 5:00p  Originating Location (pt. Location): Home    Distant Location (provider location):  [facility] [place]     Platform used for Video Visit: Michael    "

## 2021-06-15 NOTE — PROGRESS NOTES
Yuliana Armendariz is 60 y.o.  female who presents for a billable video visit today.    How would you like to obtain your AVS? MyChart.  If dropped from the video visit, the video invitation should be resent by: Send to e-mail at: [email or web address]  Will anyone else be joining your video visit? No      Video Start Time: 2:00 pm      Bariatric Follow Up Visit with a History of Previous Bariatric Surgery     Date of visit: 3/12/2021  Physician: Dorothy Coronado MD  Primary Care Provider:  Mayda Steward MD  Yuliana Armendariz   60 y.o.  female    Date of Surgery: 4/23/2014  Initial Weight: 231#  Initial BMI: 38.9  Today's Weight:   Wt Readings from Last 1 Encounters:   03/12/21 185 lb (83.9 kg)     Body mass index is 31.26 kg/m .      Assessment and Plan     Assessment: Yuliana is a 60 y.o. year old female who is 7 yrs s/p  Jose Daniel en Y Gastric Bypass with Dr. Sanchez  Yuliana Armendariz feels as if she has achieved the goals she hoped to accomplish through bariatric surgery and weight loss.    Encounter Diagnoses   Name Primary?     Postoperative malabsorption Yes     Metabolic syndrome      Obesity (BMI 30.0-34.9)      Hyperphagia          Current Outpatient Medications:      acetaminophen (TYLENOL) 500 MG tablet, Take 500-1,000 mg by mouth as needed., Disp: , Rfl:      ADVAIR -21 mcg/actuation inhaler, Inhale 1 puff 2 (two) times a day., Disp: , Rfl: 11     albuterol (PROVENTIL HFA;VENTOLIN HFA) 90 mcg/actuation inhaler, Inhale 2 puffs every 6 (six) hours as needed for wheezing., Disp: , Rfl:      albuterol (PROVENTIL) 2.5 mg /3 mL (0.083 %) nebulizer solution, Take 2.5 mg by nebulization every 6 (six) hours as needed for wheezing., Disp: , Rfl:      amLODIPine (NORVASC) 5 MG tablet, Take 2.5 mg by mouth daily. , Disp: , Rfl:      atorvastatin (LIPITOR) 20 MG tablet, Take 20 mg by mouth bedtime., Disp: , Rfl:      CALCIUM CITRATE/VITAMIN D3 (CITRACAL + D PETITES ORAL), Take 2 tablets by mouth 2 (two) times a day. ,  Disp: , Rfl:      carvedilol (COREG) 3.125 MG tablet, Take 3.125 mg by mouth bedtime. , Disp: , Rfl:      celecoxib (CELEBREX) 100 MG capsule, Take 1 capsule by mouth 2 (two) times a day., Disp: , Rfl:      cholecalciferol, vitamin D3, 125 mcg (5,000 unit) capsule, Take 1 capsule (5,000 Units total) by mouth daily., Disp: 90 capsule, Rfl: 3     clotrimazole (LOTRIMIN) 1 % cream, Apply 1 application topically 2 (two) times a day., Disp: , Rfl: 1     cyanocobalamin, vitamin B-12, 1,000 mcg Subl, Place 1 tablet (1,000 mcg total) under the tongue daily., Disp: 90 tablet, Rfl: prn     fluocinonide (LIDEX) 0.05 % cream, Apply 1 application topically 2 (two) times a day., Disp: , Rfl:      hydrocortisone 2.5 % cream, Apply 1 application topically as needed., Disp: , Rfl:      levETIRAcetam (KEPPRA) 1000 MG tablet, Take 1 tablet by mouth 2 (two) times a day., Disp: , Rfl: 3     losartan (COZAAR) 50 MG tablet, Take 50 mg by mouth daily. Two tabs daily, Disp: , Rfl:      miconazole (MICOTIN) 2 % powder, Apply 1 application topically as needed for itching (for rash under breasts)., Disp: , Rfl:      montelukast (SINGULAIR) 10 mg tablet, Take 10 mg by mouth bedtime., Disp: , Rfl:      omeprazole (PRILOSEC) 20 MG capsule, Take 20 mg by mouth 2 (two) times a day. , Disp: , Rfl:      pediatric multivit-iron-min (CEROVITE JR) Chew, Chew 1 tablet 2 (two) times a day., Disp: 180 each, Rfl: 3     phentermine (ADIPEX-P) 37.5 mg tablet, TAKE HALF TO ONE TABLET BY MOUTH DAILY BEFORE BREAKFAST, Disp: 90 tablet, Rfl: 1     SF 5000 PLUS 1.1 % Crea, Apply 1 application topically as needed., Disp: , Rfl: 0     traZODone (DESYREL) 50 MG tablet, at bedtime as needed., Disp: , Rfl:      venlafaxine (EFFEXOR-XR) 150 MG 24 hr capsule, Take 1 capsule by mouth daily., Disp: , Rfl: 11    Plan: Get outside and walk your dog. Talk to Dr. Steward for evaluation moods/depression, consider therapy.     Return for Next scheduled follow up.    Bariatric  "Surgery Review     Interim History/LifeChanges: 33# down from last year. 46# down overall. She had her shoulder surgery d/t injury at work. She has been down d/t COVID stress. Her mother's death anniversary is this month 4 yrs ago which gets her down.     Patient Concerns: moods are down, weight is down,   Appetite (1-10): down  GERD: on PPI    Medication changes: none    Vitamin Intake:   B-12   SL   MVI  2/d   Vitamin D  5,000   Calcium   2/d     Other                LABS: \"Reviewed  PTH remains elevated. She will double her D and make sure to get consistent calcium. She did have a shoulder surgery recently.  Nausea no  Vomiting no  Constipation no  Diarrhea no  Rashes no  Hair Loss no  Calf tenderness no  Breathing difficulty no  Reactive Hypoglycemia no  Light Headedness no   Moods down    12 point ROS as above and otherwise negative      Habits:  Alcohol: no  Tobacco: no  Caffeine decaf  NSAIDS no  Exercise Routine: getting outside daily with her dog  3 meals/day yes  Protein first yes  60 grams/day  Water Separate from meals yes  Calorie Containing Beverages no  Restaurant eating/wk none  Sleeping OK with CPAP  Stress high! Anniversary of her mother's death and COVID stress and post op shoulder surgery  CPAP: yes  Contraception: PM  DEXA:due 2023    Social History     Social History     Socioeconomic History     Marital status: Single     Spouse name: Not on file     Number of children: Not on file     Years of education: Not on file     Highest education level: Not on file   Occupational History     Not on file   Social Needs     Financial resource strain: Not on file     Food insecurity     Worry: Not on file     Inability: Not on file     Transportation needs     Medical: Not on file     Non-medical: Not on file   Tobacco Use     Smoking status: Former Smoker     Smokeless tobacco: Never Used     Tobacco comment: quit over 25 years ago   Substance and Sexual Activity     Alcohol use: Yes     Comment: Rarely "     Drug use: No     Sexual activity: Not Currently     Partners: Male     Birth control/protection: Surgical   Lifestyle     Physical activity     Days per week: Not on file     Minutes per session: Not on file     Stress: Not on file   Relationships     Social connections     Talks on phone: Not on file     Gets together: Not on file     Attends Sikhism service: Not on file     Active member of club or organization: Not on file     Attends meetings of clubs or organizations: Not on file     Relationship status: Not on file     Intimate partner violence     Fear of current or ex partner: Not on file     Emotionally abused: Not on file     Physically abused: Not on file     Forced sexual activity: Not on file   Other Topics Concern     Not on file   Social History Narrative     Not on file       Past Medical History     Past Medical History:   Diagnosis Date     Asthma      Back pain      Congestive heart failure (H)      COPD (chronic obstructive pulmonary disease) (H)      Degenerative disc disease      Diabetes type 2, controlled (H)      Dyslipidemia      GERD (gastroesophageal reflux disease)      Gout      Hyperparathyroidism (H)      Hyperparathyroidism, secondary (H)      Hypertension      Low bone mass      Metabolic syndrome      Morbid obesity (H)      Osteoarthritis      Reflux      Reflux      Problem List     Patient Active Problem List   Diagnosis     Fatigue     GERD (gastroesophageal reflux disease)     Degenerative disc disease     Osteoarthritis     Gout     Asthma     COPD (chronic obstructive pulmonary disease) (H)     Hypertension     Back pain     Metabolic syndrome     Hyperparathyroidism, secondary (H)     Dyslipidemia     Hyperparathyroidism (H)     Low bone mass     Total knee replacement status, left     Partial idiopathic epilepsy with seizures of localized onset, not intractable, without status epilepticus (H)     Hyperlipemia     Diabetes type 2, controlled (H)     Laceration of  digital nerve of finger     Hand dysfunction     Acute pain of right shoulder     Medications     Current Outpatient Medications   Medication Sig Note     acetaminophen (TYLENOL) 500 MG tablet Take 500-1,000 mg by mouth as needed.      ADVAIR -21 mcg/actuation inhaler Inhale 1 puff 2 (two) times a day.      albuterol (PROVENTIL HFA;VENTOLIN HFA) 90 mcg/actuation inhaler Inhale 2 puffs every 6 (six) hours as needed for wheezing.      albuterol (PROVENTIL) 2.5 mg /3 mL (0.083 %) nebulizer solution Take 2.5 mg by nebulization every 6 (six) hours as needed for wheezing.      amLODIPine (NORVASC) 5 MG tablet Take 2.5 mg by mouth daily.       atorvastatin (LIPITOR) 20 MG tablet Take 20 mg by mouth bedtime.      CALCIUM CITRATE/VITAMIN D3 (CITRACAL + D PETITES ORAL) Take 2 tablets by mouth 2 (two) times a day.       carvedilol (COREG) 3.125 MG tablet Take 3.125 mg by mouth bedtime.       celecoxib (CELEBREX) 100 MG capsule Take 1 capsule by mouth 2 (two) times a day.      cholecalciferol, vitamin D3, 125 mcg (5,000 unit) capsule Take 1 capsule (5,000 Units total) by mouth daily.      clotrimazole (LOTRIMIN) 1 % cream Apply 1 application topically 2 (two) times a day.      cyanocobalamin, vitamin B-12, 1,000 mcg Subl Place 1 tablet (1,000 mcg total) under the tongue daily.      fluocinonide (LIDEX) 0.05 % cream Apply 1 application topically 2 (two) times a day. 3/9/2018: Received from: [facility] Received Sig: Apply  topically two times a day.     hydrocortisone 2.5 % cream Apply 1 application topically as needed. 3/9/2018: Received from: [facility] Received Sig: Apply to inflamed skin 2-3 times daily until clear but not more than 2-3 weeks at a time     levETIRAcetam (KEPPRA) 1000 MG tablet Take 1 tablet by mouth 2 (two) times a day. 5/1/2017: Received from: External Pharmacy Received Sig: TK 1 T PO BID     losartan (COZAAR) 50 MG tablet Take 50 mg by mouth daily. Two tabs daily      miconazole (MICOTIN) 2 %  "powder Apply 1 application topically as needed for itching (for rash under breasts).      montelukast (SINGULAIR) 10 mg tablet Take 10 mg by mouth bedtime.      omeprazole (PRILOSEC) 20 MG capsule Take 20 mg by mouth 2 (two) times a day.       pediatric multivit-iron-min (CEROVITE JR) Chew Chew 1 tablet 2 (two) times a day.      phentermine (ADIPEX-P) 37.5 mg tablet TAKE HALF TO ONE TABLET BY MOUTH DAILY BEFORE BREAKFAST      SF 5000 PLUS 1.1 % Crea Apply 1 application topically as needed. 5/1/2017: Received from: External Pharmacy     traZODone (DESYREL) 50 MG tablet at bedtime as needed.      venlafaxine (EFFEXOR-XR) 150 MG 24 hr capsule Take 1 capsule by mouth daily. 5/1/2017: Received from: External Pharmacy Received Sig: GINA ONE C PO QD     Surgical History     Past Surgical History  She has a past surgical history that includes Jose Daniel-en-y procedure (4/23/2014); Cholecystectomy; Hysterectomy; Tubal ligation; Total knee arthroplasty (Left, 03/2019); and Knee arthroscopy w/ meniscal repair (Left, 01/2019).    Objective-Exam     Constitutional:  Ht 5' 4.5\" (1.638 m)   Wt 185 lb (83.9 kg)   BMI 31.26 kg/m    Height: 5' 4.5\" (1.638 m) (3/12/2021  1:00 PM)  Initial Weight: 233 lbs (3/12/2021  1:00 PM)  Weight: 185 lb (83.9 kg) (3/12/2021  1:00 PM)  Weight loss from initial: 48 (3/12/2021  1:00 PM)  % Weight loss: 20.6 % (3/12/2021  1:00 PM)  BMI (Calculated): 31.3 (3/12/2021  1:00 PM)    General:  Pleasant and in no acute distress   Psychiatric: alert and oriented X3, mood and affect normal    Counseling     We reviewed the important post op bariatric recommendations:  -eating 3 meals daily  -eating protein first, getting >60gm protein daily  -eating slowly, chewing food well  -avoiding/limiting calorie containing beverages  -drinking water 15-30 minutes before or after meals  -choosing wheat, not white with breads, crackers, pastas, micheal, bagels, tortillas, rice  -limiting restaurant or cafeteria eating to twice a " week or less    We discussed the importance of restorative sleep and stress management in maintaining a healthy weight.  We discussed the National Weight Control Registry healthy weight maintenance strategies and ways to optimize metabolism.  We discussed the importance of physical activity including cardiovascular and strength training in maintaining a healthier weight.    We discussed the importance of life-long vitamin supplementation and life-long  follow-up.    Yuliana was reminded that, to avoid marginal ulcers she should avoid tobacco at all, alcohol in excess, caffeine in excess, and NSAIDS (unless indicated for cardioprotection or othewise and opposed by a PPI).    Dorothy Coronado MD, [facility].  3/12/2021  2:16 PM    Total time spent on the date of this encounter doing: chart review, review of test results, patient visit, physical exam, education, counseling, developing plan of care, and documenting = 30 minutes.          Video-Visit Details    Type of service:  Video Visit      Originating Location (pt. Location): Home    Distant Location (provider location):  [facility] [place]     Platform used for Video Visit: Usabilla

## 2021-06-15 NOTE — TELEPHONE ENCOUNTER
Called pt and got her scheduled for visits with both  and Lotus on Friday.    Sandy Delacruz RN, CBN  [facility]  P [phone]  F [phone]

## 2021-06-16 NOTE — TELEPHONE ENCOUNTER
Called pt and let her know that  wants her to double up on her dosage and recheck her levels with her annual labs in September so I ordered 90 days with one refill for now. She is seeing  in June for her f/u phentermine visit so will discuss further at that time. I asked her to call with any further questions and she verbalized understanding.    Sandy Delacruz RN, CBN  [facility]  P [phone]  F [phone]

## 2021-06-16 NOTE — TELEPHONE ENCOUNTER
Patient needs refill for Vitamin D3.  Pharmacy is [facility] in [place] on [address]  Patient said she is supposed to take 2 a day and wants to know how many refills will be on the RX    Please contact patient to advise on refill.  Thanks!

## 2021-06-16 NOTE — PROGRESS NOTES
Here for 4 yrs s/p [facility] f/u visit and needs a refill of her phentermine.  See flowsheet.  Pt did labs at [facility] and results will be d/w pt today by .    Sandy Delacruz RN, [facility]  P [phone]  F [phone]

## 2021-06-16 NOTE — PROGRESS NOTES
4 yrs post op lab orders placed for patient in preparation for appointment with  on Friday.    Sandy Delacruz RN, [facility]  P [phone]  F [phone]

## 2021-06-16 NOTE — PROGRESS NOTES
Bariatric Follow Up Visit with a History of Previous Bariatric Surgery     Date of visit: 3/9/2018  Physician: Dorothy Coronado MD  Primary Care Provider:  MD Yuliana Campos   57 y.o.  female    Date of Surgery: 4/23/2014  Initial Weight: 231  Initial BMI: 38.9  Today's Weight:   Wt Readings from Last 1 Encounters:   03/09/18 209 lb (94.8 kg)     Body mass index is 35.32 kg/(m^2).      Assessment and Plan     Assessment: Yuliana is a 57 y.o. year old female who is 4 yrs s/p  Jose Daniel en Y Gastric Bypass with Dr. Laura Armendariz feels as if she had achieved the goals she hoped to accomplish through bariatric surgery and weight loss. She is    Encounter Diagnoses   Name Primary?     Postoperative malabsorption Yes     Feeling exhausted      Hyperphagia      Obesity (BMI 30.0-34.9)          Current Outpatient Prescriptions:      albuterol (PROVENTIL HFA;VENTOLIN HFA) 90 mcg/actuation inhaler, Inhale 2 puffs every 6 (six) hours as needed for wheezing., Disp: , Rfl:      albuterol (PROVENTIL) 2.5 mg /3 mL (0.083 %) nebulizer solution, Take 2.5 mg by nebulization every 6 (six) hours as needed for wheezing., Disp: , Rfl:      amLODIPine (NORVASC) 5 MG tablet, Take 2.5 mg by mouth daily. , Disp: , Rfl:      atorvastatin (LIPITOR) 20 MG tablet, Take 20 mg by mouth bedtime., Disp: , Rfl:      benzonatate (TESSALON) 100 MG capsule, Take 1-2 capsules by mouth as needed., Disp: , Rfl:      CALCIUM CITRATE/VITAMIN D3 (CITRACAL + D PETITES ORAL), Take 2 tablets by mouth 2 (two) times a day. , Disp: , Rfl:      carvedilol (COREG) 3.125 MG tablet, Take 3.125 mg by mouth bedtime. , Disp: , Rfl:      cholecalciferol, vitamin D3, 5,000 unit capsule, Take 1 capsule by mouth daily., Disp: , Rfl: 0     cyanocobalamin, vitamin B-12, 1,000 mcg Subl, Place 1 tablet (1,000 mcg total) under the tongue daily., Disp: 90 tablet, Rfl: prn     fluocinonide (LIDEX) 0.05 % cream, Apply 1 application topically 2 (two) times a  "day., Disp: , Rfl:      fluticasone-salmeterol (ADVAIR) 100-50 mcg/dose DISKUS, Inhale 1 puff 2 (two) times a day., Disp: , Rfl:      hydrocortisone 2.5 % cream, Apply 1 application topically as needed., Disp: , Rfl:      levETIRAcetam (KEPPRA) 1000 MG tablet, Take 1 tablet by mouth 2 (two) times a day., Disp: , Rfl: 3     losartan (COZAAR) 50 MG tablet, Take 50 mg by mouth daily. Two tabs daily, Disp: , Rfl:      miconazole (MICOTIN) 2 % powder, Apply 1 application topically as needed for itching (for rash under breasts)., Disp: , Rfl:      montelukast (SINGULAIR) 10 mg tablet, Take 10 mg by mouth bedtime., Disp: , Rfl:      omeprazole (PRILOSEC) 20 MG capsule, Take 20 mg by mouth 2 (two) times a day. , Disp: , Rfl:      pediatric multivit-iron-min (CEROVITE JR) Chew, Chew 1 tablet 2 (two) times a day., Disp: 180 each, Rfl: 3     SF 5000 PLUS 1.1 % Crea, Apply 1 application topically as needed., Disp: , Rfl: 0     venlafaxine (EFFEXOR-XR) 150 MG 24 hr capsule, Take 1 capsule by mouth daily., Disp: , Rfl: 11     VIRTUSSIN AC  mg/5 mL liquid, Take 5 mL by mouth every 4 (four) hours as needed., Disp: , Rfl: 1     phentermine (ADIPEX-P) 37.5 mg tablet, Take 1/2 to 1 tablet in the morning., Disp: 90 tablet, Rfl: 1    Plan: 7 day food diary, 7 days of activity tracker (can use your smart phone)  consult and dietitian follow up.  Return for Next scheduled follow up.    Bariatric Surgery Review     Interim History/LifeChanges: one year anniversary from her mother's death. Stressed with kids. Sleep deprived. Exhausted    Patient Concerns: weight regain. Revision reasons? Stronger appetite medicatins?    Medication changes: none    Vitamin Intake:   B-12   SL   MVI  2/d   Vitamin D  5,000   Calcium        Other                LABS: \"Reviewed  Excellent. PTH nearly normalized. From 160 to 88. D improved markably  Nausea no  Vomiting no  Constipation no  Diarrhea no  Rashes no  Hair Loss no  Calf " "tenderness no  Breathing difficulty no  Reactive Hypoglycemia yes  Light Headedness no   Moods depressed    12 point ROS as above and otherwise negative      Habits:  Alcohol: no  Tobacco: no  Caffeine decaf  NSAIDS no  Exercise Routine: \"on her feet\" all day. Walks her dog  3 meals/day yes  Protein first for the most part  60 grams/day  Water Separate from meals yes  Calorie Containing Beverages no  Restaurant eating/wk rarely  Sleeping 6 hours-not enough  Stress high-death of her mother anniversary, kids stressing her out  CPAP: NA  Contraception: PM    Social History     Social History     Social History     Marital status: Single     Spouse name: N/A     Number of children: N/A     Years of education: N/A     Occupational History     Not on file.     Social History Main Topics     Smoking status: Former Smoker     Smokeless tobacco: Never Used      Comment: quit over 25 years ago     Alcohol use Yes      Comment: Rarely     Drug use: No     Sexual activity: Not Currently     Partners: Male     Birth control/ protection: Surgical     Other Topics Concern     Not on file     Social History Narrative       Past Medical History     Past Medical History:   Diagnosis Date     Asthma      Back pain      Congestive heart failure      COPD (chronic obstructive pulmonary disease)      Degenerative disc disease      Diabetes type 2, controlled      Dyslipidemia      GERD (gastroesophageal reflux disease)      Gout      Hyperparathyroidism      Hyperparathyroidism, secondary      Hypertension      Low bone mass      Metabolic syndrome      Morbid obesity      Osteoarthritis      Reflux      Reflux      Problem List     Patient Active Problem List   Diagnosis     Fatigue     GERD (gastroesophageal reflux disease)     Degenerative disc disease     Osteoarthritis     Gout     Asthma     COPD (chronic obstructive pulmonary disease)     Hypertension     Back pain     Metabolic syndrome     Hyperparathyroidism, secondary     " Dyslipidemia     Hyperparathyroidism     Low bone mass     Medications     Current Outpatient Prescriptions   Medication Sig Note     albuterol (PROVENTIL HFA;VENTOLIN HFA) 90 mcg/actuation inhaler Inhale 2 puffs every 6 (six) hours as needed for wheezing.      albuterol (PROVENTIL) 2.5 mg /3 mL (0.083 %) nebulizer solution Take 2.5 mg by nebulization every 6 (six) hours as needed for wheezing.      amLODIPine (NORVASC) 5 MG tablet Take 2.5 mg by mouth daily.       atorvastatin (LIPITOR) 20 MG tablet Take 20 mg by mouth bedtime.      benzonatate (TESSALON) 100 MG capsule Take 1-2 capsules by mouth as needed. 3/9/2018: Received from: External Pharmacy     CALCIUM CITRATE/VITAMIN D3 (CITRACAL + D PETITES ORAL) Take 2 tablets by mouth 2 (two) times a day.       carvedilol (COREG) 3.125 MG tablet Take 3.125 mg by mouth bedtime.       cholecalciferol, vitamin D3, 5,000 unit capsule Take 1 capsule by mouth daily. 5/1/2017: Received from: External Pharmacy Received Sig: TK 1 C PO QD     cyanocobalamin, vitamin B-12, 1,000 mcg Subl Place 1 tablet (1,000 mcg total) under the tongue daily.      fluocinonide (LIDEX) 0.05 % cream Apply 1 application topically 2 (two) times a day. 3/9/2018: Received from: [facility] Received Sig: Apply  topically two times a day.     fluticasone-salmeterol (ADVAIR) 100-50 mcg/dose DISKUS Inhale 1 puff 2 (two) times a day.      hydrocortisone 2.5 % cream Apply 1 application topically as needed. 3/9/2018: Received from: [facility] Received Sig: Apply to inflamed skin 2-3 times daily until clear but not more than 2-3 weeks at a time     levETIRAcetam (KEPPRA) 1000 MG tablet Take 1 tablet by mouth 2 (two) times a day. 5/1/2017: Received from: External Pharmacy Received Sig: TK 1 T PO BID     losartan (COZAAR) 50 MG tablet Take 50 mg by mouth daily. Two tabs daily      miconazole (MICOTIN) 2 % powder Apply 1 application topically as needed for itching (for rash under breasts).       "montelukast (SINGULAIR) 10 mg tablet Take 10 mg by mouth bedtime.      omeprazole (PRILOSEC) 20 MG capsule Take 20 mg by mouth 2 (two) times a day.       pediatric multivit-iron-min (CEROVITE JR) Chew Chew 1 tablet 2 (two) times a day.      SF 5000 PLUS 1.1 % Crea Apply 1 application topically as needed. 5/1/2017: Received from: External Pharmacy     venlafaxine (EFFEXOR-XR) 150 MG 24 hr capsule Take 1 capsule by mouth daily. 5/1/2017: Received from: External Pharmacy Received Sig: TK ONE C PO QD     VIRTUSSIN AC  mg/5 mL liquid Take 5 mL by mouth every 4 (four) hours as needed. 3/9/2018: Received from: External Pharmacy Received Sig: TK 5 ML PO Q 4 H PRF COUGH     phentermine (ADIPEX-P) 37.5 mg tablet Take 1/2 to 1 tablet in the morning.      Surgical History     Past Surgical History  She has a past surgical history that includes Jose Daniel-en-y procedure (4/23/2014); Cholecystectomy; Hysterectomy; and Tubal ligation.    Objective-Exam     Constitutional:  /71  Pulse 89  Resp 18  Ht 5' 4.5\" (1.638 m)  Wt 209 lb (94.8 kg)  BMI 35.32 kg/m2  Height: 5' 4.5\" (1.638 m) (3/9/2018 12:54 PM)  Initial Weight: 233 lbs (3/9/2018 12:54 PM)  Weight: 209 lb (94.8 kg) (3/9/2018 12:54 PM)  Weight loss from initial: 24 (3/9/2018 12:54 PM)  % Weight loss: 10.3 % (3/9/2018 12:54 PM)  BMI (Calculated): 35.3 (3/9/2018 12:54 PM)  SpO2: 100 % (9/8/2017 10:23 AM)  NSAIDS: No (3/9/2018 12:54 PM)  Pain Scale: 0 (3/9/2018 12:54 PM)  General:  Pleasant and in no acute distress   Eyes:  EOMI  ENT:  Airway 2+  Moist mucous membranes  Neck:  Supple, No LAD, No thyromegaly, No carotid bruits appreciated  Respiratory: Normal respiratory effort, no cough, wheezes or crackles  CV:  Regular rate and Rhythm,2/6 murmurs, pulses 2+, no calf tenderness, 1-2+ LE edema  Gastrointestinal: Abdomen NT/ND, BS+  Musculoskeletal: muscle mass WNL  Skin: hair full, incisions nicely healed  Neurological: No tremor, normal gait  Psychiatric: alert and " oriented X3, mood and affect normal    Counseling     We reviewed the important post op bariatric recommendations:  -eating 3 meals daily  -eating protein first, getting >60gm protein daily  -eating slowly, chewing food well  -avoiding/limiting calorie containing beverages  -drinking water 15-30 minutes before or after meals  -choosing wheat, not white with breads, crackers, pastas, micheal, bagels, tortillas, rice  -limiting restaurant or cafeteria eating to twice a week or less    We discussed the importance of restorative sleep and stress management in maintaining a healthy weight.  We discussed the National Weight Control Registry healthy weight maintenance strategies and ways to optimize metabolism.  We discussed the importance of physical activity including cardiovascular and strength training in maintaining a healthier weight.    We discussed the importance of life-long vitamin supplementation and life-long  follow-up.    Yuliana was reminded that, to avoid marginal ulcers she should avoid tobacco at all, alcohol in excess, caffeine in excess, and NSAIDS (unless indicated for cardioprotection or othewise and opposed by a PPI).    Dorothy Coronado MD, [facility].  3/9/2018  1:22 PM      No images are attached to the encounter.   30 minutes spent with patient. >50% in counseling and coordination of care.

## 2021-06-17 ENCOUNTER — OFFICE VISIT - HEALTHEAST (OUTPATIENT)
Dept: SURGERY | Facility: CLINIC | Age: 61
End: 2021-06-17

## 2021-06-17 DIAGNOSIS — E66.812 OBESITY, CLASS II, BMI 35-39.9, ISOLATED (SEE ACTUAL BMI): ICD-10-CM

## 2021-06-17 DIAGNOSIS — E66.01 MORBID OBESITY (H): ICD-10-CM

## 2021-06-17 RX ORDER — QUETIAPINE FUMARATE 25 MG/1
25 TABLET, FILM COATED ORAL AT BEDTIME
Status: SHIPPED | COMMUNITY
Start: 2021-06-02

## 2021-06-17 NOTE — PROGRESS NOTES
"Post-op Surgical Weight Loss Diet Evaluation     Assessment:  Pt presents for 4 year post-op RD visit, s/p  On 4/23/14 with Dr. Sanchez. Today we reviewed current eating habits and level of physical activity, and instructed on the changes that are required for successful bariatric outcomes.    Patient Progress: Pt feels weight regain (lowest weight 185 lb post surgery) is related to stress and poor sleep. Pt works at [employer]. Pt currently taking 1 tablet phentermine per day.     Pt's Initial Weight: 233 lbs  Weight: 208 lb 8 oz (94.6 kg)  Weight loss from initial: 24.5  % Weight loss: 10.52 %    Body mass index is 35.24 kg/(m^2).     Concerns: Poor protein intake, high CHO intake, poor hydration. Furthermore, inconsistent eating schedule possibly contributing to poor sleeping habits.      Vitamins   Multi Vit with Iron: yes BID   Calcium Citrate: yes BID   B12: yes  D3: yes    Do you experience hunger?  No- pt reports feeling post meal   Do you have \"dumping\" syndrome? No    How often?n/a    With what foods: n/a   Nausea: no  Vomiting: no  Diarrhea: no  Constipation: no  Hair loss:no     Diet Recall/Time: Pt wakes up 4am  Breakfast: 3 bananas or breakfast tortilla from [employer] with egg, cheese or cereal (0-10g)   Am Snack: banana or Nutrigrain bar   Lunch: 1-3pm baked chicken or beans, rice, corn breads (10g)   Pm snack:none   Dinner: 5-7pm 1/2 hot dog (5g)   HS Snack: none     Estimated total protein intake: 25 grams       Estimated portion size per meals: 3/4 cup/meal    Incorporation of vegetables, fruits, carbohydrates into diet/meals using   Bariatric \"Plate Method\"   The patient and I discussed the importance of including lean/low fat protein at each meal, including a vegetable/fruit, and limiting carbohydrate intake to less than 25% of plate volume. Always keeping within approved perimeters of post op meal portion sizes according to 3/6/9/12 months post op guidelines.    Healthy Fats: margarine " "    Meals per week away from home:   Recommended limiting eating out to no more than 2x/week.    Meal Duration:30 minutes  Encouraged slowing meal times down, 20-30 minutes, chewing to applesauce consistency.     Fluid-meal separation:  Fluids are  30min before and 30 minutes after meals.  The patient and I reviewed the anatomy of the bypass and why  fluids from a meal is so important.    Fluid Intake  Water: 16 oz   Caffeine: 2-4 cups   Alcohol: none   Carbonation: none   Milk: none   Juice: none     Discussed the importance of adequate hydration after surgery and the goal of 64+ oz of fluid daily.   The patient understands the importance of avoiding all carbonated, caffeinated, and sweetened drinks; and instead choosing  64oz plain water.    Exercise  Pt walks her dogs daily.     Pt's understands that 30-60 minutes of daily activity is an important part of bariatric surgery success.   Encouraged pt to incorporate strength training exercise along with cardiovascular exercise as well, most days of the week.      PES statement:    1. (NB-1.7) Undesirable food choices related to Failure to adjust for lifestyle changes as evidenced by low protein intake at meals; skipping meals; frequent grazing;  mindless eating .     Intervention    Discussion  1. Discussed 18 months and beyond  Post-Op Nutritional Guidelines for RNY  2. Recommended to consume 15-20gm protein at 3 meals daily, along with protein supplement/\"planned protein containing snack\" of 15-30gm protein, to reach goal of 60-80 gm protein daily.  3. Educated on post-op vitamin regimen: Multi Vit + iron 2x/day, calcium citrate 400-600 mg 2x/day, 9094-2726 mcg of Sublingual B-12 daily, and 5000 IU Vitamin D3 daily (MVI and calcium can be taken at the same time BID)  4. Reviewed lean protein sources- encouraged using a protein supplement as a meal replacement  5. Discussed importance of staying hydrated in r/t reduced fatigue   6. Provided pt " "with food journal   7. Bariatric Plate Method-  including lean/low fat protein at each meal, including a vegetable/fruit, and limiting carbohydrate intake to less than 25% of plate volume. Approved perimeters of post op meal portion sizes according to 18 months and beyond post op guidelines.  Instructions  1. Include 15-20gm protein at each meal, along with protein supplement/\"planned protein containing snack\" of 15-30gm protein, to reach goal of 60-80 gm protein daily.  2. Increase fluid intake to 64oz daily: choose plain or calorie/carbonation-free beverages.  3. Incorporate daily structured activity, 30-60 minutes most days of the week  4. Recommended pt to start taking: Multi Vit + iron 2x/day, calcium citrate 400-600 mg 2x/day, 7817-5936 mcg of Sublingual B-12 daily, and 5000 IU Vitamin D3 daily. (MVI and calcium can be taken at the same time)  5. Read food labels more consistently: keeping total fat grams <10, total sugar grams <10, fiber >3gm per serving.  6. Increase vegetable/fruit intake, by having a vegetable or fruit with each meal daily.  7. Practice plate method: 1/2 plate lean/low fat protein source, vegetable/fruit, <25% of plate complex carbohydrates.  8. Separate fluids 30 minutes before/after meal times.  9. Practice eating off of smaller plates/bowls, chewing to applesauce consistency, taking 20-30 minutes to eat in a calm/relaxed environment without distractions of tv/email/cell phone.    Handouts provided:  18 months and beyond  Post-Op Nutritional Guidelines for RNY  Food journal  Lean protein sources  List of protein supplements     Monitor/Evaluation    Pt to follow up in 1 month with RD for weight management       Time In: 10:00am  Time Out: 10:45am      ABN signed: Yes      "

## 2021-06-18 NOTE — PROGRESS NOTES
Non-surgical Weight Loss Follow Up Diet Evaluation    Assessment:  This patient is a 57 y.o. female is being seen today for follow-up non-surgical nutritional evaluation. Today we reviewed the patients current eating habits and level of physical activity, and instructed on the changes that are required for successful weight loss outcomes.    Pt is 4 years s/p RNY surgery.     Phentermine: Taking daily.     Pt's Initial Weight: 233 lbs  Weight: 202 lb (91.6 kg)  Weight loss from initial: 31  % Weight loss: 13.3 %  BMI: Body mass index is 34.14 kg/(m^2).  IBW: 120 lbs    Personal goal weight: < 200 lbs      Pt Active Problem List Diagnosis:     Patient Active Problem List   Diagnosis     Fatigue     GERD (gastroesophageal reflux disease)     Degenerative disc disease     Osteoarthritis     Gout     Asthma     COPD (chronic obstructive pulmonary disease)     Hypertension     Back pain     Metabolic syndrome     Hyperparathyroidism, secondary     Dyslipidemia     Hyperparathyroidism     Low bone mass       Estimated RMR ([place]-St Jeor equation): 1500 calories  Protein requirements (.5grams to .9grams per pound IBW, 20-30% of calories, minimum of 60-80gm per day):  60-90 grams    Progress made since last visit: Pt reports high stress d/t passing of family members. 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 Pt states protein shakes are too expensive.     Concerns: low protein intake, grazing between meals.     Diabetes  Testing Blood Sugars: Yes   If so, how often? Daily- pt waiting for new pen to test blood sugars.     Diet Recall/Time: Pt wakes up at 4-4:30am  Breakfast: 1 egg, 1/2 thin bagel (7g)  Am Snack: carrots with dill dip or apple slices   Lunch: 1/2 deli meat sandwich (15g)   Pm snack: none   Dinner: baked potato, frozen corn, steak (20g)   HS Snack: none     Protein: 40 grams    Meals per week away from home: none      Recommended limiting eating out  to no more than 2x/week.  Patient and I reviewed the importance of eating three consistent meals per day; as well as meal timing to be spaced 4-5 hours apart.  Snack choices: 100-150 calories (1-2x/day if physically hungry), incorporating a fruit/vegetable w/ protein source.    Meal Duration: 30 minutes    Portion Sizes problematic? YES per patient/diet recall  Encouraged slowing meal times down, 20-30 minutes, chewing to applesauce consistency.   To aid in proper portion control and slow meal time down discussed consuming meals off smaller plates, use toddler/children utensils and set utensils down after each bite.    Protein, vegetables/fruits, carbohydrates:   The patient and I discussed the importance of including lean/low fat protein at each meal and limiting carbohydrate intake to less than 25% of plate volume.       Vitamins/Mineral Supplementation: Taking all bariatric vitamins as prescribed.     Beverages (Type/Oz. per day)  Water: 48-64 oz   Coffee: 1 cup decaf in morning   Tea: none   Milk: none   Regular soda: none   Diet soda: none   Juice: none   Jhon-Aid/lemonade/etc: none   Alcohol: none     Discussed the importance of adequate hydration and the goal of 64+ oz of fluid daily.   The patient understands the importance of  avoiding all sweetened and alcoholic drinks, and instead choosing 64 oz plain water.    Exercise  Walking dogs daily.     Pt's understands that 45-60 minutes of daily activity is an important part of weight loss success.   Encouraged pt to incorporate  strength training exercise in addition to cardiovascular exercise most days of the week.    PES statement:     1.  (NB-1.7) Undesirable food choices related to Failure to adjust for lifestyle changes as evidenced by low protein intake at meals; large portions;frequent grazing;  mindless eating .         Intervention:  Discussion:  1. Discussed benefits of eating three balanced meals/day with 1 protein supplement   2. Reviewed lean  protein sources.  15-20gm protein at 3 meals daily. 60-80 grams daily total.  3. Educated pt on food labels: keeping total fat grams <10, total sugar grams <10, fiber >3gm per serving.  4. Plate Method: The patient and I discussed the importance of including lean/low  fat protein at each meal and limiting carbohydrate intake to less  than 25% of plate volume.    Instructions/Goals:   1. Include 15-20gm protein at each meal.  2. Increase vegetable/fruit intake, by having a vegetable or fruit with each meal daily. Recommended pt to increase vegetable/fruit intake to 4-5 servings daily.  3. Increase fluid intake to 64oz daily: choose plain or calorie/alcohol-free beverages.  4. Incorporate daily structured activity, 45-60 minutes most days of the week  5. Practice plate method: 1/2 plate lean/low fat protein source, vegetable/fruit, <25% of plate complex carbohydrates.  6. Carbohydrates from grain sources at meal times to be no more than 1 Carb Choice, ie: 15-20 gm total carbohydrate per serving  7. Practice eating off of smaller plates/bowls, chewing to applesauce consistency, taking 20-30 minutes to eat in a calm/relaxed environment without distractions of tv/email/cell phone.    Monitor/Evaluation:    Pt will f/u in one month with bariatrician, and f/u in two months with RD.    Plan for next visit with RD:    Review plate method   Review carbohydrates/fiber  Exercise      Time In: 11:30am  Time Out: 12:00pm      ABN signed: Yes

## 2021-06-19 NOTE — LETTER
Letter by Lotus Martinez RD at      Author: Louts Martinez RD Service: -- Author Type: --    Filed:  Encounter Date: 7/9/2019 Status: (Other)         Yuliana Armendariz  [address] [place] [address]  [place] [address]               July 9, 2019      Dear Yuliana:    We are sorry that you missed your appointment with Lotus Martinez on 7/9/2019. Your health and follow-up medical care are important to us. Please call our office as soon as possible so that we may reschedule your appointment. If you have already rescheduled your appointment, please disregard this letter.    Sincerely,        [facility]

## 2021-06-19 NOTE — PROGRESS NOTES
"Non-surgical Weight Loss Follow Up Diet Evaluation    Assessment:  This patient is a 58 y.o. female is being seen today for follow-up non-surgical nutritional evaluation. Today we reviewed the patients current eating habits and level of physical activity, and instructed on the changes that are required for successful weight loss outcomes.    Pt is 4 years s/p RNY    Phentermine: taking daily.     Pt's Initial Weight: 233 lbs  Weight: 211 lb (95.7 kg)  Weight loss from initial: 22  % Weight loss: 9.44 %  BMI: Body mass index is 35.66 kg/(m^2).  IBW: 120 lbs    Personal goal weight: <200 lbs     Pt Active Problem List Diagnosis:     Patient Active Problem List   Diagnosis     Fatigue     GERD (gastroesophageal reflux disease)     Degenerative disc disease     Osteoarthritis     Gout     Asthma     COPD (chronic obstructive pulmonary disease) (H)     Hypertension     Back pain     Metabolic syndrome     Hyperparathyroidism, secondary (H)     Dyslipidemia     Hyperparathyroidism (H)     Low bone mass       Estimated RMR (CataÃ±o-St Jeor equation): 1500 calories  Protein requirements (.5grams to .9grams per pound IBW, 20-30% of calories, minimum of 60-80gm per day):  60-90 grams    Progress made since last visit: Pt went on vacation and reports increased stress, which may have contributed to weight regain. Pt feels like she's been eating healthy. Pt reports being on a \"fruit kick\".   Concerns: high carb intake, poor protein intake, snacking between meals.     Diet Recall/Time:   Breakfast: greek yogurt, banana (15g)   Am Snack: mixed fruit cup  Lunch: 1/2 sandwich (10g)  Pm snack:none   Dinner: 1/2 baked poked chop, lima beans, corn bread (10g)  HS Snack: fruit     Protein: 20-30 grams    Meals per week away from home: none      Recommended limiting eating out to no more than 2x/week.  Patient and I reviewed the importance of eating three consistent meals per day; as well as meal timing to be spaced 4-5 hours " apart.  Snack choices: 100-150 calories (1-2x/day if physically hungry), incorporating a fruit/vegetable w/ protein source.    Meal Duration: 30 minutes    Portion Sizes problematic? YES per patient/diet recall  Encouraged slowing meal times down, 20-30 minutes, chewing to applesauce consistency.   To aid in proper portion control and slow meal time down discussed consuming meals off smaller plates, use toddler/children utensils and set utensils down after each bite.    Protein, vegetables/fruits, carbohydrates:   The patient and I discussed the importance of including lean/low fat protein at each meal and limiting carbohydrate intake to less than 25% of plate volume.       Vitamins/Mineral Supplementation: taking all vitamins.     Beverages (Type/Oz. per day)  Water: 64 oz   Coffee: 1 cup   Tea: none   Milk: none   Regular soda: none   Diet soda: none     Discussed the importance of adequate hydration and the goal of 64+ oz of fluid daily.   The patient understands the importance of  avoiding all sweetened and alcoholic drinks, and instead choosing 64 oz plain water.    Exercise  Pt goes walking. Pt starts PT next week.     Pt's understands that 45-60 minutes of daily activity is an important part of weight loss success.   Encouraged pt to incorporate  strength training exercise in addition to cardiovascular exercise most days of the week.    PES statement:     1.  (NI-5.7.1) Inadequate protein intake related to Food and nutrition related knowledge deficit concerning appropriate amount of protein or Not ready for diet/lifestyle change as evidenced by Estimated intake of protein insufficient to meet requirements.     Intervention:  Discussion:  1. Educated pt on benefits of using a protein shake as a meal replacement  2. Reviewed lean protein sources.  20 gm protein at 3 meals daily + 1 protein supplement. 60-80 grams daily total.  3. Carbohydrate Countin carbohydrate choice/serving = 15-20gm total carbohydrate    4. Reviewed how to read a food label.   5. Gave food journal homework, to be completed for f/u appointment.  6. Plate Method: The patient and I discussed the importance of including lean/low  fat protein at each meal and limiting carbohydrate intake to less  than 25% of plate volume.    Instructions/Goals:   1. Include 20 gm protein at each meal.  2. Increase vegetable/fruit intake, by having a vegetable or fruit with each meal daily. Recommended pt to increase vegetable/fruit intake to 4-5 servings daily.  3. Increase fluid intake to 64oz daily: choose plain or calorie/alcohol-free beverages.  4. Incorporate daily structured activity, 45-60 minutes most days of the week  5. Read food labels more consistently: keeping total fat grams <10, total sugar grams <10, fiber >3gm per serving.   6. Fill out food journal and bring to next month's visit.  7. Practice plate method: 1/2 plate lean/low fat protein source, vegetable/fruit, <25% of plate complex carbohydrates.  8. Carbohydrates from grain sources at meal times to be no more than 1 Carb Choice, ie: 15-20 gm total carbohydrate per serving  9. Practice eating off of smaller plates/bowls, chewing to applesauce consistency, taking 20-30 minutes to eat in a calm/relaxed environment without distractions of tv/email/cell phone.    Handouts Provided:  Plate Method  Food journal  Lean Protein sources  Food Label    Monitor/Evaluation:    Pt will f/u in one month with RD    Plan for next visit with RD:    Review  food journal homework.  Exercise      Time In: 2:30pm  Time Out: 3:00pm      ABN signed: Yes         Detail Level: Detailed Detail Level: Simple

## 2021-06-21 NOTE — PROGRESS NOTES
"Non-surgical Weight Loss Follow Up Diet Evaluation    Assessment:  This patient is a 58 y.o. female is being seen today for follow-up non-surgical nutritional evaluation. Today we reviewed the patients current eating habits and level of physical activity, and instructed on the changes that are required for successful weight loss outcomes.    Pt is 4 years s/p RNY    Phentermine: taking; no concerns.     Pt's Initial Weight: 233 lbs  Weight: 212 lb (96.2 kg)  Weight loss from initial: 21  % Weight loss: 9.01 %  BMI: Body mass index is 35.83 kg/(m^2).  IBW: 120 lbs    Personal goal weight: not discussed     Pt Active Problem List Diagnosis:     Patient Active Problem List   Diagnosis     Fatigue     GERD (gastroesophageal reflux disease)     Degenerative disc disease     Osteoarthritis     Gout     Asthma     COPD (chronic obstructive pulmonary disease) (H)     Hypertension     Back pain     Metabolic syndrome     Hyperparathyroidism, secondary (H)     Dyslipidemia     Hyperparathyroidism (H)     Low bone mass       Estimated RMR (Dawes-St Jeor equation): 1500 calories  Protein requirements (.5grams to .9grams per pound IBW, 20-30% of calories, minimum of 60-80gm per day):  60-90 grams    Progress made since last visit: Pt reports losing weight, but wearing brace, which she reports led to error with weight. Pt feels like she's been reducing her portion sizes. 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 Pt has knee surgery scheduled in January.     Concerns: grazing, poor protein intake, poor fluid intake, no set exercise routine besides walking.     Diet Recall/Time:   Breakfast: flavored oatmeal, 1/2 banana or toast (5g)   Am Snack: small salad with dressing or cheese (0-5g)   Lunch: 1/2 baked chicken, 1/2 slice bread (15g)   Pm snack:none   Dinner: peas, rice, pork chop (15g)   HS Snack: none     Protein: 30-40 grams    Recommended limiting eating out to no more than 2x/week.  Patient and I reviewed " the importance of eating three consistent meals per day; as well as meal timing to be spaced 4-5 hours apart.  Snack choices: 100-150 calories (1-2x/day if physically hungry), incorporating a fruit/vegetable w/ protein source.    Meal Duration: 30 minutes    Portion Sizes problematic? YES per patient/diet recall  Encouraged slowing meal times down, 20-30 minutes, chewing to applesauce consistency.   To aid in proper portion control and slow meal time down discussed consuming meals off smaller plates, use toddler/children utensils and set utensils down after each bite.    Protein, vegetables/fruits, carbohydrates:   The patient and I discussed the importance of including lean/low fat protein at each meal and limiting carbohydrate intake to less than 25% of plate volume.       Vitamins/Mineral Supplementation: taking bariatric vitamins.     Beverages (Type/Oz. per day)  Water: 32 oz   Coffee: 1 cup decaf     Discussed the importance of adequate hydration and the goal of 64+ oz of fluid daily.   The patient understands the importance of  avoiding all sweetened and alcoholic drinks, and instead choosing 64 oz plain water.    Exercise  Pt goes on daily walks. Pt also doing PT for preparation knee surgery.     Pt's understands that 45-60 minutes of daily activity is an important part of weight loss success.   Encouraged pt to incorporate  strength training exercise in addition to cardiovascular exercise most days of the week.    PES statement:     1. (NB-1.7) Undesirable food choices related to Failure to adjust for lifestyle changes as evidenced by low protein intake at meals; large portions; skipping meals; frequent grazing;  mindless eating ; drinking with meals, not chewing each bite to applesauce consistency.     Intervention:  Discussion:  1. Educated pt on benefit of protein and hydration for energy  2. Discussed water intake, with emphasis on 64 oz/day   3. Reviewed lean protein sources.  20-30 gm protein at 3  meals daily. 60-80 grams daily total.  4. Educated pt on food labels: keeping total fat grams <10, total sugar grams <10, fiber >3gm per serving.  5. Carbohydrate Countin carbohydrate choice/serving = 15-20gm total carbohydrate   6. Plate Method: The patient and I discussed the importance of including lean/low  fat protein at each meal and limiting carbohydrate intake to less  than 25% of plate volume.    Instructions/Goals:   1. Include 20-30 gm protein at each meal.  2. Increase vegetable/fruit intake, by having a vegetable or fruit with each meal daily. Recommended pt to increase vegetable/fruit intake to 4-5 servings daily.  3. Increase fluid intake to 64oz daily: choose plain or calorie/alcohol-free beverages.  4. Incorporate daily structured activity, 45-60 minutes most days of the week  5. Practice plate method: 1/2 plate lean/low fat protein source, vegetable/fruit, <25% of plate complex carbohydrates.  6. Carbohydrates from grain sources at meal times to be no more than 1 Carb Choice, ie: 15-20 gm total carbohydrate per serving  7. Practice eating off of smaller plates/bowls, chewing to applesauce consistency, taking 20-30 minutes to eat in a calm/relaxed environment without distractions of tv/email/cell phone.    Goals set by patient:  1. Increase protein; try protein powder   2. Increase water intake to 64 oz  3. Work on reducing carbohydrate to     Handouts Provided:  Goal sheet   Plate method    Monitor/Evaluation:    Pt will f/u in one month with bariatrician, and PRN with RD.     Plan for next visit with RD:    Review plate method and goals  Educate pt on food labels  Exercise      Time In: 1:00pm  Time Out: 1:30pm      ABN signed: Yes

## 2021-06-26 NOTE — PROGRESS NOTES
"Yuliana Armendariz is a 60 y.o. female who is being evaluated via a billable telephone visit.      The patient has been notified of following:     \"This telephone visit will be conducted via a call between you and your physician/provider. We have found that certain health care needs can be provided without the need for a physical exam.  This service lets us provide the care you need with a short phone conversation.  If a prescription is necessary we can send it directly to your pharmacy.  If lab work is needed we can place an order for that and you can then stop by our lab to have the test done at a later time.    If during the course of the call the physician/provider feels a telephone visit is not appropriate, you will not be charged for this service.\"     Yuliana Armendariz complains of    Chief Complaint   Patient presents with     Bariatric     Return bariatric       I have reviewed and updated the patient's Past Medical History, Social History, Family History and Medication List.      1. Obesity, Class II, BMI 35-39.9, isolated (see actual BMI)     2. Morbid obesity (H)          I have reviewed the note as documented above.  This accurately captures the substance of my conversation with the patient.    Signature:  Dorothy Coronado MD, FAAFP      BARIATRIC FOLLOW UP MEDICATION MONITORING FOLLOW UP    ID: Yuliana Armendariz is a 60 y.o. year old female with  has a past medical history of Asthma, Back pain, Congestive heart failure (H), COPD (chronic obstructive pulmonary disease) (H), Degenerative disc disease, Diabetes type 2, controlled (H), Dyslipidemia, GERD (gastroesophageal reflux disease), Gout, Hyperparathyroidism (H), Hyperparathyroidism, secondary (H), Hypertension, Low bone mass, Metabolic syndrome, Morbid obesity (H), Osteoarthritis, Reflux, and Reflux.  History of Bariatric surgery: RYGB 4/23/2014 Dr. Sanchez  Weight loss Medication(s): phentermine    HPI/Interim: Rotator cuff surgery in October. Having a hard " time this month d/t her mom and aunt's death anniversaries. On work restrictions  CAD no known  Renal Failure (Metformin) GFR >60  Glaucoma (Phentermine/Topamax) no  Bulimia (Wellbutrin) no  Pancreatitis (GLP-1 RA) no  HTN treated and controlled  Contraception hyst  Narcotics NA  Seizures no  Migraine no  Stimulants phentermine      Assessment: 61 yo female 7 yrs s/p RYGB maintaining a 14# weight loss    Plan:  DIET: 3 meals, protein first   EXERCISE: continue to walk the dog   REFERRAL: dietitian   PHARMACOTHERAPY: refill phentermine    Patient Active Problem List   Diagnosis     Fatigue     GERD (gastroesophageal reflux disease)     Degenerative disc disease     Osteoarthritis     Gout     Asthma     COPD (chronic obstructive pulmonary disease) (H)     Hypertension     Back pain     Metabolic syndrome     Hyperparathyroidism, secondary (H)     Dyslipidemia     Hyperparathyroidism (H)     Low bone mass     Total knee replacement status, left     Partial idiopathic epilepsy with seizures of localized onset, not intractable, without status epilepticus (H)     Hyperlipemia     Diabetes type 2, controlled (H)     Laceration of digital nerve of finger     Hand dysfunction     Acute pain of right shoulder     Obesity (BMI 35.0-39.9) with comorbidity (H)      Current Outpatient Medications on File Prior to Visit   Medication Sig Dispense Refill     acetaminophen (TYLENOL) 500 MG tablet Take 500-1,000 mg by mouth as needed.       ADVAIR -21 mcg/actuation inhaler Inhale 1 puff 2 (two) times a day.  11     albuterol (PROVENTIL HFA;VENTOLIN HFA) 90 mcg/actuation inhaler Inhale 2 puffs every 6 (six) hours as needed for wheezing.       albuterol (PROVENTIL) 2.5 mg /3 mL (0.083 %) nebulizer solution Take 2.5 mg by nebulization every 6 (six) hours as needed for wheezing.       amLODIPine (NORVASC) 5 MG tablet Take 2.5 mg by mouth daily.        atorvastatin (LIPITOR) 20 MG tablet Take 20 mg by mouth bedtime.       CALCIUM  CITRATE/VITAMIN D3 (CITRACAL + D PETITES ORAL) Take 2 tablets by mouth 2 (two) times a day.        carvedilol (COREG) 3.125 MG tablet Take 3.125 mg by mouth bedtime.        celecoxib (CELEBREX) 100 MG capsule Take 1 capsule by mouth 2 (two) times a day.       cholecalciferol, vitamin D3, 125 mcg (5,000 unit) capsule Take 1 capsule (5,000 Units total) by mouth 2 (two) times a day. 180 capsule 1     clotrimazole (LOTRIMIN) 1 % cream Apply 1 application topically 2 (two) times a day.  1     CONTOUR NEXT TEST STRIPS strips daily. TEST ONCE DAILY       cyanocobalamin, vitamin B-12, 1,000 mcg Subl Place 1 tablet (1,000 mcg total) under the tongue daily. 90 tablet prn     fluocinonide (LIDEX) 0.05 % cream Apply 1 application topically 2 (two) times a day.       hydrocortisone 2.5 % cream Apply 1 application topically as needed.       levETIRAcetam (KEPPRA) 1000 MG tablet Take 1 tablet by mouth 2 (two) times a day.  3     losartan (COZAAR) 50 MG tablet Take 50 mg by mouth daily. Two tabs daily       miconazole (MICOTIN) 2 % powder Apply 1 application topically as needed for itching (for rash under breasts).       montelukast (SINGULAIR) 10 mg tablet Take 10 mg by mouth bedtime.       omeprazole (PRILOSEC) 20 MG capsule Take 20 mg by mouth 2 (two) times a day.        pediatric multivit-iron-min (CEROVITE JR) Chew Chew 1 tablet 2 (two) times a day. 180 each 3     phentermine (ADIPEX-P) 37.5 mg tablet TAKE HALF TO ONE TABLET BY MOUTH DAILY BEFORE BREAKFAST 90 tablet 1     QUEtiapine (SEROQUEL) 25 MG tablet Take 25 mg by mouth at bedtime.       SF 5000 PLUS 1.1 % Crea Apply 1 application topically as needed.  0     traZODone (DESYREL) 50 MG tablet at bedtime as needed.       venlafaxine (EFFEXOR-XR) 150 MG 24 hr capsule Take 1 capsule by mouth daily.  11     No current facility-administered medications on file prior to visit.       Aspirin, Latex, and Vicodin [hydrocodone-acetaminophen]         DIETARY HISTORY  Meals Per Day:  "3  Eating Protein First?: cereal (oatmeal or soft) L: salad, chicken,  D: crab salad  Food Diary: B:see above L:\" D:\"  Snacks Per Day: yes  Typical Snack: nonte  Fluid Intake: 4 bottles  Portion Control: problematic  Calorie Containing Beverages: no  Alcohol per week: no  Typical Protein Food Choices: variety  Choosing Whole Grains: yes  Grocery Shopping is done by: herself  Food Preparation is done by: herself  Meals at Restaurant/Cafeteria/Take Out Per Week: no  Eating at the Table:   TV is Off During Meals:     Positive Changes Since Last Visit: taking her vitamins  Struggling With: stress    Knowledgeable in Reading Food Labels: yes  Getting Adequate Protein: yes  Sleeping 7-8 hours/day not well d/t ?stress using CPAP  Stress management walks the dog    PHYSICAL ACTIVITY PATTERNS:  Cardiovascular: walks the dog  Strength Training: PT for rotator cuff      ROS:  Appetite suppression:   phentermine  Dry mouth     yes  Headache  no  Nausea    no  Vomiting   no  Constipation   no  Diarrhea    no  Vision changes    no  Feeling jittery, hyper, or irritable   no  Dizziness   no  Sweating   no  Nathan in blood pressure   no  Racing heartbeat   no  Tics   no    Physical exam with most recent vitals and today's stated weight:  Height: 5' 4.5\" (1.638 m) (6/10/2021 12:00 PM)  Initial Weight: 233 lbs (6/10/2021 12:00 PM)  Weight: 217 lb 14.4 oz (98.8 kg) (6/17/2021  1:12 PM)  Weight loss from initial: 48 (6/10/2021 12:00 PM)  % Weight loss: 20.6 % (6/10/2021 12:00 PM)  BMI (Calculated): 31.3 (6/10/2021 12:00 PM)  SpO2: 99 % (6/17/2021  1:12 PM)  EGD 5/18/2021  FINAL DIAGNOSIS     A.  Small bowel, jejunum, stomach jejunal anastomosis, biopsy:    Inflamed granulation tissue    Negative for malignancy     B.  GE Junction, esophagus stomach anastomosis, biopsy:    Inflamed granulation tissue    Negative for malignancy     C.  Esophagus, mid esophagus, biopsy:    Esophageal mucosa with no diagnostic abnormality             GEN:60 " yo female 7 yrs s/p rygb with weight regain. Maintaining a 14# weight loss.   Significant weight gain 32# since rotator cuff surgery and no structure d/t not going to work.  Dietitian visit. Introduced to 24 week program. Refill phentermine. Suggest structure.     Total time spent on the date of this encounter doing: chart review, review of test results, patient visit, physical exam, education, counseling, developing plan of care, and documenting = 20 minutes.

## 2021-06-26 NOTE — PATIENT INSTRUCTIONS - HE
[facility] Bariatric Care  Nutritional Guidelines  Gastric Bypass 18 Months Post Op and Beyond    General Guidelines and Helpful Hints:    Eat 3 meals per day + protein supplement(s). No snacks between meals.  o Do not skip meals.  This can cause overeating at the next meal and will prevent adequate protein and nutritional intake.    Aim for 60-80 grams of protein per day.  o Always eat your protein first. This assists with optimal nutrition and helps you stay full longer.  o Depending on your portion size, you may need to drink approved protein supplement between meals to achieve protein goals. Follow recommendations of your Dietitian.     Eat your protein first, and then follow with fiber.   o It is not necessary to count your fiber, but 15-20 grams per day is recommended.    o Add fiber by including fruits, vegetables, whole grains, and beans.     Portions should remain about 1 cup per meal. Use measuring cups to be accurate.    Continue to use saucer/salad plates, infant/toddler silverware to keep portion sizes small and take small bites.    Eat S-L-O-W-L-Y to make each meal last 20-30 minutes. Always stop eating when satisfied.    Continue to use caution with foods containing skins, peels or membranes. Chew well!    Aim for 64 oz. of calorie-free fluids daily.  o Continue to avoid caffeine and carbonation. If you choose to drink alcohol, do so in moderation.   o Remember to avoid drinking during meals, 15-30 minutes before and 30 minutes after.    Exercise is knapp for continued weight loss and weight maintenance. Aim for 30-60 minutes of physical activity most days of the week. Include cardiovascular and strength training.    If having trouble tolerating meat, try using a crock-pot, tinfoil tent, steamer or other moist cooking method to create tender meats. Add broth or low-fat gravy to help meat stay moist.     Avoid high sugar and high fat foods to prevent dumping syndrome.  o Check nutrition labels for less  than 10 grams of sugar and less than 10 grams of fat per serving.    Continue Taking Vitamins/Minerals:  o 5321-7617 mcg of Sublingual B-12 daily  o 1 Multivitamin with Iron twice daily (chewable or swallow tabs)  o 500-600 mg Calcium Citrate twice daily (chewable or swallow tabs)  o 5000 IU Vitamin D3 daily    Sample Grocery List    Protein:    Fat free Greek or light yogurt (less than 10 grams sugar)    Fat free or low-fat cottage cheese    String cheese or reduced fat cheese slices    Tuna, salmon, crab, egg, or chicken salad made with light or fat free mayonnaise    Egg or Egg Substitute    Lean/extra lean turkey, beef, bison, venison (ground, sirloin, round, flank)    Pork loin or tenderloin (grilled, baked, broiled)    Fish such as salmon, tuna, trout, tilapia, etc. (grilled, baked, broiled)    Tender cuts of lean (skinless) turkey or chicken    Lean deli meats: turkey, lean ham, chicken, lean roast beef    Beans such as kidney, garbanzo, black, casey, or low-fat/fat free refried beans    Peanut butter (natural preferred). Limit to 1 Tbsp. per day.    Low-fat meatloaf (made with lean ground beef or turkey)    Sloppy Joes made with low-sugar ketchup and lean ground beef or turkey    Soy or vegetable protein (i.e. vegan crumbles, soy/veggie burger, tofu)    Hummus    Vegetables:    Fresh: cooked or raw (as tolerated)    Frozen vegetables    Canned vegetables (low sodium or no salt added, rinse before cooking/eating)    (Ok to have skins/peels/membranes/seeds - just chew well)    Fruits:    Fresh fruit    Frozen fruit (no sugar added)    Canned fruit (packed in its own juice, NOT syrup)    (Ok to have skins/peels/membranes/seeds - just chew well)    Starch:    Unsweetened whole-grain hot cereal (or high fiber cold cereal, dry)    Toasted whole wheat bread or [place] Thins    Whole grain crackers    Baked /boiled/mashed potato/sweet potato    Cooked whole grain pasta, brown rice, or other cooked whole  grains    Starchy vegetables: corn, peas, winter squash    Protein Supplement:     Ready to drink protein shake with:  o 15-30 grams protein per serving  o Less than 10 grams total carbohydrate per serving     Protein powder mixed with:  o  Skim or 1% milk  o Low fat or fat free Lactaid milk, plain or no sugar added soymilk  o Water     Fats: (use in moderation)    1 teaspoon of soft tub margarine    1 teaspoon olive oil, canola oil, or peanut oil    1 tablespoon of low-fat álvarez or salad dressing     Sample Menu for 18+ months after Gastric Bypass    You do NOT need to eat/drink the full portion sizes listed below  Always stop when you are satisfied    Breakfast   cup 1% cottage cheese     cup mixed berries   Lunch 2 oz lean roast beef on   [place] Thin with 1 tsp. light álvarez    small tomato, chopped, mixed with 1 tsp. light vinaigrette dressing   Supplement Approved protein supplement (if needed between meals)   Dinner 2 oz grilled salmon    cup salad greens with 1 tsp. light salad dressing and 1 tsp. ground flax seed    cup quinoa or brown rice     Breakfast   cup egg substitute with   cup sautéed chopped vegetables  2 light [place] Krisp crackers   Lunch Tuna Melt:   cup tuna mixed with 1 tsp. light álvarez over   [place] Thin. Top with 2-3 slices cucumber and 1 oz slice of low fat cheese   Supplement 1 cup skim milk (if needed between meals)   Dinner 3 oz  grilled, broiled, or baked seasoned skinless chicken breast    cup asparagus     Breakfast   cup plain oatmeal made with skim or 1% milk with 1 Tbsp. flavored/unflavored protein powder added  1 mozzarella string cheese   Lunch 2 oz deli turkey breast  1/3 cup salad with 1 tsp. light salad dressing, 1/8 of a whole avocado and 1 Tbsp. sunflower seeds   Dinner 3 oz. pork loin made in a crock pot, seasoned with a spice rub    cup cooked carrots   Supplement Approved protein supplement (if needed between meals)     Breakfast 1 cup breakfast casserole made with egg  substitute, turkey sausage,  and steamed, chopped bell peppers   Supplement  1 cup light Greek yogurt (if needed between meals)   Lunch 2 oz. teriyaki turkey    cup mashed sweet potato with 1-2 spritzes of spray butter (like [place])    cup fresh pineapple   Dinner 3 oz low fat meatloaf    cup roasted garlic zucchini     Breakfast   cup leftover breakfast casserole    cup no sugar added applesauce with 1 Tbsp. unflavored protein powder and a sprinkle of cinnamon    Lunch 3 oz shrimp with 1-2 Tbsp. low-sugar cocktail sauce for dipping    c. whole wheat pasta drizzled with   tsp. olive oil   Supplement 1 cup skim/1% milk with scoop of protein powder (if needed between meals)   Dinner Grilled, seasoned kebob with 2 oz lean beef and   cup vegetables     Breakfast Breakfast pizza:   [place] Thin spread with 1 Tbsp. low sugar spaghetti sauce,   cup shredded low fat cheese, melted and 1 slice of [place] alvarez     cup fresh fruit mixed with chopped almonds   Lunch   cup black bean soup  4-5 whole grain crackers   Dinner 3 oz  tilapia with lemon pepper seasoning    cup stewed tomatoes   Supplement 1 string cheese (if needed between meals)     Breakfast 2 hard boiled eggs (discard 1 egg yolk)    whole wheat English Muffin with 1 tsp. low sugar jelly   Lunch   cup leftover black bean soup topped with 1-2 Tbsp. low fat cheese  2-3 light [place] Krisp crackers   Supplement Approved protein supplement (if needed between meals)   Dinner 3 oz sirloin steak    cup steamed broccoli

## 2021-06-26 NOTE — PROGRESS NOTES
Yuliana Armendariz is a 60 y.o. female who is being evaluated via a billable video visit.       How would you like to obtain your AVS? MyChart.  If dropped from the video visit, the video invitation should be resent by: Text to cell phone: [phone]  Will anyone else be joining your video visit? No     Video Start Time: 12:38 PM      Post-op Surgical Weight Loss Diet Evaluation     Assessment:  Pt presents for 7 years post-op RD visit, s/p RYGB on 4-23-14 with Dr. Sanchez. Today we reviewed current eating habits and level of physical activity, and instructed on the changes that are required for successful bariatric outcomes.    Patient Progress: patient states she has been depressed and under a lot of stress lately- anniversary of family deaths, not working right now-living off workman's comp  +had a EGD  +taking phentermine    Pt's No data recorded    There is no height or weight on file to calculate BMI.    Patient Active Problem List   Diagnosis     Fatigue     GERD (gastroesophageal reflux disease)     Degenerative disc disease     Osteoarthritis     Gout     Asthma     COPD (chronic obstructive pulmonary disease) (H)     Hypertension     Back pain     Metabolic syndrome     Hyperparathyroidism, secondary (H)     Dyslipidemia     Hyperparathyroidism (H)     Low bone mass     Total knee replacement status, left     Partial idiopathic epilepsy with seizures of localized onset, not intractable, without status epilepticus (H)     Hyperlipemia     Diabetes type 2, controlled (H)     Laceration of digital nerve of finger     Hand dysfunction     Acute pain of right shoulder       Diabetes yes- checking blood glucose twice per day    Vitamins   Multi Vit with Iron: yes  Calcium Citrate: yes  B12: yes  D3: yes    Diet Recall/Time: states she doesn't have much of an appetite- will get hungry in the evening- issues with food getting caught despite chewing foods well and slowing down  1/2 slice of bread into sandwich- afraid  "that it will get caught  Boiled egg whites- yolk is too dry, salad goes well, eating fruit all the time  Fish for dinner last night- unable to finish it  +working on getting protein     Estimated portion size per meals: about the size of a saucer plate    Meal Duration:20 minutes    Fluid-meal separation:  Fluids are  30min before and 30 minutes after meals.    Fluid Intake  Water: has been working on increasing water- 4 bottles or more per day    Exercise: PT for neck and shoulder, stretching and using 2lb weights      PES statement:      (NC-1.4) Altered GI Function related to Alteration in gastrointestinal tract structure and/or function/ Decreased functional length of the GI tract as evidenced by Weight loss of 20.6% initial body weight; Gastric bypass surgery    Intervention    Discussion    1. Recommended to consume 15-20gm protein at 3 meals daily, along with protein supplement/\"planned protein containing snack\" of 15-30gm protein, to reach goal of 60-80 gm protein daily.  2. Bariatric Plate Method-  including lean/low fat protein at each meal, including a vegetable/fruit, and limiting carbohydrate intake to less than 25% of plate volume.  3. Motivational interviewing and support for continued success    Assessment/Plan:    Pt to follow up next week with bariatrician     Video-Visit Details    Type of service:  Video Visit    Video End Time (time video stopped): 1:00p  Originating Location (pt. Location): Home    Distant Location (provider location):  [facility] SUR[facility] [place]     Platform used for Video Visit: Michael    "

## 2021-07-06 VITALS — WEIGHT: 185 LBS | HEIGHT: 65 IN | BODY MASS INDEX: 30.82 KG/M2

## 2021-07-06 VITALS
WEIGHT: 217.9 LBS | SYSTOLIC BLOOD PRESSURE: 140 MMHG | DIASTOLIC BLOOD PRESSURE: 80 MMHG | HEART RATE: 86 BPM | OXYGEN SATURATION: 99 % | BODY MASS INDEX: 36.82 KG/M2

## 2021-09-21 DIAGNOSIS — R63.2 HYPERPHAGIA: ICD-10-CM

## 2021-09-21 DIAGNOSIS — E66.811 OBESITY (BMI 30.0-34.9): ICD-10-CM

## 2021-09-23 RX ORDER — PHENTERMINE HYDROCHLORIDE 37.5 MG/1
TABLET ORAL
Qty: 90 TABLET | Refills: 0 | Status: SHIPPED | OUTPATIENT
Start: 2021-09-23 | End: 2022-02-08

## 2021-09-24 ENCOUNTER — TELEPHONE (OUTPATIENT)
Dept: SURGERY | Facility: CLINIC | Age: 61
End: 2021-09-24

## 2021-09-24 NOTE — CONFIDENTIAL NOTE
Called to reschedule visit from this week. Patient had showed up at the clinic for a face to face visit and this provider was not scheduled for in clinic time that day. Left a message encouraging patient to call back to reschedule when able.

## 2021-10-01 ENCOUNTER — VIRTUAL VISIT (OUTPATIENT)
Dept: SURGERY | Facility: CLINIC | Age: 61
End: 2021-10-01
Payer: COMMERCIAL

## 2021-10-01 DIAGNOSIS — Z71.3 NUTRITIONAL COUNSELING: ICD-10-CM

## 2021-10-01 DIAGNOSIS — Z98.84 BARIATRIC SURGERY STATUS: Primary | ICD-10-CM

## 2021-10-01 DIAGNOSIS — E66.811 OBESITY (BMI 30.0-34.9): ICD-10-CM

## 2021-10-01 PROCEDURE — 97803 MED NUTRITION INDIV SUBSEQ: CPT | Mod: TEL | Performed by: DIETITIAN, REGISTERED

## 2021-10-01 NOTE — LETTER
10/1/2021         RE: Yuliana Armendariz  [address]  [place] [facility] [address]        Dear Colleague,    Thank you for referring your patient, Yuliana Armendariz, to the [facility] SURG[facility]ATR[facility] [place]. Please see a copy of my visit note below.    Yuliana Armendariz is a 61 year old who is being evaluated via a billable telephone visit.      What phone number would you like to be contacted at? [phone]  How would you like to obtain your AVS? MyChart      Post-op Surgical Weight Loss Diet Evaluation     Assessment:  Pt presents for 7 years post-op RD visit, s/p RYGB on 4-23-14 with Dr. Sanchez. Today we reviewed current eating habits and level of physical activity, and instructed on the changes that are required for successful bariatric outcomes.    Patient Progress: states her appetite is the same- picking at foods  +getting ready for foot surgery next week, reports high stress  +taking phentermine    No new weight at this visit    BMI: 31.26    Patient Active Problem List   Diagnosis   (none) - all problems resolved or deleted       Vitamins   Multi Vit with Iron: yes  Calcium Citrate: yes  B12: yes  D3: yes    Constipation: occasionally- taking fiber gummies      Diet Recall/Time:   Breakfast: none  Lunch: later- little bit of pasta without sauce, 2 small meatballs  Dinner: salad    Typical Snacks: none      Fluid Intake  Water: 64oz  -juice every once in a while- small can    Exercise: taking dog out- PT once a week      PES statement:      (NC-1.4) Altered GI Function related to Alteration in gastrointestinal tract structure and/or function/ Decreased functional length of the GI tract as evidenced by Gastric bypass surgery  (NB-1.7) Undesirable food choices related to Failure to adjust for lifestyle changes as evidenced by low protein intake at meals; large portions; skipping meals; frequent grazing;  mindless eating ; drinking with meals, not chewing each bite to  "applesauce consistency; consuming caffeine/carbonation daily, frequent restaurant intake; and no structured activity regimen  (NI-5.7.1) Inadequate protein intake related to Gastric bypass causing increased nutrient needs due to malabsorption/ Decreased ability to consume sufficient protein as evidenced by Edema, poor musculature, dull skin, thin and fragile hair; and Estimated intake of protein insufficient to meet requirements    Intervention    Discussion  1. Recommended to consume 15-20gm protein at 3 meals daily, along with protein supplement/\"planned protein containing snack\" of 15-30gm protein, to reach goal of 60-80 gm protein daily.  2. Reviewed lean protein sources  3. Bariatric Plate Method-  including lean/low fat protein at each meal, including a vegetable/fruit, and limiting carbohydrate intake to less than 25% of plate volume.     1. Aim for 60-80g protein per day      Assessment/Plan:    Pt to follow up for annual visit with bariatrician      Phone call duration: 30 minutes    KIRTI REYES RD          Again, thank you for allowing me to participate in the care of your patient.        Sincerely,        KIRTI REYES RD    "

## 2021-10-01 NOTE — PROGRESS NOTES
Yuliana Armendariz is a 61 year old who is being evaluated via a billable telephone visit.      What phone number would you like to be contacted at? [phone]  How would you like to obtain your AVS? Mariel      Post-op Surgical Weight Loss Diet Evaluation     Assessment:  Pt presents for 7 years post-op RD visit, s/p RYGB on 4-23-14 with Dr. Sanchez. Today we reviewed current eating habits and level of physical activity, and instructed on the changes that are required for successful bariatric outcomes.    Patient Progress: states her appetite is the same- picking at foods  +getting ready for foot surgery next week, reports high stress  +taking phentermine    No new weight at this visit    BMI: 31.26    Patient Active Problem List   Diagnosis   (none) - all problems resolved or deleted       Vitamins   Multi Vit with Iron: yes  Calcium Citrate: yes  B12: yes  D3: yes    Constipation: occasionally- taking fiber gummies      Diet Recall/Time:   Breakfast: none  Lunch: later- little bit of pasta without sauce, 2 small meatballs  Dinner: salad    Typical Snacks: none      Fluid Intake  Water: 64oz  -juice every once in a while- small can    Exercise: taking dog out- PT once a week      PES statement:      (NC-1.4) Altered GI Function related to Alteration in gastrointestinal tract structure and/or function/ Decreased functional length of the GI tract as evidenced by Gastric bypass surgery  (NB-1.7) Undesirable food choices related to Failure to adjust for lifestyle changes as evidenced by low protein intake at meals; large portions; skipping meals; frequent grazing;  mindless eating ; drinking with meals, not chewing each bite to applesauce consistency; consuming caffeine/carbonation daily, frequent restaurant intake; and no structured activity regimen  (NI-5.7.1) Inadequate protein intake related to Gastric bypass causing increased nutrient needs due to malabsorption/ Decreased ability to consume sufficient protein as  "evidenced by Edema, poor musculature, dull skin, thin and fragile hair; and Estimated intake of protein insufficient to meet requirements    Intervention    Discussion  1. Recommended to consume 15-20gm protein at 3 meals daily, along with protein supplement/\"planned protein containing snack\" of 15-30gm protein, to reach goal of 60-80 gm protein daily.  2. Reviewed lean protein sources  3. Bariatric Plate Method-  including lean/low fat protein at each meal, including a vegetable/fruit, and limiting carbohydrate intake to less than 25% of plate volume.     1. Aim for 60-80g protein per day      Assessment/Plan:    Pt to follow up for annual visit with bariatrician      Phone call duration: 30 minutes    KIRTI REYES RD      "

## 2022-02-01 ENCOUNTER — TELEPHONE (OUTPATIENT)
Dept: GASTROENTEROLOGY | Facility: CLINIC | Age: 62
End: 2022-02-01
Payer: COMMERCIAL

## 2022-02-01 ENCOUNTER — TRANSCRIBE ORDERS (OUTPATIENT)
Dept: OTHER | Age: 62
End: 2022-02-01
Payer: COMMERCIAL

## 2022-02-01 DIAGNOSIS — K22.4 HYPERCONTRACTILE ESOPHAGUS: Primary | ICD-10-CM

## 2022-02-01 DIAGNOSIS — K22.4 JACKHAMMER ESOPHAGUS: Primary | ICD-10-CM

## 2022-02-01 NOTE — TELEPHONE ENCOUNTER
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 Please review per scheduling guidelines. Thanks!     Patient was informed that she would be contacted within the next 2 business days, but is also requesting a follow up call today for some advice to help with the dennys.     Action Taken: Message routed to:  [facility] GI    Travel Screening: Not Applicable

## 2022-02-02 NOTE — TELEPHONE ENCOUNTER
REFERRAL INFORMATION:    Referring Provider:  Dr. Orlando Nino     Referring Clinic:  [facility]     Reason for Visit/Diagnosis: [place] esophagus- second opinion      FUTURE VISIT INFORMATION:    Appointment Date: 3/29/2022    Appointment Time: 7:40 AM      NOTES STATUS DETAILS   OFFICE NOTE from Referring Provider N/A    OFFICE NOTE from Other Specialist Care Everywhere 2/24/2022, 11/18/2021 Office visit with Dr. Orlando Nino ([facility])     7/6/2021 Office visit with Dr. Ruba Padilla ([facility])    HOSPITAL DISCHARGE SUMMARY/  ED VISITS N/A    OPERATIVE REPORT Care Everywhere Manometry: 10/4/2021 ([facility])    MEDICATION LIST Internal         ENDOSCOPY  Care Everywhere EGD: 5/18/2021 ([facility])    COLONOSCOPY Care Everywhere 8/4/2020 ([facility])    ERCP N/A    EUS N/A    STOOL TESTING N/A    PERTINENT LABS Care Everywhere/ Internal     PATHOLOGY REPORTS (RELATED) Care Everywhere 5/18/2020 ([facility])    IMAGING (CT, MRI, EGD, MRCP, Small Bowel Follow Through/SBT, MR/CT Enterography) N/A

## 2022-02-02 NOTE — TELEPHONE ENCOUNTER
Spoke with pt discussed her diagnosis. Pt request information be sent to her about testing Dr. Lynch is wanting. Scheduled appointment with Dr. Lynch for end of March. Pt will try peppermint oil instead of altoids due to diabetes and not on insulin.

## 2022-02-03 ENCOUNTER — TELEPHONE (OUTPATIENT)
Dept: GASTROENTEROLOGY | Facility: CLINIC | Age: 62
End: 2022-02-03
Payer: COMMERCIAL

## 2022-02-03 DIAGNOSIS — Z11.59 ENCOUNTER FOR SCREENING FOR OTHER VIRAL DISEASES: Primary | ICD-10-CM

## 2022-02-03 NOTE — TELEPHONE ENCOUNTER
"----- Message from Samanta Gonzalez RN sent at 2/3/2022 12:44 PM CST -----  Regarding: Mail letter  Edgar King,    I just created a letter for this pt discussing Dr. Lynch's recommendations and her upcoming appointment with him. Could you print it off and mail it out today? I saved it under [facility] Letter under comments it says \"esophageal letter\". Let me know if you can't find it.    Thank you,  Samanta"

## 2022-02-03 NOTE — PROGRESS NOTES
Yuliana Armendariz is 61 year old  female who presents for a billable video visit today.    How would you like to obtain your AVS? MyChart  If dropped from the video visit, the video invitation should be resent by: Send text at: [phone]    \Will anyone else be joining your video visit? No      Video Start Time: 9:30 am    Are there any specific questions or needs that you would like addressed at your visit today?       Bariatric Follow Up Visit with a History of Previous Bariatric Surgery     Date of visit: 2/4/2022  Physician: Dorothy Coronado MD, MD  Primary Care Provider:  Mayda Mccray  Yuliana Armendariz   61 year old  female    Date of Surgery: 4/23/2014  Initial Weight: 231#  Initial BMI: 38.9  Today's Weight:   Wt Readings from Last 1 Encounters:   02/04/22 86.2 kg (190 lb)     Body mass index is 32.11 kg/m .      Assessment and Plan     Assessment: Yuliana is a 61 year old year old female who is 8 yrs s/p  Jose Daniel en Y Gastric Bypass with Dr. Sanchez  Yuliana Armendariz feels as if she has achieved the goals she hoped to accomplish through bariatric surgery and weight loss.  Unfortunately she has developed esophageal spasm. She is being    Encounter Diagnosis   Name Primary?     Postoperative malabsorption Yes         Current Outpatient Medications:      acetaminophen (TYLENOL) 500 MG tablet, [ACETAMINOPHEN (TYLENOL) 500 MG TABLET] Take 500-1,000 mg by mouth as needed., Disp: , Rfl:      ADVAIR -21 mcg/actuation inhaler, [ADVAIR -21 MCG/ACTUATION INHALER] Inhale 1 puff 2 (two) times a day., Disp: , Rfl: 11     albuterol (PROVENTIL HFA;VENTOLIN HFA) 90 mcg/actuation inhaler, [ALBUTEROL (PROVENTIL HFA;VENTOLIN HFA) 90 MCG/ACTUATION INHALER] Inhale 2 puffs every 6 (six) hours as needed for wheezing., Disp: , Rfl:      albuterol (PROVENTIL) 2.5 mg /3 mL (0.083 %) nebulizer solution, [ALBUTEROL (PROVENTIL) 2.5 MG /3 ML (0.083 %) NEBULIZER SOLUTION] Take 2.5 mg by nebulization every 6 (six) hours  as needed for wheezing., Disp: , Rfl:      amLODIPine (NORVASC) 5 MG tablet, [AMLODIPINE (NORVASC) 5 MG TABLET] Take 2.5 mg by mouth daily. , Disp: , Rfl:      atorvastatin (LIPITOR) 20 MG tablet, [ATORVASTATIN (LIPITOR) 20 MG TABLET] Take 20 mg by mouth bedtime., Disp: , Rfl:      CALCIUM CITRATE/VITAMIN D3 (CITRACAL + D PETITES ORAL), [CALCIUM CITRATE/VITAMIN D3 (CITRACAL + D PETITES ORAL)] Take 2 tablets by mouth 2 (two) times a day. , Disp: , Rfl:      carvedilol (COREG) 3.125 MG tablet, [CARVEDILOL (COREG) 3.125 MG TABLET] Take 3.125 mg by mouth bedtime. , Disp: , Rfl:      celecoxib (CELEBREX) 100 MG capsule, [CELECOXIB (CELEBREX) 100 MG CAPSULE] Take 1 capsule by mouth 2 (two) times a day., Disp: , Rfl:      cholecalciferol (VITAMIN D3) 125 mcg (5000 units) capsule, Take 1 capsule (125 mcg) by mouth 2 times daily, Disp: 180 capsule, Rfl: 3     clotrimazole (LOTRIMIN) 1 % cream, [CLOTRIMAZOLE (LOTRIMIN) 1 % CREAM] Apply 1 application topically 2 (two) times a day., Disp: , Rfl: 1     CONTOUR NEXT TEST STRIPS strips, [CONTOUR NEXT TEST STRIPS STRIPS] daily. TEST ONCE DAILY, Disp: , Rfl:      cyanocobalamin, vitamin B-12, 1,000 mcg Subl, [CYANOCOBALAMIN, VITAMIN B-12, 1,000 MCG SUBL] Place 1 tablet (1,000 mcg total) under the tongue daily., Disp: 90 tablet, Rfl: prn     diltiazem (CARDIZEM) 60 MG tablet, Take 1 tablet by mouth 4 times daily, Disp: , Rfl:      fluocinonide (LIDEX) 0.05 % cream, [FLUOCINONIDE (LIDEX) 0.05 % CREAM] Apply 1 application topically 2 (two) times a day., Disp: , Rfl:      hydrocortisone 2.5 % cream, [HYDROCORTISONE 2.5 % CREAM] Apply 1 application topically as needed., Disp: , Rfl:      levETIRAcetam (KEPPRA) 1000 MG tablet, [LEVETIRACETAM (KEPPRA) 1000 MG TABLET] Take 1 tablet by mouth 2 (two) times a day., Disp: , Rfl: 3     losartan (COZAAR) 50 MG tablet, [LOSARTAN (COZAAR) 50 MG TABLET] Take 50 mg by mouth daily. Two tabs daily, Disp: , Rfl:      miconazole (MICOTIN) 2 % powder,  [MICONAZOLE (MICOTIN) 2 % POWDER] Apply 1 application topically as needed for itching (for rash under breasts)., Disp: , Rfl:      montelukast (SINGULAIR) 10 mg tablet, [MONTELUKAST (SINGULAIR) 10 MG TABLET] Take 10 mg by mouth bedtime., Disp: , Rfl:      omeprazole (PRILOSEC) 20 MG capsule, [OMEPRAZOLE (PRILOSEC) 20 MG CAPSULE] Take 20 mg by mouth 2 (two) times a day. , Disp: , Rfl:      pediatric multivit-iron-min (CEROVITE JR) Chew, [PEDIATRIC MULTIVIT-IRON-MIN (CEROVITE JR) CHEW] Chew 1 tablet 2 (two) times a day., Disp: 180 each, Rfl: 3     phentermine (ADIPEX-P) 37.5 MG tablet, TAKE ONE HALF TO ONE TABLET BY MOUTH DAILY BEFORE BREAKFAST, Disp: 90 tablet, Rfl: 0     QUEtiapine (SEROQUEL) 25 MG tablet, [QUETIAPINE (SEROQUEL) 25 MG TABLET] Take 25 mg by mouth at bedtime., Disp: , Rfl:      SF 5000 PLUS 1.1 % Crea, [SF 5000 PLUS 1.1 % CREA] Apply 1 application topically as needed., Disp: , Rfl: 0     SYMBICORT 80-4.5 MCG/ACT Inhaler, INHALE 2 PUFFS BY MOUTH TWICE DAILY. RINSE MOUTH /. GARGLE AFTER USE, Disp: , Rfl:      traZODone (DESYREL) 50 MG tablet, [TRAZODONE (DESYREL) 50 MG TABLET] at bedtime as needed., Disp: , Rfl:      venlafaxine (EFFEXOR-XR) 150 MG 24 hr capsule, [VENLAFAXINE (EFFEXOR-XR) 150 MG 24 HR CAPSULE] Take 1 capsule by mouth daily., Disp: , Rfl: 11    Plan: Explained that her esophagus spasms are not a common finding after RYGB and that she is in good care and to trust that it will get better. Encouraged her to continue her vitamins with consistency. Listened to her frustrations and asked if she wanted to do her annual bariatric labs and ordered those. Encouraged her to protect her sleep, seek supportive people. She will see the dietitian Lotus today.  Await lab results. Continue phentermine as needed    No follow-ups on file.    Bariatric Surgery Review     Interim History/LifeChanges: Frustrated and very upset because she has esophagus problems (jackhammer esophagus), problems with her  colonoscopy, problems with her shoulder. She is working with Dr. Lynch, taking diltiazem QID which is not working. Follow up EGD with treatment is planned working. Using CPAP. States she is having menopausal symptoms. She is on work restrictions and would like to go on SSDI but she is running in to roadblocks there. Since her visit in June,  she has lost 27# and maintains a 41# weight loss since her RYGB.     Patient Concerns: chest pain d/t esophageal spasm  Appetite (1-10): down  GERD: on TUMS    Medication changes: diltiazem QID-stopped omeprazole    Vitamin Intake:   B-12   SL   MVI  chewable ceravite   Vitamin D  5,000U   Calcium   petites-gummies     Other                LABS: ordered    Nausea no  Vomiting not typically  Constipation manages  Diarrhea no  Rashes no  Hair Loss no  Calf tenderness no  Breathing difficulty no  Reactive Hypoglycemia avoids  Light Headedness    Moods going through menopause    12 point ROS as above and otherwise negative      Habits:  Alcohol: Iris Creme in coffee on occasion  Tobacco: no  Caffeine coffee  NSAIDS no  Exercise Routine: PT and walking  3 meals/day trying  Protein first yes  ?grams/day  Water Separate from meals yes  Calorie Containing Beverages no  Restaurant eating/wk <2  Sleeping interrupted by pain  Stress HIGH!!!  CPAP: yes  Contraception: PM  DEXA:2020 LBM f/u     Social History     Social History     Socioeconomic History     Marital status: Single     Spouse name: Not on file     Number of children: Not on file     Years of education: Not on file     Highest education level: Not on file   Occupational History     Not on file   Tobacco Use     Smoking status: Former Smoker     Smokeless tobacco: Never Used     Tobacco comment: quit over 25 years ago   Substance and Sexual Activity     Alcohol use: Yes     Comment: Alcoholic Drinks/day: Rarely     Drug use: No     Sexual activity: Not Currently     Partners: Male     Birth control/protection: Surgical   Other  Topics Concern     Not on file   Social History Narrative     Not on file     Social Determinants of Health     Financial Resource Strain: Not on file   Food Insecurity: Not on file   Transportation Needs: Not on file   Physical Activity: Not on file   Stress: Not on file   Social Connections: Not on file   Intimate Partner Violence: Not on file   Housing Stability: Not on file       Past Medical History     Past Medical History:   Diagnosis Date     Asthma      Back pain      Congestive heart failure (H)      COPD (chronic obstructive pulmonary disease) (H)      Degenerative disc disease      Diabetes type 2, controlled (H)      Dyslipidemia      GERD (gastroesophageal reflux disease)      Gout      Hyperparathyroidism (H)      Hyperparathyroidism, secondary (H)      Hypertension      Low bone mass      Metabolic syndrome      Morbid obesity (H)      Osteoarthritis      Reflux      Reflux      Problem List     Patient Active Problem List   Diagnosis   (none) - all problems resolved or deleted     Medications       Current Outpatient Medications:      acetaminophen (TYLENOL) 500 MG tablet, [ACETAMINOPHEN (TYLENOL) 500 MG TABLET] Take 500-1,000 mg by mouth as needed., Disp: , Rfl:      ADVAIR -21 mcg/actuation inhaler, [ADVAIR -21 MCG/ACTUATION INHALER] Inhale 1 puff 2 (two) times a day., Disp: , Rfl: 11     albuterol (PROVENTIL HFA;VENTOLIN HFA) 90 mcg/actuation inhaler, [ALBUTEROL (PROVENTIL HFA;VENTOLIN HFA) 90 MCG/ACTUATION INHALER] Inhale 2 puffs every 6 (six) hours as needed for wheezing., Disp: , Rfl:      albuterol (PROVENTIL) 2.5 mg /3 mL (0.083 %) nebulizer solution, [ALBUTEROL (PROVENTIL) 2.5 MG /3 ML (0.083 %) NEBULIZER SOLUTION] Take 2.5 mg by nebulization every 6 (six) hours as needed for wheezing., Disp: , Rfl:      amLODIPine (NORVASC) 5 MG tablet, [AMLODIPINE (NORVASC) 5 MG TABLET] Take 2.5 mg by mouth daily. , Disp: , Rfl:      atorvastatin (LIPITOR) 20 MG tablet, [ATORVASTATIN  (LIPITOR) 20 MG TABLET] Take 20 mg by mouth bedtime., Disp: , Rfl:      CALCIUM CITRATE/VITAMIN D3 (CITRACAL + D PETITES ORAL), [CALCIUM CITRATE/VITAMIN D3 (CITRACAL + D PETITES ORAL)] Take 2 tablets by mouth 2 (two) times a day. , Disp: , Rfl:      carvedilol (COREG) 3.125 MG tablet, [CARVEDILOL (COREG) 3.125 MG TABLET] Take 3.125 mg by mouth bedtime. , Disp: , Rfl:      celecoxib (CELEBREX) 100 MG capsule, [CELECOXIB (CELEBREX) 100 MG CAPSULE] Take 1 capsule by mouth 2 (two) times a day., Disp: , Rfl:      cholecalciferol (VITAMIN D3) 125 mcg (5000 units) capsule, Take 1 capsule (125 mcg) by mouth 2 times daily, Disp: 180 capsule, Rfl: 3     clotrimazole (LOTRIMIN) 1 % cream, [CLOTRIMAZOLE (LOTRIMIN) 1 % CREAM] Apply 1 application topically 2 (two) times a day., Disp: , Rfl: 1     CONTOUR NEXT TEST STRIPS strips, [CONTOUR NEXT TEST STRIPS STRIPS] daily. TEST ONCE DAILY, Disp: , Rfl:      cyanocobalamin, vitamin B-12, 1,000 mcg Subl, [CYANOCOBALAMIN, VITAMIN B-12, 1,000 MCG SUBL] Place 1 tablet (1,000 mcg total) under the tongue daily., Disp: 90 tablet, Rfl: prn     diltiazem (CARDIZEM) 60 MG tablet, Take 1 tablet by mouth 4 times daily, Disp: , Rfl:      fluocinonide (LIDEX) 0.05 % cream, [FLUOCINONIDE (LIDEX) 0.05 % CREAM] Apply 1 application topically 2 (two) times a day., Disp: , Rfl:      hydrocortisone 2.5 % cream, [HYDROCORTISONE 2.5 % CREAM] Apply 1 application topically as needed., Disp: , Rfl:      levETIRAcetam (KEPPRA) 1000 MG tablet, [LEVETIRACETAM (KEPPRA) 1000 MG TABLET] Take 1 tablet by mouth 2 (two) times a day., Disp: , Rfl: 3     losartan (COZAAR) 50 MG tablet, [LOSARTAN (COZAAR) 50 MG TABLET] Take 50 mg by mouth daily. Two tabs daily, Disp: , Rfl:      miconazole (MICOTIN) 2 % powder, [MICONAZOLE (MICOTIN) 2 % POWDER] Apply 1 application topically as needed for itching (for rash under breasts)., Disp: , Rfl:      montelukast (SINGULAIR) 10 mg tablet, [MONTELUKAST (SINGULAIR) 10 MG TABLET] Take  "10 mg by mouth bedtime., Disp: , Rfl:      omeprazole (PRILOSEC) 20 MG capsule, [OMEPRAZOLE (PRILOSEC) 20 MG CAPSULE] Take 20 mg by mouth 2 (two) times a day. , Disp: , Rfl:      pediatric multivit-iron-min (CEROVITE JR) Chew, [PEDIATRIC MULTIVIT-IRON-MIN (CEROVITE JR) CHEW] Chew 1 tablet 2 (two) times a day., Disp: 180 each, Rfl: 3     phentermine (ADIPEX-P) 37.5 MG tablet, TAKE ONE HALF TO ONE TABLET BY MOUTH DAILY BEFORE BREAKFAST, Disp: 90 tablet, Rfl: 0     QUEtiapine (SEROQUEL) 25 MG tablet, [QUETIAPINE (SEROQUEL) 25 MG TABLET] Take 25 mg by mouth at bedtime., Disp: , Rfl:      SF 5000 PLUS 1.1 % Crea, [SF 5000 PLUS 1.1 % CREA] Apply 1 application topically as needed., Disp: , Rfl: 0     SYMBICORT 80-4.5 MCG/ACT Inhaler, INHALE 2 PUFFS BY MOUTH TWICE DAILY. RINSE MOUTH /. GARGLE AFTER USE, Disp: , Rfl:      traZODone (DESYREL) 50 MG tablet, [TRAZODONE (DESYREL) 50 MG TABLET] at bedtime as needed., Disp: , Rfl:      venlafaxine (EFFEXOR-XR) 150 MG 24 hr capsule, [VENLAFAXINE (EFFEXOR-XR) 150 MG 24 HR CAPSULE] Take 1 capsule by mouth daily., Disp: , Rfl: 11   Surgical History     Past Surgical History  She has a past surgical history that includes Revision Jose Daniel-En-Y (4/23/2014); Cholecystectomy; Hysterectomy; tubal ligation; Total Knee Arthroplasty (Left, 03/2019); and Arthroscopy knee with meniscal repair (Left, 01/2019).    Objective-Exam     Constitutional:  Ht 1.638 m (5' 4.5\")   Wt 86.2 kg (190 lb)   BMI 32.11 kg/m    General:  Pleasant and in no acute distress     Psychiatric: alert and oriented X3, mood and affect normal    Counseling     We reviewed the important post op bariatric recommendations:  -eating 3 meals daily  -eating protein first, getting >60gm protein daily  -eating slowly, chewing food well  -avoiding/limiting calorie containing beverages  -drinking water 15-30 minutes before or after meals  -choosing wheat, not white with breads, crackers, pastas, micheal, bagels, tortillas, " rice  -limiting restaurant or cafeteria eating to twice a week or less    We discussed the importance of restorative sleep and stress management in maintaining a healthy weight.  We discussed the National Weight Control Registry healthy weight maintenance strategies and ways to optimize metabolism.  We discussed the importance of physical activity including cardiovascular and strength training in maintaining a healthier weight.    We discussed the importance of life-long vitamin supplementation and life-long  follow-up.    Yuliana was reminded that, to avoid marginal ulcers she should avoid tobacco at all, alcohol in excess, caffeine in excess, and NSAIDS (unless indicated for cardioprotection or othewise and opposed by a PPI).    Dorothy Coronado MD, MD, [facility].  2/4/2022  9:44 AM  Total time spent on the date of this encounter doing: chart review, review of test results, patient visit, physical exam, education, counseling, developing plan of care, and documenting = 30 minutes.      Video-Visit Details    Type of service:  Video Visit    Originating Location (pt. Location): Home    Distant Location (provider location):  [facility] [place]     Platform used for Video Visit: kidthing

## 2022-02-03 NOTE — TELEPHONE ENCOUNTER
Screening Questions  Blue=prep questions Red=location Green=sedation   1. Are you active on mychart? N    2. What insurance is in the chart? [place]     3.  Ordering/Referring Provider: JANET ROUSE    4. BMI 30.8, If greater than 40 review exclusion criteria also will need EXTENDED PREP    5.  Respiratory Screening (If yes to any of the following HOSPITAL setting only):     Do you use daily home oxygen? N  Do you have mod to severe Obstructive Sleep Apnea? Y- USES CPAP (can be seen at [facility] or hospital setting)    Do you have Pulmonary Hypertension? UNCERTAIN   Do you have UNCONTROLLED asthma? N  **HAS COPD & CHF**  6. Have you had a heart or lung transplant? N  (If yes, please review exclusion criteria)    7. Are you currently on dialysis?N  (If yes, schedule in HOSPITAL setting only)(If yes, please send Golytely prep)    8. Do you have chronic kidney disease? N (If yes, please send Golytely prep)    9. Have you had a stroke or Transient ischemic attack (TIA) within 6 months? N (If yes, do not schedule at [facility])    10. In the past 6 months, have you had any heart related issues including cardiomyopathy or heart attack? N (If yes, please review exclusion criteria)           If yes, did it require cardiac stenting or other implantable device?  (If yes, please review exclusion criteria)      11. Do you have any implantable devices in your body (pacemaker, defib, LVAD)? N (If yes, schedule at [facility])    12. Do you take nitroglycerin? If yes, how often? N (if yes, schedule at HOSPITAL setting)    13. Are you currently taking any blood thinners?N (If yes- inform patient to follow up with PCP or provider for follow up instructions)     14. Are you a diabetic? Y (If yes, please send Golytely prep)    15. (Females) Are you currently pregnant?   If yes, how many weeks?      16. Are you taking any prescription pain medications on a routine schedule? N If yes, MAC sedation and patient will need EXTENDED PREP.    17. Do you have any  chemical dependencies such as alcohol, street drugs, or methadone? N If yes, MAC sedation     18. Do you have any history of post-traumatic stress syndrome, severe anxiety or history of psychosis? N  If yes, MAC sedation.     19. Do you transfer independently? Y    20.  Do you have any issues with constipation?    If yes, pt will need EXTENDED PREP     21. Preferred Pharmacy for Pre Prescription     Scheduling Details    Which Colonoscopy Prep was Sent?:   Type of Procedure Scheduled: EGD  Surgeon: NASIM  Date of Procedure: 4/26/22  Location: [facility]  Caller (Please ask for phone number if not scheduled by patient): MIKALA      Sedation Type: MAC  Conscious Sedation- Needs  for 6 hours after the procedure  MAC/General-Needs  for 24 hours after procedure    Pre-op Required at [facility] [place], [place] and OR for MAC sedation: Y  (if yes advise patient they will need a pre-op prior to procedure)      Informed patient they will need an adult  Y  Cannot take any type of public or medical transportation alone    Pre-Procedure Covid test to be completed at [facility] or Externally: 4/22/22 @[facility]    Confirmed Nurse will call to complete assessment Y    Additional comments:  ([facility] PATIENTS NEED EXTENDED PREP)

## 2022-02-04 ENCOUNTER — VIRTUAL VISIT (OUTPATIENT)
Dept: SURGERY | Facility: CLINIC | Age: 62
End: 2022-02-04
Payer: COMMERCIAL

## 2022-02-04 ENCOUNTER — TELEPHONE (OUTPATIENT)
Dept: SURGERY | Facility: CLINIC | Age: 62
End: 2022-02-04

## 2022-02-04 VITALS — HEIGHT: 65 IN | WEIGHT: 190 LBS | BODY MASS INDEX: 31.65 KG/M2

## 2022-02-04 DIAGNOSIS — Z98.84 BARIATRIC SURGERY STATUS: Primary | ICD-10-CM

## 2022-02-04 DIAGNOSIS — E66.811 OBESITY, CLASS I, BMI 30.0-34.9 (SEE ACTUAL BMI): ICD-10-CM

## 2022-02-04 DIAGNOSIS — K91.2 POSTOPERATIVE MALABSORPTION: Primary | ICD-10-CM

## 2022-02-04 PROCEDURE — 97803 MED NUTRITION INDIV SUBSEQ: CPT | Mod: TEL | Performed by: DIETITIAN, REGISTERED

## 2022-02-04 PROCEDURE — 99214 OFFICE O/P EST MOD 30 MIN: CPT | Mod: GT | Performed by: FAMILY MEDICINE

## 2022-02-04 RX ORDER — DILTIAZEM HYDROCHLORIDE 60 MG/1
TABLET, FILM COATED ORAL
COMMUNITY
Start: 2022-01-19

## 2022-02-04 RX ORDER — DILTIAZEM HCL 60 MG
1 TABLET ORAL 4 TIMES DAILY
COMMUNITY
Start: 2022-01-17

## 2022-02-04 ASSESSMENT — MIFFLIN-ST. JEOR: SCORE: 1429.77

## 2022-02-04 NOTE — LETTER
2/4/2022         RE: Yuliana Armendariz  [address]  [place] [address]        Dear Colleague,    Thank you for referring your patient, Yuliana Armendariz, to the [facility] [place]. Please see a copy of my visit note below.    Yuliana Armendariz is a 61 year old who is being evaluated via a billable telephone visit.      What phone number would you like to be contacted at? [phone]  How would you like to obtain your AVS? MyChart      Post-op Surgical Weight Loss Diet Evaluation     Assessment:  Pt presents for 8 years post-op RD visit, s/p RYGB on 4-23-14 with Dr. Sanchez. Today we reviewed current eating habits and level of physical activity, and instructed on the changes that are required for successful bariatric outcomes.    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   +isn't sleeping well, low appetite- feels like food is getting stuck      There is no height or weight on file to calculate BMI.    Patient Active Problem List   Diagnosis   (none) - all problems resolved or deleted       Vitamins   Multi Vit with Iron: yes  Calcium Citrate: yes  B12: yes  D3: yes    Do you experience hunger? no  Do you experience any reflux or discomfort with eating? Yes- jackhammer esophagus  Nausea: yes    Dinner: chili when down ok  +states she has protein shakes in her fridge      Fluid Intake  Water: getting water- focusing on liquids        PES statement:      (NC-1.4) Altered GI Function related to Alteration in gastrointestinal tract structure and/or function/ Decreased functional length of the GI tract as evidenced by Weight loss of 17.75% initial body weight; Gastric bypass surgery      Intervention    Discussion  1. Recommended to consume 15-20gm protein at 3 meals daily, along with protein  "supplement/\"planned protein containing snack\" of 15-30gm protein, to reach goal of 60-80 gm protein daily.  2. Educated on post-op vitamin regimen: Multi Vit + iron 2x/day, calcium citrate 400-600 mg 2x/day, 7937-4761 mcg of Sublingual B-12 daily, and 5000 IU Vitamin D3 daily (MVI and calcium can be taken at the same time BID)  3. Reviewed lean protein sources  4. Bariatric Plate Method-  including lean/low fat protein at each meal, including a vegetable/fruit, and limiting carbohydrate intake to less than 25% of plate volume.       Handouts provided:  Pureed and soft solids materials    Assessment/Plan:    Pt to follow up in 2 months with bariatrician and RD      Phone call duration: 30 minutes    KIRTI REYES RD          Again, thank you for allowing me to participate in the care of your patient.        Sincerely,        KIRTI REYES RD    "

## 2022-02-04 NOTE — PATIENT INSTRUCTIONS
GastricBypass  Pureed Diet Materials    Tips and Hints for Pureed Diet:    Eat 3 meals per day + protein supplement(s). No snacksbetween meals  o Always eat your protein first  o Aim for 50-60 grams of protein per day (you will eventually work up to 60-80 grams per day)    Avoiddrinking during meals, 15-30 minutes before and 30 minutes after    Eat S-L-O-W-L-Y to make each meal last 20-30 minutes. Always stop eating when satisfied    Each meal should be 3-4 Tbsp. of pureed food. Use measuringcups and spoons to be accurate    Continue to have 64 oz sugar free fluids/day  o Minimum 48 oz + protein supplement(s)    Avoid high sugar and high fat foods to prevent dumpingsyndrome   o Check nutrition labels for less than 10 grams of sugar and less than 10 grams of fat per serving    Continue to avoid caffeine, carbonation and alcohol    Use saucerplates/baby spoons/pickle forks, to help you take small bites    How to Puree (Blend) Foods:  o Moist, cooked meat/poultry/fish or other lean protein works best to blenderize  o Cut food into smallpieces  o Place food into  or   o If needed, blend with liquids such as :    Low sugar spaghetti sauce (like Hunt's No Sugar Added)    Salsa (without beans/corn)    Taco Sauce    BBQ sauce (like [place] 2 Carb)    Water     Broth    Low fat gravy    Low fat or skim milk  o Blend until smooth with no chunks or lumps present  o Strain foods that do not blendcompletely to avoid lumps/chunks    Time Saving Tip: After cooking/blending batch of food, place in small containers or ice cube trays to refrigerate/freeze for next meal  VITAMINS & MINERALS:  Continue taking:  o 1 Multivitamin with Iron twice daily (chewable, liquid or crushed)  o 0881-2959 mcg of Sublingual B-12 daily        Pureed Diet Grocery List  ? 3 Tablespoons should be protein food  Blended hardboiledegg/tuna/chicken/salmon/crab with light mayonnaise   Blended lean ground turkey with low-sugar spaghetti  sauce   Blended lean meats (chicken, turkey, pork, beef, fish)   May blend with low fat gravy, low sugar BBQ sauce, salsa, taco sauce etc.   Low fat/fat free cottage cheese, part skim ricotta cheese (do not need to blend)   Fat free lightGreek/regular yogurt (do not need to blend)   Low fat/fat free refried beans (without chunks or skins)   Blended chili (using lean ground beef or turkey. NO BEANS)   [employer] Turkey Chili (without beans) works well   Blended tofu , vegan crumbles or veggie burger   Hummus   ? Tip: add spices to enhance taste: oregano/Italian seasoning into spaghetti sauce, garlic/onion powder, any Mrs. Dash seasonings, etc.  ? 1 Tablespoon can be fruit,vegetable or starch  ? May add 1-2 tsp. unflavored protein powder to any of these to boost protein intake  Vegetables:    Blended cooked vegetables such as squash, carrots, peeled zucchini    Stage 1 Baby Food Vegetables   ? AVOID: anyskins/peels/membranes such as celery, tomatoes, corn, peas, string beans  ? Vegetables such as broccoli/cabbage/cauliflower can increase gas production, use caution   Fruit:    Blended, peeled fresh or canned fruit (packed in own juice) such as: cantaloupe, honeydew, applesauce, peaches, pears, mandarin oranges, bananas    Stage 1 Baby FoodFruit   ? AVOID: any skins/peels/membranes/seeds such as: oranges, grapes, berries, apple with peel   Starches:    Mashed potatoes, mashed sweet potatoes (without skins)   Cooked cereal such as Cream of Wheat/Rice, Plain Grits, Malt-o-Meal (unflavored)   ? Tip: Small amount of fats (use sparingly) such as: soft tub margarine, spray margarine, olive/canola oil may be added to blended food to add flavor        ? Protein Supplements: should provide 30-40 grams of protein daily (in addition to 3 meals)      cup of protein food = 7 grams protein  o x 3meals per day = 21 grams of protein from meals  o GOAL: 50-60 grams protein per day  o 50-21 = 29 grams of protein needed from protein  supplement  o 60-21 = 39 grams of protein needed from protein supplement    Depending on what protein supplement you use, this means you will need to drink 1-2 shakes per day. Remember to have this separate from meals, as it is a liquid.      See Protein SupplementList (in binder) for acceptable Protein Shakes      Pureed Diet Sample Menu Plan  You do NOT need to eat/drink the full portion sizes listed below  Always stop when you aresatisfied  Monday  Breakfast 3-4 Tbsp. of plain non-fat Greek yogurt with sugar free jelly (to add flavor), add 1-2 tsp. unflavored protein powder   Lunch 3-4 Tbsp. of blended lean hamburger with low sugar spaghetti sauce    Dinner 3 Tbsp.of blended turkey with low fat gravy  1 Tbsp. of mashed potatoes, add 1-2 tsp. unflavored protein powder   Supplement Approved Protein Shakes  (Have between meals throughout the day)       Breakfast 3 Tbsp. of blended lean ham  1 Tbsp. of mashed banana   Lunch 3-4 Tbsp. blended [place] mixed with 1-2 tsp. of light Mayonnaise   Dinner   cup of blended chicken soup(made with 3 Tbsp. of blended chicken, 1 Tbsp. of blended carrots, and chicken broth to make a thick soup consistency)   Supplement Approved Protein Shakes   (Have between meals throughout the day)     Wednesday  Breakfast 3 Tbsp. of cottage cheese  1 Tbsp. of canned pineapple in own juice (blended)   Lunch 3-4 Tbsp. of canned chicken mixed with 1-2 tsp. of light álvarez (blended)   Dinner 3 Tbsp. of blended fish  1 Tbsp. of blended carrots, add 1-2 tsp. unflavored protein powder   Supplement Approved Protein Shakes   (Have between meals throughout the day)     Thursday  Breakfast  3-4 Tbsp. ofblended hardboiled egg mixed with 1-2 tsp. light álvarez  Lunch   3-4 Tbsp. of blended lentil soup  Dinner  3 Tbsp. low-fat blended crock pot chicken  1 Tbsp. blended squash, add 1-2 tsp. unflavored protein powder  Supplement   Protein Shakes   (Have between meals throughout the day)  Friday  Breakfast 3-4  Tbsp.  blended lean ham or turkey sausage   Lunch 1 Tbsp. blended turkey  2 Tbsp. cottage cheese  1 Tbsp. blendedpeaches, add 1-2 tsp. unflavored protein powder   Dinner 3 Tbsp. blended low-fat meat loaf (without bread crumbs)  1 Tbsp. blended cooked carrots, add 1-2 tsp. unflavored protein powder     Saturday     Breakfast     3 Tbsp. of blended hardboiled egg mixed with 1-2 teaspoons light álvarez     Lunch     3 Tbsp. of blended turkey      1 Tbsp. of mashed sweet potatoes, add 1-2 tsp. unflavored protein powder     Dinner     3-4 Tbsp. of blended chili (without beans)     Supplement     Approved Protein Shakes      (Have between meals throughout the day)          Sunday     Breakfast     3-4 Tbsp. of plain non-fat Greek yogurt with sugar free jelly (to add flavor)      Lunch      3 Tbsp. lean ground turkey blended with taco sauce      1 Tbsp. fat-free refried beans      Dinner     3 Tbsp. lean ground beef or turkey blended with low-sugar spaghetti sauce     1 Tbsp. blended green beans, add 1-2 tsp. unflavored protein powder     Supplement     Approved Protein Shakes      (Have between meals throughout the day)       Gastric Bypass  Soft/Regular   Diet Materials      Tips and Hints for Soft/Regular Diet:    Eat 3 meals per day + protein supplement(s). No snacks between meals.  o Always eat your protein first  o Aim for 50-60 grams of protein per day (as your portions become larger with time, you will eventually work up to 60-80 grams per day)    Avoid drinking during meals, 15-30 minutes before and 30 minutes after    When beginning with soft solid foods, start slowly by adding one new food at a time    Start by taking one small bite.  Chew until itis applesauce consistency and swallow Wait 2 minutes and if there are no problems, take another small bite, etc.     Eat S-L-O-W-L-Y to make each meal last 20-30 minutes. Always stop eating when satisfied    Each mealshould be 3-4 Tbsp. of food. Use measuring cups and spoons to  be accurate    Continue to have 64 oz sugar free fluids/day  o Minimum 48 oz + protein supplement(s)    Avoid highsugar and high fat foods to prevent dumping syndrome  o Check nutrition labels for less than 10 grams of sugar and less than 10 grams of fat per serving    Use saucer plates/babyspoons/pickle forks, to help you take small bites    Continue to avoid carbonation, caffeine, and alcohol    If having trouble tolerating meat, try using a crock-pot, tinfoil tent, or other moist cooking method to createtender meats. Moistness is important    Until 3 Months Post-Op:  o Avoid foods with skins, peels, seeds, membranes (such as oranges, apples, corn, cucumber, etc.). These may become lodgedin outlet of the pouch  o Avoid oatmeal, bread, rice, pasta, noodles, and white crackers (Saltines, Ritz, etc.)  VITAMINS & MINERALS:    Begin Taking:  o 500-600 mg Calcium Citrate twice daily (chewable, liquid or crushed)  o 5000 IU Vitamin D3 daily    Continue taking:  o 1 Multivitamin with Iron twice daily (chewable, liquid or crushed)  o 8866-3248 mcg of Sublingual B-12 daily        Soft/Regular Diet Grocery List  Maximum portion per meal: 4 Tablespoons =   cup    ? 3 Tablespoons should be protein food  Egg or Egg Substitute (use caution, scrambled eggs can cause gas pains)   Canned tuna, chicken, salmon, crab or boiled egg, made with 1-2 teaspoons of light mayonnaise (use fat sparingly)   Moist ground meats such as lean ground turkey, ground round, ground sirloin   Lean, tender cuts of     poultry such as boneless, skinless chickenor turkey breast     pork such as pork loin or tenderloin     beef such as Choice or Select grades of sirloin, round, top loin, round tip, flank   Baked, unbreaded fish fillet or steak (salmon,tilapia, walleye, tuna, etc.)   Low fat/fat free cottage cheese, part skim ricotta cheese    Fat free light Greek/regular yogurt    Low fat/fat freerefried beans (without skins)   Chili (using lean ground  turkey or beef. NO BEANS)   Low-fat meatloaf or turkey loaf    Low-fat cheese slices orstring cheese (start with   stick)   Soy or vegetable protein-based meat and cheeses (vegan crumbles, soy/veggie burger)   Tofu   Hummus      ? 1 Tablespoon can be fruit, vegetable or starch  Vegetables:  Cooked vegetables (such as: squash, carrots, peeledzucchini, green beans, asparagus)   AVOID: any skins/peels/membranes/seeds such as celery, tomatoes, corn, peas, string beans   Vegetables such asbroccoli/cabbage/cauliflower can increase gas production, use caution    Fruit:  Peeled fresh fruit (such as: Bananas, apples, cantaloupe, peaches, pears, etc. )   Canned fruit packed in own juice (such as: Mandarin oranges, peaches, pears)   AVOID: any skins/peels/membranes/seeds such as: oranges, grapes, berries, apple with peel    Starches:  Unsweetened whole grain hot cereal (Cream of Wheat/Rice, Plain Grits, Malt-o-meal (unflavored)   AVOID: oatmeal, seeds, nuts   100%whole wheat crackers (ex: All Bran, Wheat Thin Fiber Selects)   Limit crackers to 2-3 per meal. Not meant to be eaten as a snack between meals   Baked, boiled or mashed potato or sweetpotato (without skin)     ? Tip: Small amount of fats (use sparingly) such as: soft tub margarine, spray margarine, olive/canola oil may be added toblended food to add flavor  Soft/Regular Diet Sample Menu    You do NOT need to eat/drink the full portion sizes listed below  Always stop when you are satisfied  Monday     Breakfast 3-4 Tbsp. 1% cottage cheese   Lunch 3 Tbsp. flaked fish  1 Tbsp. green beans   Dinner 3 Tbsp. moist chicken breast made in the crock pot   1 Tbsp. canned peaches in light juice    Approved Protein Shake   (Have between meals throughout the day)     Tuesday  Breakfast 1 poached egg   Lunch 3 Tbsp. tuna salad(mixed with 1-2 tsp. light álvarez)  2 wheat thins   Dinner 3 Tbsp. grilled, broiled, or baked lemon pepper salmon  1 Tbsp. asparagus   Supplement Approved  Protein Shake   (Havebetween meals throughout the day)     Wednesday  Breakfast 3-4  Tbsp. low-fat turkey sausage   Lunch   cup of chili without beans   Dinner 3 Tbsp. porkloin made in a crock pot for added moisture  1 Tbsp. cooked carrots   Supplement Approved Protein Shake   (Have between meals throughout the day)     Thursday  Breakfast   cup plain fat free Greek yogurt with sugar free jelly (to add flavor) or Dannon Light and Fit Greek yogurt or [employer]oplait Greek 100 yogurt    Lunch  3 Tbsp. diced turkey or ham  1 Tbsp. sweet potato(without skin)   Dinner 3 Tbsp. low-fat meatloaf or turkey loaf   1 Tbsp. pears    Supplement Approved Protein Shake   (Have between meals throughout the day)     Friday  Breakfast    -1 stick of mozzarella string cheese  Lunch  3 Tbsp. refried beans  1 Tbsp. salsa  Dinner  3 Tbsp. tilapia   1 Tbsp. green beans  Supplement   Protein Shake   (Have between meals throughout the day)  Saturday  Breakfast 1 hard boiled egg   Lunch 3-4 Tbsp. crab meat or imitation crab mixed with 1-2 tsp. of light álvarez   Dinner 3 Tbsp. sirloin steak  1 Tbsp. mashed potato    Supplement Approved Protein Shake   (Have between meals throughout the day)

## 2022-02-04 NOTE — TELEPHONE ENCOUNTER
Sent video link via text and attempted to call for video visit at 10:00a. Left a detailed message encouraging patient to call to reschedule when able.     Patient had visit with Dr. Coronado at 9:30a, so was encouraged to call back to see if she could be scheduled for one of my visits later in the day.

## 2022-02-04 NOTE — PROGRESS NOTES
"Yuliana Armendariz is a 61 year old who is being evaluated via a billable telephone visit.      What phone number would you like to be contacted at? [phone]  How would you like to obtain your AVS? 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 Sanchez. Today we reviewed current eating habits and level of physical activity, and instructed on the changes that are required for successful bariatric outcomes.    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   +isn't sleeping well, low appetite- feels like food is getting stuck      There is no height or weight on file to calculate BMI.    Patient Active Problem List   Diagnosis   (none) - all problems resolved or deleted       Vitamins   Multi Vit with Iron: yes  Calcium Citrate: yes  B12: yes  D3: yes    Do you experience hunger? no  Do you experience any reflux or discomfort with eating? Yes- jackhammer esophagus  Nausea: yes    Dinner: chili when down ok  +states she has protein shakes in her fridge      Fluid Intake  Water: getting water- focusing on liquids        PES statement:      (NC-1.4) Altered GI Function related to Alteration in gastrointestinal tract structure and/or function/ Decreased functional length of the GI tract as evidenced by Weight loss of 17.75% initial body weight; Gastric bypass surgery      Intervention    Discussion  1. Recommended to consume 15-20gm protein at 3 meals daily, along with protein supplement/\"planned protein containing snack\" of 15-30gm protein, to reach goal of 60-80 gm protein daily.  2. Educated on post-op vitamin regimen: Multi Vit + iron 2x/day, calcium citrate 400-600 mg 2x/day, 4488-7400 mcg of Sublingual B-12 daily, and 5000 IU Vitamin D3 daily (MVI and calcium can be " taken at the same time BID)  3. Reviewed lean protein sources  4. Bariatric Plate Method-  including lean/low fat protein at each meal, including a vegetable/fruit, and limiting carbohydrate intake to less than 25% of plate volume.       Handouts provided:  Pureed and soft solids materials    Assessment/Plan:    Pt to follow up in 2 months with bariatrician and RD      Phone call duration: 30 minutes    KIRTI REYES RD

## 2022-02-04 NOTE — LETTER
2/4/2022         RE: Yuliana Armendariz  [address]  [facility] [address]        Dear Colleague,    Thank you for referring your patient, Yuliana Armendariz, to the [facility] [place]. Please see a copy of my visit note below.    Yuliana Armendariz is 61 year old  female who presents for a billable video visit today.    How would you like to obtain your AVS? MyChart  If dropped from the video visit, the video invitation should be resent by: Send text at: [phone]    \Will anyone else be joining your video visit? No      Video Start Time: 9:30 am    Are there any specific questions or needs that you would like addressed at your visit today?       Bariatric Follow Up Visit with a History of Previous Bariatric Surgery     Date of visit: 2/4/2022  Physician: Dorothy Coronado MD, MD  Primary Care Provider:  Mayda Mccray  Yuliana Armendariz   61 year old  female    Date of Surgery: 4/23/2014  Initial Weight: 231#  Initial BMI: 38.9  Today's Weight:   Wt Readings from Last 1 Encounters:   02/04/22 86.2 kg (190 lb)     Body mass index is 32.11 kg/m .      Assessment and Plan     Assessment: Yuliana is a 61 year old year old female who is 8 yrs s/p  Jose Daniel en Y Gastric Bypass with Dr. Sanchez  Yuliana Armendariz feels as if she has achieved the goals she hoped to accomplish through bariatric surgery and weight loss.  Unfortunately she has developed esophageal spasm. She is being    Encounter Diagnosis   Name Primary?     Postoperative malabsorption Yes         Current Outpatient Medications:      acetaminophen (TYLENOL) 500 MG tablet, [ACETAMINOPHEN (TYLENOL) 500 MG TABLET] Take 500-1,000 mg by mouth as needed., Disp: , Rfl:      ADVAIR -21 mcg/actuation inhaler, [ADVAIR -21 MCG/ACTUATION INHALER] Inhale 1 puff 2 (two) times a day., Disp: , Rfl: 11     albuterol (PROVENTIL HFA;VENTOLIN HFA) 90 mcg/actuation inhaler, [ALBUTEROL (PROVENTIL  HFA;VENTOLIN HFA) 90 MCG/ACTUATION INHALER] Inhale 2 puffs every 6 (six) hours as needed for wheezing., Disp: , Rfl:      albuterol (PROVENTIL) 2.5 mg /3 mL (0.083 %) nebulizer solution, [ALBUTEROL (PROVENTIL) 2.5 MG /3 ML (0.083 %) NEBULIZER SOLUTION] Take 2.5 mg by nebulization every 6 (six) hours as needed for wheezing., Disp: , Rfl:      amLODIPine (NORVASC) 5 MG tablet, [AMLODIPINE (NORVASC) 5 MG TABLET] Take 2.5 mg by mouth daily. , Disp: , Rfl:      atorvastatin (LIPITOR) 20 MG tablet, [ATORVASTATIN (LIPITOR) 20 MG TABLET] Take 20 mg by mouth bedtime., Disp: , Rfl:      CALCIUM CITRATE/VITAMIN D3 (CITRACAL + D PETITES ORAL), [CALCIUM CITRATE/VITAMIN D3 (CITRACAL + D PETITES ORAL)] Take 2 tablets by mouth 2 (two) times a day. , Disp: , Rfl:      carvedilol (COREG) 3.125 MG tablet, [CARVEDILOL (COREG) 3.125 MG TABLET] Take 3.125 mg by mouth bedtime. , Disp: , Rfl:      celecoxib (CELEBREX) 100 MG capsule, [CELECOXIB (CELEBREX) 100 MG CAPSULE] Take 1 capsule by mouth 2 (two) times a day., Disp: , Rfl:      cholecalciferol (VITAMIN D3) 125 mcg (5000 units) capsule, Take 1 capsule (125 mcg) by mouth 2 times daily, Disp: 180 capsule, Rfl: 3     clotrimazole (LOTRIMIN) 1 % cream, [CLOTRIMAZOLE (LOTRIMIN) 1 % CREAM] Apply 1 application topically 2 (two) times a day., Disp: , Rfl: 1     CONTOUR NEXT TEST STRIPS strips, [CONTOUR NEXT TEST STRIPS STRIPS] daily. TEST ONCE DAILY, Disp: , Rfl:      cyanocobalamin, vitamin B-12, 1,000 mcg Subl, [CYANOCOBALAMIN, VITAMIN B-12, 1,000 MCG SUBL] Place 1 tablet (1,000 mcg total) under the tongue daily., Disp: 90 tablet, Rfl: prn     diltiazem (CARDIZEM) 60 MG tablet, Take 1 tablet by mouth 4 times daily, Disp: , Rfl:      fluocinonide (LIDEX) 0.05 % cream, [FLUOCINONIDE (LIDEX) 0.05 % CREAM] Apply 1 application topically 2 (two) times a day., Disp: , Rfl:      hydrocortisone 2.5 % cream, [HYDROCORTISONE 2.5 % CREAM] Apply 1 application topically as needed., Disp: , Rfl:       levETIRAcetam (KEPPRA) 1000 MG tablet, [LEVETIRACETAM (KEPPRA) 1000 MG TABLET] Take 1 tablet by mouth 2 (two) times a day., Disp: , Rfl: 3     losartan (COZAAR) 50 MG tablet, [LOSARTAN (COZAAR) 50 MG TABLET] Take 50 mg by mouth daily. Two tabs daily, Disp: , Rfl:      miconazole (MICOTIN) 2 % powder, [MICONAZOLE (MICOTIN) 2 % POWDER] Apply 1 application topically as needed for itching (for rash under breasts)., Disp: , Rfl:      montelukast (SINGULAIR) 10 mg tablet, [MONTELUKAST (SINGULAIR) 10 MG TABLET] Take 10 mg by mouth bedtime., Disp: , Rfl:      omeprazole (PRILOSEC) 20 MG capsule, [OMEPRAZOLE (PRILOSEC) 20 MG CAPSULE] Take 20 mg by mouth 2 (two) times a day. , Disp: , Rfl:      pediatric multivit-iron-min (CEROVITE JR) Chew, [PEDIATRIC MULTIVIT-IRON-MIN (CEROVITE JR) CHEW] Chew 1 tablet 2 (two) times a day., Disp: 180 each, Rfl: 3     phentermine (ADIPEX-P) 37.5 MG tablet, TAKE ONE HALF TO ONE TABLET BY MOUTH DAILY BEFORE BREAKFAST, Disp: 90 tablet, Rfl: 0     QUEtiapine (SEROQUEL) 25 MG tablet, [QUETIAPINE (SEROQUEL) 25 MG TABLET] Take 25 mg by mouth at bedtime., Disp: , Rfl:      SF 5000 PLUS 1.1 % Crea, [SF 5000 PLUS 1.1 % CREA] Apply 1 application topically as needed., Disp: , Rfl: 0     SYMBICORT 80-4.5 MCG/ACT Inhaler, INHALE 2 PUFFS BY MOUTH TWICE DAILY. RINSE MOUTH /. GARGLE AFTER USE, Disp: , Rfl:      traZODone (DESYREL) 50 MG tablet, [TRAZODONE (DESYREL) 50 MG TABLET] at bedtime as needed., Disp: , Rfl:      venlafaxine (EFFEXOR-XR) 150 MG 24 hr capsule, [VENLAFAXINE (EFFEXOR-XR) 150 MG 24 HR CAPSULE] Take 1 capsule by mouth daily., Disp: , Rfl: 11    Plan: Explained that her esophagus spasms are not a common finding after RYGB and that she is in good care and to trust that it will get better. Encouraged her to continue her vitamins with consistency. Listened to her frustrations and asked if she wanted to do her annual bariatric labs and ordered those. Encouraged her to protect her sleep, seek  supportive people. She will see the dietitian Lotus today.  Await lab results. Continue phentermine as needed    No follow-ups on file.    Bariatric Surgery Review     Interim History/LifeChanges: Frustrated and very upset because she has esophagus problems (jackhammer esophagus), problems with her colonoscopy, problems with her shoulder. She is working with Dr. Lynch, taking diltiazem QID which is not working. Follow up EGD with treatment is planned working. Using CPAP. States she is having menopausal symptoms. She is on work restrictions and would like to go on SSDI but she is running in to [employer] there. Since her visit in June,  she has lost 27# and maintains a 41# weight loss since her RYGB.     Patient Concerns: chest pain d/t esophageal spasm  Appetite (1-10): down  GERD: on TUMS    Medication changes: diltiazem QID-stopped omeprazole    Vitamin Intake:   B-12   SL   MVI  chewable ceravite   Vitamin D  5,000U   Calcium   petites-gummies     Other                LABS: ordered    Nausea no  Vomiting not typically  Constipation manages  Diarrhea no  Rashes no  Hair Loss no  Calf tenderness no  Breathing difficulty no  Reactive Hypoglycemia avoids  Light Headedness    Moods going through menopause    12 point ROS as above and otherwise negative      Habits:  Alcohol: Iris [employer]reme in coffee on occasion  Tobacco: no  Caffeine coffee  NSAIDS no  Exercise Routine: PT and walking  3 meals/day trying  Protein first yes  ?grams/day  Water Separate from meals yes  Calorie Containing Beverages no  Restaurant eating/wk <2  Sleeping interrupted by pain  Stress HIGH!!!  CPAP: yes  Contraception: PM  DEXA:2020 LBM f/u     Social History     Social History     Socioeconomic History     Marital status: Single     Spouse name: Not on file     Number of children: Not on file     Years of education: Not on file     Highest education level: Not on file   Occupational History     Not on file   Tobacco Use     Smoking status:  Former Smoker     Smokeless tobacco: Never Used     Tobacco comment: quit over 25 years ago   Substance and Sexual Activity     Alcohol use: Yes     Comment: Alcoholic Drinks/day: Rarely     Drug use: No     Sexual activity: Not Currently     Partners: Male     Birth control/protection: Surgical   Other Topics Concern     Not on file   Social History Narrative     Not on file     Social Determinants of Health     Financial Resource Strain: Not on file   Food Insecurity: Not on file   Transportation Needs: Not on file   Physical Activity: Not on file   Stress: Not on file   Social Connections: Not on file   Intimate Partner Violence: Not on file   Housing Stability: Not on file       Past Medical History     Past Medical History:   Diagnosis Date     Asthma      Back pain      Congestive heart failure (H)      COPD (chronic obstructive pulmonary disease) (H)      Degenerative disc disease      Diabetes type 2, controlled (H)      Dyslipidemia      GERD (gastroesophageal reflux disease)      Gout      Hyperparathyroidism (H)      Hyperparathyroidism, secondary (H)      Hypertension      Low bone mass      Metabolic syndrome      Morbid obesity (H)      Osteoarthritis      Reflux      Reflux      Problem List     Patient Active Problem List   Diagnosis   (none) - all problems resolved or deleted     Medications       Current Outpatient Medications:      acetaminophen (TYLENOL) 500 MG tablet, [ACETAMINOPHEN (TYLENOL) 500 MG TABLET] Take 500-1,000 mg by mouth as needed., Disp: , Rfl:      ADVAIR -21 mcg/actuation inhaler, [ADVAIR -21 MCG/ACTUATION INHALER] Inhale 1 puff 2 (two) times a day., Disp: , Rfl: 11     albuterol (PROVENTIL HFA;VENTOLIN HFA) 90 mcg/actuation inhaler, [ALBUTEROL (PROVENTIL HFA;VENTOLIN HFA) 90 MCG/ACTUATION INHALER] Inhale 2 puffs every 6 (six) hours as needed for wheezing., Disp: , Rfl:      albuterol (PROVENTIL) 2.5 mg /3 mL (0.083 %) nebulizer solution, [ALBUTEROL (PROVENTIL) 2.5  MG /3 ML (0.083 %) NEBULIZER SOLUTION] Take 2.5 mg by nebulization every 6 (six) hours as needed for wheezing., Disp: , Rfl:      amLODIPine (NORVASC) 5 MG tablet, [AMLODIPINE (NORVASC) 5 MG TABLET] Take 2.5 mg by mouth daily. , Disp: , Rfl:      atorvastatin (LIPITOR) 20 MG tablet, [ATORVASTATIN (LIPITOR) 20 MG TABLET] Take 20 mg by mouth bedtime., Disp: , Rfl:      CALCIUM CITRATE/VITAMIN D3 (CITRACAL + D PETITES ORAL), [CALCIUM CITRATE/VITAMIN D3 (CITRACAL + D PETITES ORAL)] Take 2 tablets by mouth 2 (two) times a day. , Disp: , Rfl:      carvedilol (COREG) 3.125 MG tablet, [CARVEDILOL (COREG) 3.125 MG TABLET] Take 3.125 mg by mouth bedtime. , Disp: , Rfl:      celecoxib (CELEBREX) 100 MG capsule, [CELECOXIB (CELEBREX) 100 MG CAPSULE] Take 1 capsule by mouth 2 (two) times a day., Disp: , Rfl:      cholecalciferol (VITAMIN D3) 125 mcg (5000 units) capsule, Take 1 capsule (125 mcg) by mouth 2 times daily, Disp: 180 capsule, Rfl: 3     clotrimazole (LOTRIMIN) 1 % cream, [CLOTRIMAZOLE (LOTRIMIN) 1 % CREAM] Apply 1 application topically 2 (two) times a day., Disp: , Rfl: 1     CONTOUR NEXT TEST STRIPS strips, [CONTOUR NEXT TEST STRIPS STRIPS] daily. TEST ONCE DAILY, Disp: , Rfl:      cyanocobalamin, vitamin B-12, 1,000 mcg Subl, [CYANOCOBALAMIN, VITAMIN B-12, 1,000 MCG SUBL] Place 1 tablet (1,000 mcg total) under the tongue daily., Disp: 90 tablet, Rfl: prn     diltiazem (CARDIZEM) 60 MG tablet, Take 1 tablet by mouth 4 times daily, Disp: , Rfl:      fluocinonide (LIDEX) 0.05 % cream, [FLUOCINONIDE (LIDEX) 0.05 % CREAM] Apply 1 application topically 2 (two) times a day., Disp: , Rfl:      hydrocortisone 2.5 % cream, [HYDROCORTISONE 2.5 % CREAM] Apply 1 application topically as needed., Disp: , Rfl:      levETIRAcetam (KEPPRA) 1000 MG tablet, [LEVETIRACETAM (KEPPRA) 1000 MG TABLET] Take 1 tablet by mouth 2 (two) times a day., Disp: , Rfl: 3     losartan (COZAAR) 50 MG tablet, [LOSARTAN (COZAAR) 50 MG TABLET] Take 50 mg  "by mouth daily. Two tabs daily, Disp: , Rfl:      miconazole (MICOTIN) 2 % powder, [MICONAZOLE (MICOTIN) 2 % POWDER] Apply 1 application topically as needed for itching (for rash under breasts)., Disp: , Rfl:      montelukast (SINGULAIR) 10 mg tablet, [MONTELUKAST (SINGULAIR) 10 MG TABLET] Take 10 mg by mouth bedtime., Disp: , Rfl:      omeprazole (PRILOSEC) 20 MG capsule, [OMEPRAZOLE (PRILOSEC) 20 MG CAPSULE] Take 20 mg by mouth 2 (two) times a day. , Disp: , Rfl:      pediatric multivit-iron-min (CEROVITE JR) Chew, [PEDIATRIC MULTIVIT-IRON-MIN (CEROVITE JR) CHEW] Chew 1 tablet 2 (two) times a day., Disp: 180 each, Rfl: 3     phentermine (ADIPEX-P) 37.5 MG tablet, TAKE ONE HALF TO ONE TABLET BY MOUTH DAILY BEFORE BREAKFAST, Disp: 90 tablet, Rfl: 0     QUEtiapine (SEROQUEL) 25 MG tablet, [QUETIAPINE (SEROQUEL) 25 MG TABLET] Take 25 mg by mouth at bedtime., Disp: , Rfl:      SF 5000 PLUS 1.1 % Crea, [SF 5000 PLUS 1.1 % CREA] Apply 1 application topically as needed., Disp: , Rfl: 0     SYMBICORT 80-4.5 MCG/ACT Inhaler, INHALE 2 PUFFS BY MOUTH TWICE DAILY. RINSE MOUTH /. GARGLE AFTER USE, Disp: , Rfl:      traZODone (DESYREL) 50 MG tablet, [TRAZODONE (DESYREL) 50 MG TABLET] at bedtime as needed., Disp: , Rfl:      venlafaxine (EFFEXOR-XR) 150 MG 24 hr capsule, [VENLAFAXINE (EFFEXOR-XR) 150 MG 24 HR CAPSULE] Take 1 capsule by mouth daily., Disp: , Rfl: 11   Surgical History     Past Surgical History  She has a past surgical history that includes Revision Jose Daniel-En-Y (4/23/2014); Cholecystectomy; Hysterectomy; tubal ligation; Total Knee Arthroplasty (Left, 03/2019); and Arthroscopy knee with meniscal repair (Left, 01/2019).    Objective-Exam     Constitutional:  Ht 1.638 m (5' 4.5\")   Wt 86.2 kg (190 lb)   BMI 32.11 kg/m    General:  Pleasant and in no acute distress     Psychiatric: alert and oriented X3, mood and affect normal    Counseling     We reviewed the important post op bariatric recommendations:  -eating 3 " meals daily  -eating protein first, getting >60gm protein daily  -eating slowly, chewing food well  -avoiding/limiting calorie containing beverages  -drinking water 15-30 minutes before or after meals  -choosing wheat, not white with breads, crackers, pastas, micheal, bagels, tortillas, rice  -limiting restaurant or cafeteria eating to twice a week or less    We discussed the importance of restorative sleep and stress management in maintaining a healthy weight.  We discussed the National Weight Control Registry healthy weight maintenance strategies and ways to optimize metabolism.  We discussed the importance of physical activity including cardiovascular and strength training in maintaining a healthier weight.    We discussed the importance of life-long vitamin supplementation and life-long  follow-up.    Yuliana was reminded that, to avoid marginal ulcers she should avoid tobacco at all, alcohol in excess, caffeine in excess, and NSAIDS (unless indicated for cardioprotection or othewise and opposed by a PPI).    Dorothy Coronado MD, MD, [facility].  2/4/2022  9:44 AM  Total time spent on the date of this encounter doing: chart review, review of test results, patient visit, physical exam, education, counseling, developing plan of care, and documenting = 30 minutes.      Video-Visit Details    Type of service:  Video Visit    Originating Location (pt. Location): Home    Distant Location (provider location):  [facility] [place]     Platform used for Video Visit: Mark        Again, thank you for allowing me to participate in the care of your patient.        Sincerely,        Dorothy Coronado MD

## 2022-02-09 ENCOUNTER — PATIENT OUTREACH (OUTPATIENT)
Dept: GASTROENTEROLOGY | Facility: CLINIC | Age: 62
End: 2022-02-09
Payer: COMMERCIAL

## 2022-02-09 NOTE — TELEPHONE ENCOUNTER
Spoke with pt and went over manometry testing. Pt has had testing done prior however was unable to complete supine swallows.

## 2022-02-14 ENCOUNTER — LAB (OUTPATIENT)
Dept: LAB | Facility: CLINIC | Age: 62
End: 2022-02-14
Payer: COMMERCIAL

## 2022-02-14 DIAGNOSIS — Z11.59 ENCOUNTER FOR SCREENING FOR OTHER VIRAL DISEASES: ICD-10-CM

## 2022-02-14 LAB — SARS-COV-2 RNA RESP QL NAA+PROBE: NEGATIVE

## 2022-02-14 PROCEDURE — U0003 INFECTIOUS AGENT DETECTION BY NUCLEIC ACID (DNA OR RNA); SEVERE ACUTE RESPIRATORY SYNDROME CORONAVIRUS 2 (SARS-COV-2) (CORONAVIRUS DISEASE [COVID-19]), AMPLIFIED PROBE TECHNIQUE, MAKING USE OF HIGH THROUGHPUT TECHNOLOGIES AS DESCRIBED BY CMS-2020-01-R: HCPCS

## 2022-02-14 PROCEDURE — U0005 INFEC AGEN DETEC AMPLI PROBE: HCPCS

## 2022-02-17 ENCOUNTER — OFFICE VISIT (OUTPATIENT)
Dept: GASTROENTEROLOGY | Facility: CLINIC | Age: 62
End: 2022-02-17
Payer: COMMERCIAL

## 2022-02-17 VITALS
HEIGHT: 65 IN | WEIGHT: 225 LBS | OXYGEN SATURATION: 95 % | BODY MASS INDEX: 37.49 KG/M2 | DIASTOLIC BLOOD PRESSURE: 80 MMHG | RESPIRATION RATE: 17 BRPM | SYSTOLIC BLOOD PRESSURE: 130 MMHG | TEMPERATURE: 97.9 F | HEART RATE: 75 BPM

## 2022-02-17 DIAGNOSIS — K22.4 JACKHAMMER ESOPHAGUS: ICD-10-CM

## 2022-02-17 PROCEDURE — 91037 ESOPH IMPED FUNCTION TEST: CPT

## 2022-02-17 PROCEDURE — 91010 ESOPHAGUS MOTILITY STUDY: CPT

## 2022-02-17 ASSESSMENT — PAIN SCALES - GENERAL: PAINLEVEL: NO PAIN (0)

## 2022-02-17 NOTE — PATIENT INSTRUCTIONS
Esophogeal Manometry Study  1. Resume regular diet.  2. You may have a bloody nose or sore throat after the procedure.  3. If you have questions call [phone] from 7:00am-5:00pm.  For afterhours questions call GI doctor on call at [phone].      Hospital Address:  [address] [place] [address]

## 2022-02-17 NOTE — PROGRESS NOTES
Non-Invasive GI Procedure Visit    Yuliana Armendariz is a 61 year old female with history of [place] esophagus.   Patient stated reason for procedure: Chest Pain  COVID-19 Test Performed within 48-72hrs of the procedure:  Yes. COVID-19 result was NEGATIVE.    COVID-19 PCR Results    COVID-19 PCR Results 5/26/20 5/27/20 10/5/20 5/16/21 2/14/22   COVID-19 Virus by PCR (External Result) Not Detected Not Detected Not Detected Not Detected    SARS CoV2 PCR     Negative      Comments are available for some flowsheets but are not being displayed.         COVID-19 Antibody Results, Testing for Immunity    COVID-19 Antibody Results, Testing for Immunity   No data to display.             Pre-Procedure Assessment  Patient presents to clinic today for Esophageal Manometry Study    Referring Provider: Dr. Lynch  Patient has undergone previous endoscopy.    Does patient report taking a PPI (omeprazole, pantoprazole, rabeprazole, lansoprazole, esomeprazole, dexlansoprazole)? Yes. Is patient taking a PPI twice daily? Yes  Does patient report taking a H2 blocker (ranitidine, or famotidine)? No  Does patient report taking opioids? No  Patient reported that last food and/or drink was last consumed 12 hours ago.  Esophageal Questionnaire(s) Completed:   Yes - Esophageal Questionnaire(s)    BEDQ Questionnaire  BEDQ Questionnaire: How Often Have You Had the Following? 2/17/2022   Trouble eating solid food (meat, bread, vegetables) 5   Trouble eating soft foods (yogurt, jello, pudding) 0   Trouble swallowing liquids 0   Pain while swallowing 2   Coughing or choking while swallowing foods or liquids 0   Total Score: 7     BEDQ Questionnaire: Discomfort/Pain Ratings 2/17/2022   Eating solid food (meat, bread, vegetables) 5   Eating soft foods (yogurt, jello, pudding) 0   Drinking liquid 0   Total Score: 5       Eckardt Questionnaire  Eckardt Questionnaire 2/17/2022   Dysphagia 3   Regurgitation 1   Retrosternal Pain 2   Weight Loss (kg) 0  "  Total Score:  6       Promis 10 Questionnaire  PROMIS 10 FLOWSHEET DATA 2/17/2022   In general, would you say your health is: 2   In general, would you say your quality of life is: 3   In general, how would you rate your physical health? 3   In general, how would you rate your mental health, including your mood and your ability to think? 5   In general, how would you rate your satisfaction with your social activities and relationships? 5   In general, please rate how well you carry out your usual social activities and roles. (This includes activities at home, at work and in your community, and responsibilities as a parent, child, spouse, employee, friend, etc.) 5   To what extent are you able to carry out your everyday physical activities such as walking, climbing stairs, carrying groceries, or moving a chair? 3   In the past 7 days, how often have you been bothered by emotional problems such as feeling anxious, depressed, or irritable? 3   In the past 7 days, how would you rate your fatigue on average? 4   In the past 7 days, how would you rate your pain on average, where 0 means no pain, and 10 means worst imaginable pain? 8   Mental health question re-calculation - no clinical value 3   Physical health question re-calculation - no clinical value 2   Pain question re-calculation - no clinical value 2   Global Mental Health Score 16   Global Physical Health Score 10   PROMIS TOTAL - SUBSCORES 26       Patient Hx  Patient's history, medications and allergies were reviewed.     Height: 5' 5\"   Weight: 225 lbs 0 oz    There are no problems to display for this patient.     Prior to Admission medications    Medication Sig Start Date End Date Taking? Authorizing Provider   acetaminophen (TYLENOL) 500 MG tablet [ACETAMINOPHEN (TYLENOL) 500 MG TABLET] Take 500-1,000 mg by mouth as needed.  Patient not taking: Reported on 2/17/2022 3/20/19   Provider, Historical   ADVAIR -21 mcg/actuation inhaler [ADVAIR -21 " MCG/ACTUATION INHALER] Inhale 1 puff 2 (two) times a day. 12/1/18   Provider, Historical   albuterol (PROVENTIL HFA;VENTOLIN HFA) 90 mcg/actuation inhaler [ALBUTEROL (PROVENTIL HFA;VENTOLIN HFA) 90 MCG/ACTUATION INHALER] Inhale 2 puffs every 6 (six) hours as needed for wheezing. 2/17/15   Provider, Historical   albuterol (PROVENTIL) 2.5 mg /3 mL (0.083 %) nebulizer solution [ALBUTEROL (PROVENTIL) 2.5 MG /3 ML (0.083 %) NEBULIZER SOLUTION] Take 2.5 mg by nebulization every 6 (six) hours as needed for wheezing. 2/17/15   Provider, Historical   amLODIPine (NORVASC) 5 MG tablet [AMLODIPINE (NORVASC) 5 MG TABLET] Take 2.5 mg by mouth daily.  2/17/15   Provider, Historical   atorvastatin (LIPITOR) 20 MG tablet [ATORVASTATIN (LIPITOR) 20 MG TABLET] Take 20 mg by mouth bedtime. 2/17/15   Provider, Historical   CALCIUM CITRATE/VITAMIN D3 (CITRACAL + D PETITES ORAL) [CALCIUM CITRATE/VITAMIN D3 (CITRACAL + D PETITES ORAL)] Take 2 tablets by mouth 2 (two) times a day.  2/24/15   Provider, Historical   carvedilol (COREG) 3.125 MG tablet [CARVEDILOL (COREG) 3.125 MG TABLET] Take 3.125 mg by mouth bedtime.  2/17/15   Provider, Historical   celecoxib (CELEBREX) 100 MG capsule [CELECOXIB (CELEBREX) 100 MG CAPSULE] Take 1 capsule by mouth 2 (two) times a day. 6/3/19   Provider, Historical   cholecalciferol (VITAMIN D3) 125 mcg (5000 units) capsule Take 1 capsule (125 mcg) by mouth 2 times daily 9/14/21 9/14/22  Tanner Dorothy Coronado MD   clotrimazole (LOTRIMIN) 1 % cream [CLOTRIMAZOLE (LOTRIMIN) 1 % CREAM] Apply 1 application topically 2 (two) times a day. 5/31/19   Provider, Historical   CONTOUR NEXT TEST STRIPS strips [CONTOUR NEXT TEST STRIPS STRIPS] daily. TEST ONCE DAILY 5/4/21   Provider, Historical   cyanocobalamin, vitamin B-12, 1,000 mcg Subl [CYANOCOBALAMIN, VITAMIN B-12, 1,000 MCG SUBL] Place 1 tablet (1,000 mcg total) under the tongue daily. 4/5/16   Dorothy Daniels MD   diltiazem (CARDIZEM) 60 MG tablet  Take 1 tablet by mouth 4 times daily 1/17/22   Reported, Patient   fluocinonide (LIDEX) 0.05 % cream [FLUOCINONIDE (LIDEX) 0.05 % CREAM] Apply 1 application topically 2 (two) times a day. 9/19/17   Provider, Historical   hydrocortisone 2.5 % cream [HYDROCORTISONE 2.5 % CREAM] Apply 1 application topically as needed. 9/19/17   Provider, Historical   levETIRAcetam (KEPPRA) 1000 MG tablet [LEVETIRACETAM (KEPPRA) 1000 MG TABLET] Take 1 tablet by mouth 2 (two) times a day. 4/14/17   Provider, Historical   losartan (COZAAR) 50 MG tablet [LOSARTAN (COZAAR) 50 MG TABLET] Take 50 mg by mouth daily. Two tabs daily 2/17/15   Provider, Historical   miconazole (MICOTIN) 2 % powder [MICONAZOLE (MICOTIN) 2 % POWDER] Apply 1 application topically as needed for itching (for rash under breasts). 2/17/15   Provider, Historical   montelukast (SINGULAIR) 10 mg tablet [MONTELUKAST (SINGULAIR) 10 MG TABLET] Take 10 mg by mouth bedtime. 2/17/15   Provider, Historical   omeprazole (PRILOSEC) 20 MG capsule [OMEPRAZOLE (PRILOSEC) 20 MG CAPSULE] Take 20 mg by mouth 2 (two) times a day.  2/17/15   Provider, Historical   pediatric multivit-iron-min (CEROVITE JR) Chew [PEDIATRIC MULTIVIT-IRON-MIN (CEROVITE JR) CHEW] Chew 1 tablet 2 (two) times a day. 4/17/17   Dorothy Daniels MD   phentermine (ADIPEX-P) 37.5 MG tablet TAKE ONE-HALF TO ONE TABLET BY MOUTH ONE TIME DAILY BEFORE BREAKFAST 2/8/22   Dorothy Daniels MD   QUEtiapine (SEROQUEL) 25 MG tablet [QUETIAPINE (SEROQUEL) 25 MG TABLET] Take 25 mg by mouth at bedtime. 6/2/21   Provider, Historical   SF 5000 PLUS 1.1 % Crea [SF 5000 PLUS 1.1 % CREA] Apply 1 application topically as needed. 4/5/17   Provider, Historical   SYMBICORT 80-4.5 MCG/ACT Inhaler INHALE 2 PUFFS BY MOUTH TWICE DAILY. RINSE MOUTH /. GARGLE AFTER USE 1/19/22   Reported, Patient   traZODone (DESYREL) 50 MG tablet [TRAZODONE (DESYREL) 50 MG TABLET] at bedtime as needed. 3/8/21   Provider, Historical    venlafaxine (EFFEXOR-XR) 150 MG 24 hr capsule [VENLAFAXINE (EFFEXOR-XR) 150 MG 24 HR CAPSULE] Take 1 capsule by mouth daily. 4/4/17   Provider, Historical     Allergies   Allergen Reactions     Aspirin Other (See Comments)     Hx gastric bypass     Hydrocodone Other (See Comments)     Itching     Latex Unknown     Itchy     Vicodin [Hydrocodone-Acetaminophen] Unknown     Itchy     Past Medical History:   Diagnosis Date     Asthma      Back pain      Congestive heart failure (H)      COPD (chronic obstructive pulmonary disease) (H)      Degenerative disc disease      Diabetes type 2, controlled (H)      Dyslipidemia      GERD (gastroesophageal reflux disease)      Gout      Hyperparathyroidism (H)      Hyperparathyroidism, secondary (H)      Hypertension      Low bone mass      Metabolic syndrome      Morbid obesity (H)      Osteoarthritis      Reflux      Reflux      Past Surgical History:   Procedure Laterality Date     ARTHROSCOPY KNEE WITH MENISCAL REPAIR Left 01/2019     CHOLECYSTECTOMY       HYSTERECTOMY       REVISION RIAN-EN-Y  4/23/2014    [facility]     TOTAL KNEE ARTHROPLASTY Left 03/2019     TUBAL LIGATION       Family History   Problem Relation Age of Onset     Diabetes Mother      Heart Disease Mother      Hyperlipidemia Mother      Hypertension Mother      Social History     Tobacco Use     Smoking status: Former Smoker     Smokeless tobacco: Never Used     Tobacco comment: quit over 25 years ago   Substance Use Topics     Alcohol use: Yes     Comment: Alcoholic Drinks/day: Rarely        Pre-Procedure Education & Consent  Procedure education was provided to: Patient  Teaching method: Explanation  Barriers to learning: No Barrier    Patient indicated understanding of pre-procedure instruction and appropriate consent was obtained and documented.    ____________________________________________________________________    Post-Procedure Documentation: Esophageal Manometry    Manometry catheter was placed via  right nare to 49.5 cm and normal saline swallows given per protocol. Manometry catheter was removed at the end of test.    Discharge instructions given to patient.    Notification of pending test results sent to provider for interpretation. Please reference scanned document for final interpretation of results. Patient will follow up with referring provider for test results.    Samanta Gonzalez RN on 2/17/2022 at 10:19 AM

## 2022-02-22 DIAGNOSIS — K22.4 JACKHAMMER ESOPHAGUS: Primary | ICD-10-CM

## 2022-02-22 DIAGNOSIS — R13.19 ESOPHAGEAL DYSPHAGIA: ICD-10-CM

## 2022-02-22 DIAGNOSIS — R07.89 NON-CARDIAC CHEST PAIN: ICD-10-CM

## 2022-03-13 DIAGNOSIS — Z11.59 ENCOUNTER FOR SCREENING FOR OTHER VIRAL DISEASES: Primary | ICD-10-CM

## 2022-03-22 ENCOUNTER — TELEPHONE (OUTPATIENT)
Dept: GASTROENTEROLOGY | Facility: CLINIC | Age: 62
End: 2022-03-22
Payer: COMMERCIAL

## 2022-03-22 NOTE — TELEPHONE ENCOUNTER
Called to remind patient of their upcoming appointment with our GI clinic, on 3/29/2022 at 0740 with Dr. Lynch. This appointment is scheduled as a video visit. You will receive a call approximately 30 minutes prior to check you in, you must be in [place] for this visit., if your appointment is virtual (video or telephone) you need to be in [place] for the visit. To reschedule or cancel patient to call [phone].    Itzel Galdamez, JESUS

## 2022-03-24 ENCOUNTER — VIRTUAL VISIT (OUTPATIENT)
Dept: SURGERY | Facility: CLINIC | Age: 62
End: 2022-03-24
Payer: COMMERCIAL

## 2022-03-24 VITALS — HEIGHT: 65 IN | WEIGHT: 225 LBS | BODY MASS INDEX: 37.49 KG/M2

## 2022-03-24 DIAGNOSIS — Z98.84 BARIATRIC SURGERY STATUS: Primary | ICD-10-CM

## 2022-03-24 DIAGNOSIS — E66.812 OBESITY, CLASS II, BMI 35-39.9, ISOLATED (SEE ACTUAL BMI): ICD-10-CM

## 2022-03-24 DIAGNOSIS — R63.2 HYPERPHAGIA: ICD-10-CM

## 2022-03-24 PROCEDURE — 97803 MED NUTRITION INDIV SUBSEQ: CPT | Mod: TEL | Performed by: DIETITIAN, REGISTERED

## 2022-03-24 NOTE — LETTER
3/24/2022         RE: Yuliana Armendariz  [address]  [facility] [address]        Dear Colleague,    Thank you for referring your patient, Yuliana Armendariz, to the [facility] [place]. Please see a copy of my visit note below.    Yuliana Armendariz is a 61 year old who is being evaluated via a billable telephone visit.      What phone number would you like to be contacted at? [phone]  How would you like to obtain your AVS? MyChart      Post-op Surgical Weight Loss Diet Evaluation     Assessment:  Pt presents for 8 years post-op RD visit, s/p RYGB on 4-23-14 with Dr. Sanchez. Today we reviewed current eating habits and level of physical activity, and instructed on the changes that are required for successful bariatric outcomes.    Patient Progress: states it has been   +going to the U for her esophagus  +trying to eat what she can- might skip meals due to lack of appetite    Initial weight: 233lb  Current weight: 225lb  Weight change: down 8lb- patient is unsure if this weight is correct, she feels like she weighs less than this, but has not weighed self    Body mass index is 37.44 kg/m .    Patient Active Problem List   Diagnosis   (none) - all problems resolved or deleted       Vitamins   Multi Vit with Iron: yes  Calcium Citrate: yes  B12: yes  D3: yes      Diet Recall/Time: watching portion sizes, not eating out as much  +feels like she might have lost weight  +patient states she is focusing on protein, adding in some veggies when able, really trying to stay away from starchy foods      PES statement:      (NC-1.4) Altered GI Function related to Alteration in gastrointestinal tract structure and/or function/ Decreased functional length of the GI tract as evidenced by Weight loss of 3.43% initial body weight; Gastric bypass surgery  (NB-1.7) Undesirable food choices related to Failure to adjust for lifestyle changes as evidenced by low protein intake at  "meals; large portions; skipping meals; frequent grazing;  mindless eating ; drinking with meals, not chewing each bite to applesauce consistency; consuming caffeine/carbonation daily, frequent restaurant intake; and no structured activity regimen    Intervention    Discussion  1. Recommended to consume 15-20gm protein at 3 meals daily, along with protein supplement/\"planned protein containing snack\" of 15-30gm protein, to reach goal of 60-80 gm protein daily.  2. Reviewed lean protein sources  3. Bariatric Plate Method-  including lean/low fat protein at each meal, including a vegetable/fruit, and limiting carbohydrate intake to less than 25% of plate volume.       Handouts provided:   18 months and beyond RYGB    Assessment/Plan:    Pt to follow in 1 month with bariatrician and 2 months with RD      Phone call duration: 25 minutes    KIRTI REYES RD          Again, thank you for allowing me to participate in the care of your patient.        Sincerely,        KIRTI REYES RD    "

## 2022-03-24 NOTE — PROGRESS NOTES
Yuliana Armendariz is a 61 year old who is being evaluated via a billable telephone visit.      What phone number would you like to be contacted at? [phone]  How would you like to obtain your AVS? 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 Sanchez. Today we reviewed current eating habits and level of physical activity, and instructed on the changes that are required for successful bariatric outcomes.    Patient Progress: states it has been   +going to the U for her esophagus  +trying to eat what she can- might skip meals due to lack of appetite    Initial weight: 233lb  Current weight: 225lb  Weight change: down 8lb- patient is unsure if this weight is correct, she feels like she weighs less than this, but has not weighed self    Body mass index is 37.44 kg/m .    Patient Active Problem List   Diagnosis   (none) - all problems resolved or deleted       Vitamins   Multi Vit with Iron: yes  Calcium Citrate: yes  B12: yes  D3: yes      Diet Recall/Time: watching portion sizes, not eating out as much  +feels like she might have lost weight  +patient states she is focusing on protein, adding in some veggies when able, really trying to stay away from starchy foods      PES statement:      (NC-1.4) Altered GI Function related to Alteration in gastrointestinal tract structure and/or function/ Decreased functional length of the GI tract as evidenced by Weight loss of 3.43% initial body weight; Gastric bypass surgery  (NB-1.7) Undesirable food choices related to Failure to adjust for lifestyle changes as evidenced by low protein intake at meals; large portions; skipping meals; frequent grazing;  mindless eating ; drinking with meals, not chewing each bite to applesauce consistency; consuming caffeine/carbonation daily, frequent restaurant intake; and no structured activity regimen    Intervention    Discussion  1. Recommended  "to consume 15-20gm protein at 3 meals daily, along with protein supplement/\"planned protein containing snack\" of 15-30gm protein, to reach goal of 60-80 gm protein daily.  2. Reviewed lean protein sources  3. Bariatric Plate Method-  including lean/low fat protein at each meal, including a vegetable/fruit, and limiting carbohydrate intake to less than 25% of plate volume.       Handouts provided:   18 months and beyond RYGB    Assessment/Plan:    Pt to follow in 1 month with bariatrician and 2 months with RD      Phone call duration: 25 minutes    KIRTI REYES RD      "

## 2022-03-29 ENCOUNTER — PRE VISIT (OUTPATIENT)
Dept: GASTROENTEROLOGY | Facility: CLINIC | Age: 62
End: 2022-03-29

## 2022-03-29 ENCOUNTER — VIRTUAL VISIT (OUTPATIENT)
Dept: GASTROENTEROLOGY | Facility: CLINIC | Age: 62
End: 2022-03-29
Payer: COMMERCIAL

## 2022-03-29 DIAGNOSIS — R07.89 NON-CARDIAC CHEST PAIN: ICD-10-CM

## 2022-03-29 DIAGNOSIS — R11.10 REGURGITATION OF FOOD: ICD-10-CM

## 2022-03-29 DIAGNOSIS — I50.9 CONGESTIVE HEART FAILURE, UNSPECIFIED HF CHRONICITY, UNSPECIFIED HEART FAILURE TYPE (H): ICD-10-CM

## 2022-03-29 DIAGNOSIS — K22.4 ESOPHAGEAL DYSKINESIA: Primary | ICD-10-CM

## 2022-03-29 DIAGNOSIS — R13.19 ESOPHAGEAL DYSPHAGIA: ICD-10-CM

## 2022-03-29 PROCEDURE — 99205 OFFICE O/P NEW HI 60 MIN: CPT | Mod: GT | Performed by: INTERNAL MEDICINE

## 2022-03-29 NOTE — LETTER
"    3/29/2022         RE: Yuliana Armendariz  [address]  [facility] [address]        Dear Colleague,    Thank you for referring your patient, Yuliana Armendariz, to the [facility] GASTRO[facility]OLOG[facility] [place]. Please see a copy of my visit note below.      Start 752  End 8:37  [facility]    Gastroenterology Visit for: Yuliana Armendariz 1960   MRN: [id number]     Reason for Visit:  chief complaint    Referred by: Syed  / [address] [place] [address] / [facility] [id number]  Patient Care Team:  Nate Cash, Mayda CORTÉS MD as PCP - General    History of Present Illness:   Yuliana Armendariz is a 61 year old female with asthma/COPD, GIORGIO with CPAP, HTN, HLD, CHF (No CVA or MI), seizure disorder (on keppra almost 10 years, bariatric surgery (onesimo-en-y 4/23/2014), dysphonia who is presenting as a follow up patient in consultation at the request of Dr. Nino ([facility]) with a chief complaint of dysphagia and hypercontractile esophagus.  ---------------------------------------------------------------  Yuliana Armendariz states her main symptoms are chest pain and regurgitation along with dysphagia.    Not currently taking omeprazole since taking diltiazem. Patient states that she has heartburn which is worse now that she is off of her PPI. The patient has a history of bariatric surgery. Her symptoms started with non-cardiac chest pain and regurgitation. She feels that food will get stuck and then regurgitate after 30-40 minutes. She can sip room temperature water to help with food that gets stuck. Cold water causes more discomfort. She occasionally drinks ginger ale to see if that helps \"break up acid\". She chews food very thoroughly. Barely eats bread because of symptoms. She is scared to eat foods because of it getting caught. Sweet potatoes and baked chicken.     Her symptoms have been ongoing for \"years\" but now it is worse. Worsening over the past year.     Has had prior endoscopy with balloon dilation to " 19mm for a non-obstructing schatzki ring. This did not improve her symptoms. She has been on diltiazem and peppermint without relief.     Daughter diagnosed with achalasia and underwent surgery. She was diagnosed when she was in high school. Father of her daughter has dysphagia as well. Patient does not know of anyone on her side of the family    Patient feels that she has lost approximately 35 pounds however her objective weight is 225lbs.  ---------------------------------------------------------------     Yuliana Armendariz denies  odynophagia,  nausea, vomiting, early satiety, abdominal pain, abdominal distension/bloating, diarrhea, constipation, hematochezia, or melena. No unintentional weight loss.     Wt Readings from Last 5 Encounters:   03/24/22 102.1 kg (225 lb)   02/17/22 102.1 kg (225 lb)   02/04/22 86.2 kg (190 lb)   06/17/21 98.8 kg (217 lb 14.4 oz)   06/10/21 83.9 kg (185 lb)        Esophageal Questionnaire(s)    BEDQ Questionnaire  BEDQ Questionnaire: How Often Have You Had the Following? 2/17/2022 3/29/2022   Trouble eating solid food (meat, bread, vegetables) 5 4   Trouble eating soft foods (yogurt, jello, pudding) 0 2   Trouble swallowing liquids 0 0   Pain while swallowing 2 4   Coughing or choking while swallowing foods or liquids 0 5   Total Score: 7 15     BEDQ Questionnaire: Discomfort/Pain Ratings 2/17/2022 3/29/2022   Eating solid food (meat, bread, vegetables) 5 3   Eating soft foods (yogurt, jello, pudding) 0 3   Drinking liquid 0 0   Total Score: 5 6       Eckardt Questionnaire  Eckardt Questionnaire 2/17/2022 3/29/2022   Dysphagia 3 3   Regurgitation 1 1   Retrosternal Pain 2 1   Weight Loss (kg) 0 0   Total Score:  6 5       Promis 10 Questionnaire  PROMIS 10 FLOWSHEET DATA 2/17/2022 3/29/2022   In general, would you say your health is: 2 2   In general, would you say your quality of life is: 3 3   In general, how would you rate your physical health? 3 2   In general, how would you rate  your mental health, including your mood and your ability to think? 5 5   In general, how would you rate your satisfaction with your social activities and relationships? 5 5   In general, please rate how well you carry out your usual social activities and roles. (This includes activities at home, at work and in your community, and responsibilities as a parent, child, spouse, employee, friend, etc.) 5 5   To what extent are you able to carry out your everyday physical activities such as walking, climbing stairs, carrying groceries, or moving a chair? 3 3   In the past 7 days, how often have you been bothered by emotional problems such as feeling anxious, depressed, or irritable? 3 2   In the past 7 days, how would you rate your fatigue on average? 4 3   In the past 7 days, how would you rate your pain on average, where 0 means no pain, and 10 means worst imaginable pain? 8 8   Mental health question re-calculation - no clinical value 3 4   Physical health question re-calculation - no clinical value 2 3   Pain question re-calculation - no clinical value 2 2   Global Mental Health Score 16 17   Global Physical Health Score 10 10   PROMIS TOTAL - SUBSCORES 26 27           STUDIES & PROCEDURES:    EGD:   Date:  Impression:  Pathology Report:    Colonoscopy:  Date:  Impression:  Pathology Report:     EndoFLIP directed at the UES or LES (8cm (EF-325) balloon length or 16cm (EF-322) balloon length):   Date:  8cm balloon  Balloon inflation Balloon pressure CSA (mm^2) DI (mm^2/mmHg) Dmin (mm) Compliance   20 (ladmark ID)        30        40        50           16cm balloon  Balloon inflation Balloon pressure CSA (mm^2) DI (mm^2/mmHg) Dmin (mm) Compliance   30 (ladmark ID)        40        50        60        70           High Resolution Manometry:  Date: 2/17/22  Impression:  HRM   The baseline tone of the lower esophageal sphincter was normotensive. The lower esophageal sphincter was not able to relax appropriately as  measured by the IRP (17.0). The EGJ morphology was type 1. There was some increased pressurization below the LES which may be due to the catheter coiling either at the LES or in the gastric pouch post onesimo-en-y bypass. There was peristalsis visible. The DCI, DL, and contractile pattern were not within normal limits. 8/10 swallows were hypercontractile. There were 3/10 swallows with elevated intrabolus pressure and compartmentalized pressurization. Rapid water swallows were performed with less than normal augmentation. Rapid Drink Challenge does not indicate an elevated IRP. Supine liquid swallows were performed. Upright liquid swallows were performed with a median upright IRP of 8.9. All 5/5 swallows were hypercontractile and all 5 had intrabolus pressurization. Bolus transit as measured by impedance was complete in all 10/10 swallows. This is   most consistent with hypercontractile esophagus.       Wording of procedure description and interpretation was adapted from [facility] Weekly Motility Conference (2018).       Impressions   Based on the most recent Goehner Classification v4.0, the findings are most consistent with hypercontractile esophagus. There is some intrabolus pressurization noted and an elevated supine IRP however this does not meet criteria for EGJ outflow obstruction based on the improved IRP seen on upright swallows. An endoscopy with endoFLIP may be warranted to maximally evaluate the EGJ prior to any possible non-medical intervention.     *Note, this study was performed while the patient was taking diltiazem.     Dr Parminder Lynch     PH/Impedance:  Date:  Impression:     Bravo:  48 or 96hr  Date:  Impression:    CT:  Date:  Impression:    Esophagram:  Date:  Impression:     Prior medical records were reviewed including, but not limited to, notes from referring providers, lab work, radiographic tests, and other diagnostic tests. Pertinent results were summarized  above.     History     Past Medical History:   Diagnosis Date     Asthma      Back pain      Congestive heart failure (H)      COPD (chronic obstructive pulmonary disease) (H)      Degenerative disc disease      Diabetes type 2, controlled (H)      Dyslipidemia      GERD (gastroesophageal reflux disease)      Gout      Hyperparathyroidism (H)      Hyperparathyroidism, secondary (H)      Hypertension      Low bone mass      Metabolic syndrome      Morbid obesity (H)      Osteoarthritis      Reflux      Reflux        Past Surgical History:   Procedure Laterality Date     ARTHROSCOPY KNEE WITH MENISCAL REPAIR Left 01/2019     CHOLECYSTECTOMY       HYSTERECTOMY       REVISION RIAN-EN-Y  4/23/2014    Laura     TOTAL KNEE ARTHROPLASTY Left 03/2019     TUBAL LIGATION         Social History     Socioeconomic History     Marital status: Single     Spouse name: Not on file     Number of children: Not on file     Years of education: Not on file     Highest education level: Not on file   Occupational History     Not on file   Tobacco Use     Smoking status: Former Smoker     Smokeless tobacco: Never Used     Tobacco comment: quit over 25 years ago   Substance and Sexual Activity     Alcohol use: Yes     Comment: Alcoholic Drinks/day: Rarely     Drug use: No     Sexual activity: Not Currently     Partners: Male     Birth control/protection: Surgical   Other Topics Concern     Not on file   Social History Narrative     Not on file     Social Determinants of Health     Financial Resource Strain: Not on file   Food Insecurity: Not on file   Transportation Needs: Not on file   Physical Activity: Not on file   Stress: Not on file   Social Connections: Not on file   Intimate Partner Violence: Not At Risk     Fear of Current or Ex-Partner: No     Emotionally Abused: No     Physically Abused: No     Sexually Abused: No   Housing Stability: Not on file       Family History   Problem Relation Age of Onset     Diabetes Mother      Heart  Disease Mother      Hyperlipidemia Mother      Hypertension Mother      Family history reviewed and edited as appropriate    Medications and Allergies:     Outpatient Encounter Medications as of 3/29/2022   Medication Sig Dispense Refill     ADVAIR -21 mcg/actuation inhaler [ADVAIR -21 MCG/ACTUATION INHALER] Inhale 1 puff 2 (two) times a day.  11     albuterol (PROVENTIL HFA;VENTOLIN HFA) 90 mcg/actuation inhaler [ALBUTEROL (PROVENTIL HFA;VENTOLIN HFA) 90 MCG/ACTUATION INHALER] Inhale 2 puffs every 6 (six) hours as needed for wheezing.       albuterol (PROVENTIL) 2.5 mg /3 mL (0.083 %) nebulizer solution [ALBUTEROL (PROVENTIL) 2.5 MG /3 ML (0.083 %) NEBULIZER SOLUTION] Take 2.5 mg by nebulization every 6 (six) hours as needed for wheezing.       amLODIPine (NORVASC) 5 MG tablet [AMLODIPINE (NORVASC) 5 MG TABLET] Take 2.5 mg by mouth daily.        atorvastatin (LIPITOR) 20 MG tablet [ATORVASTATIN (LIPITOR) 20 MG TABLET] Take 20 mg by mouth bedtime.       CALCIUM CITRATE/VITAMIN D3 (CITRACAL + D PETITES ORAL) [CALCIUM CITRATE/VITAMIN D3 (CITRACAL + D PETITES ORAL)] Take 2 tablets by mouth 2 (two) times a day.        carvedilol (COREG) 3.125 MG tablet [CARVEDILOL (COREG) 3.125 MG TABLET] Take 3.125 mg by mouth bedtime.        celecoxib (CELEBREX) 100 MG capsule [CELECOXIB (CELEBREX) 100 MG CAPSULE] Take 1 capsule by mouth 2 (two) times a day.       cholecalciferol (VITAMIN D3) 125 mcg (5000 units) capsule Take 1 capsule (125 mcg) by mouth 2 times daily 180 capsule 3     clotrimazole (LOTRIMIN) 1 % cream [CLOTRIMAZOLE (LOTRIMIN) 1 % CREAM] Apply 1 application topically 2 (two) times a day.  1     CONTOUR NEXT TEST STRIPS strips [CONTOUR NEXT TEST STRIPS STRIPS] daily. TEST ONCE DAILY       cyanocobalamin, vitamin B-12, 1,000 mcg Subl [CYANOCOBALAMIN, VITAMIN B-12, 1,000 MCG SUBL] Place 1 tablet (1,000 mcg total) under the tongue daily. 90 tablet prn     diltiazem (CARDIZEM) 60 MG tablet Take 1 tablet by  mouth 4 times daily       fluocinonide (LIDEX) 0.05 % cream [FLUOCINONIDE (LIDEX) 0.05 % CREAM] Apply 1 application topically 2 (two) times a day.       hydrocortisone 2.5 % cream [HYDROCORTISONE 2.5 % CREAM] Apply 1 application topically as needed.       levETIRAcetam (KEPPRA) 1000 MG tablet [LEVETIRACETAM (KEPPRA) 1000 MG TABLET] Take 1 tablet by mouth 2 (two) times a day.  3     losartan (COZAAR) 50 MG tablet [LOSARTAN (COZAAR) 50 MG TABLET] Take 50 mg by mouth daily. Two tabs daily       miconazole (MICOTIN) 2 % powder [MICONAZOLE (MICOTIN) 2 % POWDER] Apply 1 application topically as needed for itching (for rash under breasts).       montelukast (SINGULAIR) 10 mg tablet [MONTELUKAST (SINGULAIR) 10 MG TABLET] Take 10 mg by mouth bedtime.       pediatric multivit-iron-min (CEROVITE JR) Chew [PEDIATRIC MULTIVIT-IRON-MIN (CEROVITE JR) CHEW] Chew 1 tablet 2 (two) times a day. 180 each 3     phentermine (ADIPEX-P) 37.5 MG tablet TAKE ONE-HALF TO ONE TABLET BY MOUTH ONE TIME DAILY BEFORE BREAKFAST 90 tablet 1     QUEtiapine (SEROQUEL) 25 MG tablet [QUETIAPINE (SEROQUEL) 25 MG TABLET] Take 25 mg by mouth at bedtime.       SF 5000 PLUS 1.1 % Crea [SF 5000 PLUS 1.1 % CREA] Apply 1 application topically as needed.  0     SYMBICORT 80-4.5 MCG/ACT Inhaler INHALE 2 PUFFS BY MOUTH TWICE DAILY. RINSE MOUTH /. GARGLE AFTER USE       traZODone (DESYREL) 50 MG tablet [TRAZODONE (DESYREL) 50 MG TABLET] at bedtime as needed.       venlafaxine (EFFEXOR-XR) 150 MG 24 hr capsule [VENLAFAXINE (EFFEXOR-XR) 150 MG 24 HR CAPSULE] Take 1 capsule by mouth daily.  11     acetaminophen (TYLENOL) 500 MG tablet [ACETAMINOPHEN (TYLENOL) 500 MG TABLET] Take 500-1,000 mg by mouth as needed. (Patient not taking: Reported on 2/17/2022)       omeprazole (PRILOSEC) 20 MG capsule [OMEPRAZOLE (PRILOSEC) 20 MG CAPSULE] Take 20 mg by mouth 2 (two) times a day.  (Patient not taking: Reported on 3/29/2022)       No facility-administered encounter  medications on file as of 3/29/2022.        Allergies   Allergen Reactions     Aspirin Other (See Comments)     Hx gastric bypass     Hydrocodone Other (See Comments)     Itching     Latex Unknown     Itchy     Vicodin [Hydrocodone-Acetaminophen] Unknown     Itchy        Review of systems:  A full 10 point review of systems was obtained and was negative except for the pertinent positives and negatives stated within the HPI.    Objective Findings:   Physical Exam:    Constitutional: There were no vitals taken for this visit.  General: Alert, cooperative, no distress, well-appearing  Head: Atraumatic, normocephalic, no obvious abnormalities   Eyes: EOMI, Sclera anicteric, no obvious conjunctival hemorrhage   Nose: Nares normal, no obvious malformation, no obvious rhinorrhea   Respiratory: normal appearing respiration, no cough  Musculoskeletal: Range of motion intact, no obvious strength deficit  Skin: No jaundice, no obvious rash  Neurologic: AAOx3, no obvious neurologic abnormality  Psychiatric: Normal Affect, appropriate mood  Extremities: No obvious edema, no obvious malformation     Labs, Radiology, Pathology     Lab Results   Component Value Date    WBC 9.9 09/23/2020    WBC 9.6 06/06/2019    WBC 8.9 03/07/2018    HGB 11.7 (L) 09/23/2020    HGB 12.6 06/06/2019    HGB 12.6 03/07/2018     09/23/2020     06/06/2019     03/07/2018    CHOL 204 (H) 06/06/2019    CHOL 168 03/07/2018    CHOL 147 06/05/2015    TRIG 166 (H) 06/06/2019    TRIG 96 03/07/2018    TRIG 82 06/05/2015    HDL 85 06/06/2019    HDL 81 03/07/2018    HDL 88 05/01/2017    ALT 63 (H) 03/08/2021    ALT 29 06/06/2019    ALT 27 03/07/2018    AST 58 (H) 03/08/2021    AST 27 06/06/2019    AST 25 03/07/2018     03/08/2021     06/06/2019     03/07/2018    BUN 8 03/08/2021    BUN 16 06/06/2019    BUN 13 03/07/2018    CO2 33 (H) 03/08/2021    CO2 24 06/06/2019    CO2 28 03/07/2018        Liver Function Studies -   Recent  Labs   Lab Test 03/08/21  1425   PROTTOTAL 7.6   ALBUMIN 4.4   BILITOTAL 0.7   ALKPHOS 174*   AST 58*   ALT 63*          Patient Active Problem List    Diagnosis Date Noted     Congestive heart failure, unspecified HF chronicity, unspecified heart failure type (H) 03/29/2022     Priority: Medium     Esophageal dyskinesia 03/29/2022     Priority: Medium     Non-cardiac chest pain 03/29/2022     Priority: Medium     Regurgitation of food 03/29/2022     Priority: Medium     Esophageal dysphagia 03/29/2022     Priority: Medium      Assessment and Plan   Assessment:    Yuliana Armendariz is a 61 year old female with asthma/COPD, GIORGIO with CPAP, HTN, HLD, CHF (No CVA or MI), seizure disorder (on keppra almost 10 years, bariatric surgery (onesimo-en-y 4/23/2014), dysphonia who is presenting as a follow up patient in consultation at the request of Dr. N[facility] with a chief complaint of dysphagia and hypercontractile esophagus.    The patient was seen in video telemedicine consultation regarding her symptoms of dysphagia and noncardiac chest pain and regurgitation.  She is documented hypercontractile esophagus.  She has been on diltiazem with limited benefit.  She is scheduled to undergo upper endoscopy with Endoflip to further evaluate her EG junction.  At that time I will trial Botox injection for her to determine if this results in some benefit.  Depending on her response and the findings she may be a candidate for peroral endoscopic myotomy (POEM).    Another possible treatment for her could be sildenafil however due to her reported history of CHF she would have to confirm with her cardiologist prior to initiation to make sure that it is safe for her to take medication.    She can certainly continue taking her diltiazem and peppermint if she feels that there is benefit.  Additionally, it may be worthwhile for her to restart taking her PPI to determine if that in conjunction with her current medications results in  symptomatic improvement.    One possible mechanism as to why she has hypercontractile esophagus is that she has a prior Jose Daniel-en-Y gastric bypass.  Post obesity surgery esophageal dysmotility has been described in the literature.    1. Esophageal dyskinesia    2. Esophageal dysphagia    3. Non-cardiac chest pain    4. Regurgitation of food    5. Congestive heart failure, unspecified HF chronicity, unspecified heart failure type (H)       No orders of the defined types were placed in this encounter.     Plan:  1. Please keep your endoscopy with endoFLIP and botox injection appointment  2. Your difficulty with swallowing could be related to the bariatric surgery (Post-obesity surgery esophageal dysmotility: POSED)  3. OK to stop diltiazem or if you wish to continue and add back your omeprazole  4. You may wish to consider per-oral endoscopic myotomy (POEM) in the future. Another medication that can be tried is sildenafil but with your congestive heart failure history you should discuss this with your cardiologist first.    Follow up plan:   Return to clinic 6 months and as needed.    The risks and benefits of my recommendations, as well as other treatment options were discussed with the patient and any available family today. All questions were answered.     o Follow up: As planned above. Today, I personally spent 45 minutes in direct face to face time with the patient, of which greater than 50% of the time was spent in patient education and counseling as described above. Approximately 15 minutes were spent on indirect care associated with the patient's consultation including but not limited to review of: patient medical records to date, clinic visits, hospital records, lab results, imaging studies, procedural documentation, and coordinating care with other providers. The findings from this review are summarized in the above note. All of the above accounted for a cumulative time of 60 minutes and was performed on the  date of service.     The patient verbalized understanding of the plan and was appreciative for the time spent and information provided during the office visit.       Author:   Parminder Lynch DO   of Medicine  Director, Esophageal Disorders Program  Division of Gastroenterology, Hepatology, and Nutrition  [facility]     Documentation assisted by voice recognition and documentation system.

## 2022-03-29 NOTE — Clinical Note
Please call this patient or get schedulers to give directions to the hospital endo. I get the impression she could get lost. Thanks! mickey

## 2022-03-29 NOTE — PATIENT INSTRUCTIONS
It was a pleasure taking care of you today.  I've included a brief summary of our discussion and care plan from today's visit below.  Please review this information with your primary care provider.  _______________________________________________________________________    My recommendations are summarized as follows:    1. Please keep your endoscopy with endoFLIP and botox injection appointment  2. Your difficulty with swallowing could be related to the bariatric surgery (Post-obesity surgery esophageal dysmotility: POSED)  3. OK to stop diltiazem or if you wish to continue and add back your omeprazole  4. You may wish to consider per-oral endoscopic myotomy (POEM) in the future. Another medication that can be tried is sildenafil but with your congestive heart failure history you should discuss this with your cardiologist first.      To schedule endoscopic procedures you may call: [phone]  To schedule radiology tests you may call: [phone]  To schedule an ENT appointment you may call: [phone]    Please call my nurse Ana Lilia ([phone])Samanta ([phone]) with any questions or concerns.  If you were seen through the [facility] please feel free to reach out to Shima at [phone]   --    Return to GI Clinic in 6 months to review your progress.    _______________________________________________________________________    Who do I call with any questions after my visit?  Please be in touch if there are any further questions that arise following today's visit.  There are multiple ways to contact your gastroenterology care team.        During business hours, you may reach a Gastroenterology nurse at [phone] and choose option 3.         To schedule or reschedule an appointment, please call [phone].       You can always send a secure message through Kabongo.  Kabongo messages are answered by your nurse or doctor typically within 24 hours.  Please allow extra time on weekends and  holidays.        For urgent/emergent questions after business hours, you may reach the on-call GI Fellow by contacting the [facility]  at [phone].     How will I get the results of any tests ordered?    You will receive all of your results.  If you have signed up for [employer] Direct[employer], any tests ordered at your visit will be available to you after your physician reviews them.  Typically this takes 1-2 weeks.  If there are urgent results that require a change in your care plan, your physician or nurse will call you to discuss the next steps.      What is Apica?  Apica is a secure way for you to access all of your healthcare records from the [facility].  It is a web based computer program, so you can sign on to it from any location.  It also allows you to send secure messages to your care team.  I recommend signing up for Apica access if you have not already done so and are comfortable with using a computer.      How to I schedule a follow-up visit?  If you did not schedule a follow-up visit today, please call [phone] to schedule a follow-up office visit.      If you feel you received exceptional care and are interested in supporting the clinical and research goals of Dr. Lynch or the Division of Gastroenterology, Hepatology, and Nutrition please contact [email or web address] from the [facility] to discuss opportunities to donate.    Sincerely,    Parmidner Lynch DO   of Medicine  Director, Esophageal Disorders Program  Division of Gastroenterology, Hepatology, and Nutrition  [facility]

## 2022-03-29 NOTE — PROGRESS NOTES
"Yuliana is a 61 year old who is being evaluated via a billable video visit.      How would you like to obtain your AVS? Mail a copy  If the video visit is dropped, the invitation should be resent by: Text to cell phone: [phone]  Will anyone else be joining your video visit? No        Start 752  End 8:37  [facility]    Gastroenterology Visit for: Yuliana Armendariz 1960   MRN: [id number]     Reason for Visit:  chief complaint    Referred by: Syed  / [address] [place] [address] / [facility] [id number]  Patient Care Team:  Nate Cash, Mayda CORTÉS MD as PCP - General    History of Present Illness:   Yuliana Armendariz is a 61 year old female with asthma/COPD, GIORGIO with CPAP, HTN, HLD, CHF (No CVA or MI), seizure disorder (on keppra almost 10 years, bariatric surgery (onesimo-en-y 4/23/2014), dysphonia who is presenting as a follow up patient in consultation at the request of Dr. Nino ([facility]) with a chief complaint of dysphagia and hypercontractile esophagus.  ---------------------------------------------------------------  Yuliana Armendariz states her main symptoms are chest pain and regurgitation along with dysphagia.    Not currently taking omeprazole since taking diltiazem. Patient states that she has heartburn which is worse now that she is off of her PPI. The patient has a history of bariatric surgery. Her symptoms started with non-cardiac chest pain and regurgitation. She feels that food will get stuck and then regurgitate after 30-40 minutes. She can sip room temperature water to help with food that gets stuck. Cold water causes more discomfort. She occasionally drinks ginger ale to see if that helps \"break up acid\". She chews food very thoroughly. Barely eats bread because of symptoms. She is scared to eat foods because of it getting caught. Sweet potatoes and baked chicken.     Her symptoms have been ongoing for \"years\" but now it is worse. Worsening over the past year.     Has had prior endoscopy with balloon dilation to " 19mm for a non-obstructing schatzki ring. This did not improve her symptoms. She has been on diltiazem and peppermint without relief.     Daughter diagnosed with achalasia and underwent surgery. She was diagnosed when she was in high school. Father of her daughter has dysphagia as well. Patient does not know of anyone on her side of the family    Patient feels that she has lost approximately 35 pounds however her objective weight is 225lbs.  ---------------------------------------------------------------     Yuliana Armendariz denies  odynophagia,  nausea, vomiting, early satiety, abdominal pain, abdominal distension/bloating, diarrhea, constipation, hematochezia, or melena. No unintentional weight loss.     Wt Readings from Last 5 Encounters:   03/24/22 102.1 kg (225 lb)   02/17/22 102.1 kg (225 lb)   02/04/22 86.2 kg (190 lb)   06/17/21 98.8 kg (217 lb 14.4 oz)   06/10/21 83.9 kg (185 lb)        Esophageal Questionnaire(s)    BEDQ Questionnaire  BEDQ Questionnaire: How Often Have You Had the Following? 2/17/2022 3/29/2022   Trouble eating solid food (meat, bread, vegetables) 5 4   Trouble eating soft foods (yogurt, jello, pudding) 0 2   Trouble swallowing liquids 0 0   Pain while swallowing 2 4   Coughing or choking while swallowing foods or liquids 0 5   Total Score: 7 15     BEDQ Questionnaire: Discomfort/Pain Ratings 2/17/2022 3/29/2022   Eating solid food (meat, bread, vegetables) 5 3   Eating soft foods (yogurt, jello, pudding) 0 3   Drinking liquid 0 0   Total Score: 5 6       Eckardt Questionnaire  Eckardt Questionnaire 2/17/2022 3/29/2022   Dysphagia 3 3   Regurgitation 1 1   Retrosternal Pain 2 1   Weight Loss (kg) 0 0   Total Score:  6 5       Promis 10 Questionnaire  PROMIS 10 FLOWSHEET DATA 2/17/2022 3/29/2022   In general, would you say your health is: 2 2   In general, would you say your quality of life is: 3 3   In general, how would you rate your physical health? 3 2   In general, how would you rate  your mental health, including your mood and your ability to think? 5 5   In general, how would you rate your satisfaction with your social activities and relationships? 5 5   In general, please rate how well you carry out your usual social activities and roles. (This includes activities at home, at work and in your community, and responsibilities as a parent, child, spouse, employee, friend, etc.) 5 5   To what extent are you able to carry out your everyday physical activities such as walking, climbing stairs, carrying groceries, or moving a chair? 3 3   In the past 7 days, how often have you been bothered by emotional problems such as feeling anxious, depressed, or irritable? 3 2   In the past 7 days, how would you rate your fatigue on average? 4 3   In the past 7 days, how would you rate your pain on average, where 0 means no pain, and 10 means worst imaginable pain? 8 8   Mental health question re-calculation - no clinical value 3 4   Physical health question re-calculation - no clinical value 2 3   Pain question re-calculation - no clinical value 2 2   Global Mental Health Score 16 17   Global Physical Health Score 10 10   PROMIS TOTAL - SUBSCORES 26 27           STUDIES & PROCEDURES:    EGD:   Date:  Impression:  Pathology Report:    Colonoscopy:  Date:  Impression:  Pathology Report:     EndoFLIP directed at the UES or LES (8cm (EF-325) balloon length or 16cm (EF-322) balloon length):   Date:  8cm balloon  Balloon inflation Balloon pressure CSA (mm^2) DI (mm^2/mmHg) Dmin (mm) Compliance   20 (ladmark ID)        30        40        50           16cm balloon  Balloon inflation Balloon pressure CSA (mm^2) DI (mm^2/mmHg) Dmin (mm) Compliance   30 (ladmark ID)        40        50        60        70           High Resolution Manometry:  Date: 2/17/22  Impression:  HRM   The baseline tone of the lower esophageal sphincter was normotensive. The lower esophageal sphincter was not able to relax appropriately as  measured by the IRP (17.0). The EGJ morphology was type 1. There was some increased pressurization below the LES which may be due to the catheter coiling either at the LES or in the gastric pouch post onesimo-en-y bypass. There was peristalsis visible. The DCI, DL, and contractile pattern were not within normal limits. 8/10 swallows were hypercontractile. There were 3/10 swallows with elevated intrabolus pressure and compartmentalized pressurization. Rapid water swallows were performed with less than normal augmentation. Rapid Drink Challenge does not indicate an elevated IRP. Supine liquid swallows were performed. Upright liquid swallows were performed with a median upright IRP of 8.9. All 5/5 swallows were hypercontractile and all 5 had intrabolus pressurization. Bolus transit as measured by impedance was complete in all 10/10 swallows. This is   most consistent with hypercontractile esophagus.       Wording of procedure description and interpretation was adapted from [employer] [facility] Weekly Motility Conference (2018).       Impressions   Based on the most recent [place] Classification v4.0, the findings are most consistent with hypercontractile esophagus. There is some intrabolus pressurization noted and an elevated supine IRP however this does not meet criteria for EGJ outflow obstruction based on the improved IRP seen on upright swallows. An endoscopy with endoFLIP may be warranted to maximally evaluate the EGJ prior to any possible non-medical intervention.     *Note, this study was performed while the patient was taking diltiazem.     Dr Parminder Lynch     PH/Impedance:  Date:  Impression:     Bravo:  48 or 96hr  Date:  Impression:    CT:  Date:  Impression:    Esophagram:  Date:  Impression:     Prior medical records were reviewed including, but not limited to, notes from referring providers, lab work, radiographic tests, and other diagnostic tests. Pertinent results were summarized  above.     History     Past Medical History:   Diagnosis Date     Asthma      Back pain      Congestive heart failure (H)      COPD (chronic obstructive pulmonary disease) (H)      Degenerative disc disease      Diabetes type 2, controlled (H)      Dyslipidemia      GERD (gastroesophageal reflux disease)      Gout      Hyperparathyroidism (H)      Hyperparathyroidism, secondary (H)      Hypertension      Low bone mass      Metabolic syndrome      Morbid obesity (H)      Osteoarthritis      Reflux      Reflux        Past Surgical History:   Procedure Laterality Date     ARTHROSCOPY KNEE WITH MENISCAL REPAIR Left 01/2019     CHOLECYSTECTOMY       HYSTERECTOMY       REVISION RIAN-EN-Y  4/23/2014    [facility]aura     TOTAL KNEE ARTHROPLASTY Left 03/2019     TUBAL LIGATION         Social History     Socioeconomic History     Marital status: Single     Spouse name: Not on file     Number of children: Not on file     Years of education: Not on file     Highest education level: Not on file   Occupational History     Not on file   Tobacco Use     Smoking status: Former Smoker     Smokeless tobacco: Never Used     Tobacco comment: quit over 25 years ago   Substance and Sexual Activity     Alcohol use: Yes     Comment: Alcoholic Drinks/day: Rarely     Drug use: No     Sexual activity: Not Currently     Partners: Male     Birth control/protection: Surgical   Other Topics Concern     Not on file   Social History Narrative     Not on file     Social Determinants of Health     Financial Resource Strain: Not on file   Food Insecurity: Not on file   Transportation Needs: Not on file   Physical Activity: Not on file   Stress: Not on file   Social Connections: Not on file   Intimate Partner Violence: Not At Risk     Fear of Current or Ex-Partner: No     Emotionally Abused: No     Physically Abused: No     Sexually Abused: No   Housing Stability: Not on file       Family History   Problem Relation Age of Onset     Diabetes Mother      Heart  Disease Mother      Hyperlipidemia Mother      Hypertension Mother      Family history reviewed and edited as appropriate    Medications and Allergies:     Outpatient Encounter Medications as of 3/29/2022   Medication Sig Dispense Refill     ADVAIR -21 mcg/actuation inhaler [ADVAIR -21 MCG/ACTUATION INHALER] Inhale 1 puff 2 (two) times a day.  11     albuterol (PROVENTIL HFA;VENTOLIN HFA) 90 mcg/actuation inhaler [ALBUTEROL (PROVENTIL HFA;VENTOLIN HFA) 90 MCG/ACTUATION INHALER] Inhale 2 puffs every 6 (six) hours as needed for wheezing.       albuterol (PROVENTIL) 2.5 mg /3 mL (0.083 %) nebulizer solution [ALBUTEROL (PROVENTIL) 2.5 MG /3 ML (0.083 %) NEBULIZER SOLUTION] Take 2.5 mg by nebulization every 6 (six) hours as needed for wheezing.       amLODIPine (NORVASC) 5 MG tablet [AMLODIPINE (NORVASC) 5 MG TABLET] Take 2.5 mg by mouth daily.        atorvastatin (LIPITOR) 20 MG tablet [ATORVASTATIN (LIPITOR) 20 MG TABLET] Take 20 mg by mouth bedtime.       CALCIUM CITRATE/VITAMIN D3 (CITRACAL + D PETITES ORAL) [CALCIUM CITRATE/VITAMIN D3 (CITRACAL + D PETITES ORAL)] Take 2 tablets by mouth 2 (two) times a day.        carvedilol (COREG) 3.125 MG tablet [CARVEDILOL (COREG) 3.125 MG TABLET] Take 3.125 mg by mouth bedtime.        celecoxib (CELEBREX) 100 MG capsule [CELECOXIB (CELEBREX) 100 MG CAPSULE] Take 1 capsule by mouth 2 (two) times a day.       cholecalciferol (VITAMIN D3) 125 mcg (5000 units) capsule Take 1 capsule (125 mcg) by mouth 2 times daily 180 capsule 3     clotrimazole (LOTRIMIN) 1 % cream [CLOTRIMAZOLE (LOTRIMIN) 1 % CREAM] Apply 1 application topically 2 (two) times a day.  1     CONTOUR NEXT TEST STRIPS strips [CONTOUR NEXT TEST STRIPS STRIPS] daily. TEST ONCE DAILY       cyanocobalamin, vitamin B-12, 1,000 mcg Subl [CYANOCOBALAMIN, VITAMIN B-12, 1,000 MCG SUBL] Place 1 tablet (1,000 mcg total) under the tongue daily. 90 tablet prn     diltiazem (CARDIZEM) 60 MG tablet Take 1 tablet by  mouth 4 times daily       fluocinonide (LIDEX) 0.05 % cream [FLUOCINONIDE (LIDEX) 0.05 % CREAM] Apply 1 application topically 2 (two) times a day.       hydrocortisone 2.5 % cream [HYDROCORTISONE 2.5 % CREAM] Apply 1 application topically as needed.       levETIRAcetam (KEPPRA) 1000 MG tablet [LEVETIRACETAM (KEPPRA) 1000 MG TABLET] Take 1 tablet by mouth 2 (two) times a day.  3     losartan (COZAAR) 50 MG tablet [LOSARTAN (COZAAR) 50 MG TABLET] Take 50 mg by mouth daily. Two tabs daily       miconazole (MICOTIN) 2 % powder [MICONAZOLE (MICOTIN) 2 % POWDER] Apply 1 application topically as needed for itching (for rash under breasts).       montelukast (SINGULAIR) 10 mg tablet [MONTELUKAST (SINGULAIR) 10 MG TABLET] Take 10 mg by mouth bedtime.       pediatric multivit-iron-min (CEROVITE JR) Chew [PEDIATRIC MULTIVIT-IRON-MIN (CEROVITE JR) CHEW] Chew 1 tablet 2 (two) times a day. 180 each 3     phentermine (ADIPEX-P) 37.5 MG tablet TAKE ONE-HALF TO ONE TABLET BY MOUTH ONE TIME DAILY BEFORE BREAKFAST 90 tablet 1     QUEtiapine (SEROQUEL) 25 MG tablet [QUETIAPINE (SEROQUEL) 25 MG TABLET] Take 25 mg by mouth at bedtime.       SF 5000 PLUS 1.1 % Crea [SF 5000 PLUS 1.1 % CREA] Apply 1 application topically as needed.  0     SYMBICORT 80-4.5 MCG/ACT Inhaler INHALE 2 PUFFS BY MOUTH TWICE DAILY. RINSE MOUTH /. GARGLE AFTER USE       traZODone (DESYREL) 50 MG tablet [TRAZODONE (DESYREL) 50 MG TABLET] at bedtime as needed.       venlafaxine (EFFEXOR-XR) 150 MG 24 hr capsule [VENLAFAXINE (EFFEXOR-XR) 150 MG 24 HR CAPSULE] Take 1 capsule by mouth daily.  11     acetaminophen (TYLENOL) 500 MG tablet [ACETAMINOPHEN (TYLENOL) 500 MG TABLET] Take 500-1,000 mg by mouth as needed. (Patient not taking: Reported on 2/17/2022)       omeprazole (PRILOSEC) 20 MG capsule [OMEPRAZOLE (PRILOSEC) 20 MG CAPSULE] Take 20 mg by mouth 2 (two) times a day.  (Patient not taking: Reported on 3/29/2022)       No facility-administered encounter  medications on file as of 3/29/2022.        Allergies   Allergen Reactions     Aspirin Other (See Comments)     Hx gastric bypass     Hydrocodone Other (See Comments)     Itching     Latex Unknown     Itchy     Vicodin [Hydrocodone-Acetaminophen] Unknown     Itchy        Review of systems:  A full 10 point review of systems was obtained and was negative except for the pertinent positives and negatives stated within the HPI.    Objective Findings:   Physical Exam:    Constitutional: There were no vitals taken for this visit.  General: Alert, cooperative, no distress, well-appearing  Head: Atraumatic, normocephalic, no obvious abnormalities   Eyes: EOMI, Sclera anicteric, no obvious conjunctival hemorrhage   Nose: Nares normal, no obvious malformation, no obvious rhinorrhea   Respiratory: normal appearing respiration, no cough  Musculoskeletal: Range of motion intact, no obvious strength deficit  Skin: No jaundice, no obvious rash  Neurologic: AAOx3, no obvious neurologic abnormality  Psychiatric: Normal Affect, appropriate mood  Extremities: No obvious edema, no obvious malformation     Labs, Radiology, Pathology     Lab Results   Component Value Date    WBC 9.9 09/23/2020    WBC 9.6 06/06/2019    WBC 8.9 03/07/2018    HGB 11.7 (L) 09/23/2020    HGB 12.6 06/06/2019    HGB 12.6 03/07/2018     09/23/2020     06/06/2019     03/07/2018    CHOL 204 (H) 06/06/2019    CHOL 168 03/07/2018    CHOL 147 06/05/2015    TRIG 166 (H) 06/06/2019    TRIG 96 03/07/2018    TRIG 82 06/05/2015    HDL 85 06/06/2019    HDL 81 03/07/2018    HDL 88 05/01/2017    ALT 63 (H) 03/08/2021    ALT 29 06/06/2019    ALT 27 03/07/2018    AST 58 (H) 03/08/2021    AST 27 06/06/2019    AST 25 03/07/2018     03/08/2021     06/06/2019     03/07/2018    BUN 8 03/08/2021    BUN 16 06/06/2019    BUN 13 03/07/2018    CO2 33 (H) 03/08/2021    CO2 24 06/06/2019    CO2 28 03/07/2018        Liver Function Studies -   Recent  Labs   Lab Test 03/08/21  1425   PROTTOTAL 7.6   ALBUMIN 4.4   BILITOTAL 0.7   ALKPHOS 174*   AST 58*   ALT 63*          Patient Active Problem List    Diagnosis Date Noted     Congestive heart failure, unspecified HF chronicity, unspecified heart failure type (H) 03/29/2022     Priority: Medium     Esophageal dyskinesia 03/29/2022     Priority: Medium     Non-cardiac chest pain 03/29/2022     Priority: Medium     Regurgitation of food 03/29/2022     Priority: Medium     Esophageal dysphagia 03/29/2022     Priority: Medium      Assessment and Plan   Assessment:    Yuliana Armendariz is a 61 year old female with asthma/COPD, GIORGIO with CPAP, HTN, HLD, CHF (No CVA or MI), seizure disorder (on keppra almost 10 years, bariatric surgery (onesimo-en-y 4/23/2014), dysphonia who is presenting as a follow up patient in consultation at the request of Dr. Nino ([facility]) with a chief complaint of dysphagia and hypercontractile esophagus.    The patient was seen in video telemedicine consultation regarding her symptoms of dysphagia and noncardiac chest pain and regurgitation.  She is documented hypercontractile esophagus.  She has been on diltiazem with limited benefit.  She is scheduled to undergo upper endoscopy with Endoflip to further evaluate her EG junction.  At that time I will trial Botox injection for her to determine if this results in some benefit.  Depending on her response and the findings she may be a candidate for peroral endoscopic myotomy (POEM).    Another possible treatment for her could be sildenafil however due to her reported history of CHF she would have to confirm with her cardiologist prior to initiation to make sure that it is safe for her to take medication.    She can certainly continue taking her diltiazem and peppermint if she feels that there is benefit.  Additionally, it may be worthwhile for her to restart taking her PPI to determine if that in conjunction with her current medications results in  symptomatic improvement.    One possible mechanism as to why she has hypercontractile esophagus is that she has a prior Jose Daniel-en-Y gastric bypass.  Post obesity surgery esophageal dysmotility has been described in the literature.    1. Esophageal dyskinesia    2. Esophageal dysphagia    3. Non-cardiac chest pain    4. Regurgitation of food    5. Congestive heart failure, unspecified HF chronicity, unspecified heart failure type (H)       No orders of the defined types were placed in this encounter.     Plan:  1. Please keep your endoscopy with endoFLIP and botox injection appointment  2. Your difficulty with swallowing could be related to the bariatric surgery (Post-obesity surgery esophageal dysmotility: POSED)  3. OK to stop diltiazem or if you wish to continue and add back your omeprazole  4. You may wish to consider per-oral endoscopic myotomy (POEM) in the future. Another medication that can be tried is sildenafil but with your congestive heart failure history you should discuss this with your cardiologist first.    Follow up plan:   Return to clinic 6 months and as needed.    The risks and benefits of my recommendations, as well as other treatment options were discussed with the patient and any available family today. All questions were answered.     o Follow up: As planned above. Today, I personally spent 45 minutes in direct face to face time with the patient, of which greater than 50% of the time was spent in patient education and counseling as described above. Approximately 15 minutes were spent on indirect care associated with the patient's consultation including but not limited to review of: patient medical records to date, clinic visits, hospital records, lab results, imaging studies, procedural documentation, and coordinating care with other providers. The findings from this review are summarized in the above note. All of the above accounted for a cumulative time of 60 minutes and was performed on the  date of service.     The patient verbalized understanding of the plan and was appreciative for the time spent and information provided during the office visit.     Author:   Parminder Lynch DO   of Medicine  Director, Esophageal Disorders Program  Division of Gastroenterology, Hepatology, and Nutrition  [facility]     Documentation assisted by voice recognition and documentation system.

## 2022-04-18 ENCOUNTER — TELEPHONE (OUTPATIENT)
Dept: GASTROENTEROLOGY | Facility: CLINIC | Age: 62
End: 2022-04-18

## 2022-04-18 NOTE — TELEPHONE ENCOUNTER
Patient scheduled for EGD with Endoflip on 4.26.22.     Covid test scheduled: 4.22.22    Pre op exam scheduled: ? Needs    Arrival time: 1245    Facility location: [facility]    Sedation type: MAC    Indication for procedure: dysphagia, non cardiac chest pain     Anticoagulations? No     Pre visit planning completed.    Yuliana Garcia RN

## 2022-04-19 ENCOUNTER — TELEPHONE (OUTPATIENT)
Dept: GASTROENTEROLOGY | Facility: CLINIC | Age: 62
End: 2022-04-19

## 2022-04-19 NOTE — TELEPHONE ENCOUNTER
Call placed to patient to discuss EGD with endoflip at [facility] with MAC. Patient had a pre op exam with PCP at [facility] 2.7.22. Advised patient a pre op needs to be within 30 days however will check and call back. Patient verbalized understanding and in agreement with plan.

## 2022-04-19 NOTE — TELEPHONE ENCOUNTER
Call placed to patient to discuss that she will need a more recent pre op exam prior to EGD with endoflip/MAC anesthesia. Patient verbalized understanding and will call PCP office to schedule, then call back to our endoscopy department.

## 2022-04-20 NOTE — TELEPHONE ENCOUNTER
Attempted to contact patient regarding upcoming EGD with endoflip procedure on 4.26.22 regarding needed pre op appointment. No answer.     Left message to return call to [phone] #2    Yuliana Garcia RN

## 2022-04-20 NOTE — TELEPHONE ENCOUNTER
Patient calling, [facility] has a Pre- op appointment scheduled at [facility] in [place] 4.22.22. Fax number provided, [phone].

## 2022-04-21 ENCOUNTER — OFFICE VISIT (OUTPATIENT)
Dept: SURGERY | Facility: CLINIC | Age: 62
End: 2022-04-21
Payer: COMMERCIAL

## 2022-04-21 ENCOUNTER — LAB (OUTPATIENT)
Dept: LAB | Facility: CLINIC | Age: 62
End: 2022-04-21

## 2022-04-21 VITALS
HEIGHT: 65 IN | WEIGHT: 223 LBS | BODY MASS INDEX: 37.15 KG/M2 | DIASTOLIC BLOOD PRESSURE: 80 MMHG | SYSTOLIC BLOOD PRESSURE: 132 MMHG

## 2022-04-21 DIAGNOSIS — K91.2 POSTOPERATIVE MALABSORPTION: Primary | ICD-10-CM

## 2022-04-21 DIAGNOSIS — K22.4 ESOPHAGEAL DYSKINESIA: ICD-10-CM

## 2022-04-21 DIAGNOSIS — E66.01 MORBID OBESITY (H): ICD-10-CM

## 2022-04-21 DIAGNOSIS — K91.2 POSTOPERATIVE MALABSORPTION: ICD-10-CM

## 2022-04-21 LAB
ALBUMIN SERPL-MCNC: 4.2 G/DL (ref 3.5–5)
ALP SERPL-CCNC: 299 U/L (ref 45–120)
ALT SERPL W P-5'-P-CCNC: 63 U/L (ref 0–45)
ANION GAP SERPL CALCULATED.3IONS-SCNC: 17 MMOL/L (ref 5–18)
AST SERPL W P-5'-P-CCNC: 100 U/L (ref 0–40)
BILIRUB SERPL-MCNC: 0.7 MG/DL (ref 0–1)
BUN SERPL-MCNC: 8 MG/DL (ref 8–22)
CALCIUM SERPL-MCNC: 9.9 MG/DL (ref 8.5–10.5)
CHLORIDE BLD-SCNC: 100 MMOL/L (ref 98–107)
CHOLEST SERPL-MCNC: 181 MG/DL
CO2 SERPL-SCNC: 27 MMOL/L (ref 22–31)
CREAT SERPL-MCNC: 0.81 MG/DL (ref 0.6–1.1)
DEPRECATED CALCIDIOL+CALCIFEROL SERPL-MC: 99 UG/L (ref 20–75)
ERYTHROCYTE [DISTWIDTH] IN BLOOD BY AUTOMATED COUNT: 13.3 % (ref 10–15)
FASTING STATUS PATIENT QL REPORTED: YES
FERRITIN SERPL-MCNC: 121 NG/ML (ref 10–130)
FOLATE SERPL-MCNC: 19 NG/ML
GFR SERPL CREATININE-BSD FRML MDRD: 83 ML/MIN/1.73M2
GLUCOSE BLD-MCNC: 94 MG/DL (ref 70–125)
HBA1C MFR BLD: 5.9 % (ref 0–5.6)
HCT VFR BLD AUTO: 38.1 % (ref 35–47)
HDLC SERPL-MCNC: 92 MG/DL
HGB BLD-MCNC: 12.5 G/DL (ref 11.7–15.7)
LDLC SERPL CALC-MCNC: 68 MG/DL
MCH RBC QN AUTO: 30.9 PG (ref 26.5–33)
MCHC RBC AUTO-ENTMCNC: 32.8 G/DL (ref 31.5–36.5)
MCV RBC AUTO: 94 FL (ref 78–100)
PLATELET # BLD AUTO: 346 10E3/UL (ref 150–450)
POTASSIUM BLD-SCNC: 3.6 MMOL/L (ref 3.5–5)
PROT SERPL-MCNC: 7.5 G/DL (ref 6–8)
PTH-INTACT SERPL-MCNC: 89 PG/ML (ref 10–86)
RBC # BLD AUTO: 4.05 10E6/UL (ref 3.8–5.2)
SODIUM SERPL-SCNC: 144 MMOL/L (ref 136–145)
TRIGL SERPL-MCNC: 105 MG/DL
TSH SERPL DL<=0.005 MIU/L-ACNC: 16.79 UIU/ML (ref 0.3–5)
VIT B12 SERPL-MCNC: >2000 PG/ML (ref 213–816)
WBC # BLD AUTO: 9.4 10E3/UL (ref 4–11)

## 2022-04-21 PROCEDURE — 83036 HEMOGLOBIN GLYCOSYLATED A1C: CPT

## 2022-04-21 PROCEDURE — 82607 VITAMIN B-12: CPT

## 2022-04-21 PROCEDURE — 85027 COMPLETE CBC AUTOMATED: CPT

## 2022-04-21 PROCEDURE — 84630 ASSAY OF ZINC: CPT | Mod: 90

## 2022-04-21 PROCEDURE — 36415 COLL VENOUS BLD VENIPUNCTURE: CPT

## 2022-04-21 PROCEDURE — 82728 ASSAY OF FERRITIN: CPT

## 2022-04-21 PROCEDURE — 84443 ASSAY THYROID STIM HORMONE: CPT

## 2022-04-21 PROCEDURE — 80061 LIPID PANEL: CPT

## 2022-04-21 PROCEDURE — 84590 ASSAY OF VITAMIN A: CPT | Mod: 90

## 2022-04-21 PROCEDURE — 82746 ASSAY OF FOLIC ACID SERUM: CPT

## 2022-04-21 PROCEDURE — 82306 VITAMIN D 25 HYDROXY: CPT

## 2022-04-21 PROCEDURE — 99214 OFFICE O/P EST MOD 30 MIN: CPT | Performed by: FAMILY MEDICINE

## 2022-04-21 PROCEDURE — 99000 SPECIMEN HANDLING OFFICE-LAB: CPT

## 2022-04-21 PROCEDURE — 83970 ASSAY OF PARATHORMONE: CPT

## 2022-04-21 PROCEDURE — 80053 COMPREHEN METABOLIC PANEL: CPT

## 2022-04-21 PROCEDURE — 84425 ASSAY OF VITAMIN B-1: CPT | Mod: 90

## 2022-04-21 NOTE — PATIENT INSTRUCTIONS
[facility]  Nutritional Guidelines  Gastric Bypass 18 Months Post Op and Beyond    General Guidelines and Helpful Hints:  Eat 3 meals per day + protein supplement(s). No snacks between meals.  Do not skip meals.  This can cause overeating at the next meal and will prevent adequate protein and nutritional intake.  Aim for 60-80 grams of protein per day.  Always eat your protein first. This assists with optimal nutrition and helps you stay full longer.  Depending on your portion size, you may need to drink approved protein supplement between meals to achieve protein goals. Follow recommendations of your Dietitian.   Eat your protein first, and then follow with fiber.   It is not necessary to count your fiber, but 15-20 grams per day is recommended.    Add fiber by including fruits, vegetables, whole grains, and beans.   Portions should remain about 1 cup per meal. Use measuring cups to be accurate.  Continue to use saucer/salad plates, infant/toddler silverware to keep portion sizes small and take small bites.  Eat S-L-O-W-L-Y to make each meal last 20-30 minutes. Always stop eating when satisfied.  Continue to use caution with foods containing skins, peels or membranes. Chew well!  Aim for 64 oz. of calorie-free fluids daily.  Continue to avoid caffeine and carbonation. If you choose to drink alcohol, do so in moderation.   Remember to avoid drinking during meals, 15-30 minutes before and 30 minutes after.  Exercise is knapp for continued weight loss and weight maintenance. Aim for 30-60 minutes of physical activity most days of the week. Include cardiovascular and strength training.  If having trouble tolerating meat, try using a crock-pot, tinfoil tent, steamer or other moist cooking method to create tender meats. Add broth or low-fat gravy to help meat stay moist.   Avoid high sugar and high fat foods to prevent dumping syndrome.  Check nutrition labels for less than 10 grams of sugar and less than 10  grams of fat per serving.  Continue Taking Vitamins/Minerals:  8809-9574 mcg of Sublingual B-12 daily  1 Multivitamin with Iron twice daily (chewable or swallow tabs)  500-600 mg Calcium Citrate twice daily (chewable or swallow tabs)  5000 IU Vitamin D3 daily    Sample Grocery List    Protein:  Fat free Greek or light yogurt (less than 10 grams sugar)  Fat free or low-fat cottage cheese  String cheese or reduced fat cheese slices  Tuna, salmon, crab, egg, or chicken salad made with light or fat free mayonnaise  Egg or Egg Substitute  Lean/extra lean turkey, beef, bison, venison (ground, sirloin, round, flank)  Pork loin or tenderloin (grilled, baked, broiled)  Fish such as salmon, tuna, trout, tilapia, etc. (grilled, baked, broiled)  Tender cuts of lean (skinless) turkey or chicken  Lean deli meats: turkey, lean ham, chicken, lean roast beef  Beans such as kidney, garbanzo, black, casey, or low-fat/fat free refried beans  Peanut butter (natural preferred). Limit to 1 Tbsp. per day.  Low-fat meatloaf (made with lean ground beef or turkey)  Sloppy Joes made with low-sugar ketchup and lean ground beef or turkey  Soy or vegetable protein (i.e. vegan crumbles, soy/veggie burger, tofu)  Hummus    Vegetables:  Fresh: cooked or raw (as tolerated)  Frozen vegetables  Canned vegetables (low sodium or no salt added, rinse before cooking/eating)  (Ok to have skins/peels/membranes/seeds - just chew well)    Fruits:  Fresh fruit  Frozen fruit (no sugar added)  Canned fruit (packed in its own juice, NOT syrup)  (Ok to have skins/peels/membranes/seeds - just chew well)    Starch:  Unsweetened whole-grain hot cereal (or high fiber cold cereal, dry)  Toasted whole wheat bread or [place] Thins  Whole grain crackers  Baked /boiled/mashed potato/sweet potato  Cooked whole grain pasta, brown rice, or other cooked whole grains  Starchy vegetables: corn, peas, winter squash    Protein Supplement:   Ready to drink protein shake  with:  15-30 grams protein per serving  Less than 10 grams total carbohydrate per serving   Protein powder mixed with:   Skim or 1% milk  Low fat or fat free Lactaid milk, plain or no sugar added soymilk  Water     Fats: (use in moderation)  1 teaspoon of soft tub margarine  1 teaspoon olive oil, canola oil, or peanut oil  1 tablespoon of low-fat álvarez or salad dressing     Sample Menu for 18+ months after Gastric Bypass    You do NOT need to eat/drink the full portion sizes listed below  Always stop when you are satisfied    Breakfast   cup 1% cottage cheese     cup mixed berries   Lunch 2 oz lean roast beef on   [place] Thin with 1 tsp. light álvarez    small tomato, chopped, mixed with 1 tsp. light vinaigrette dressing   Supplement Approved protein supplement (if needed between meals)   Dinner 2 oz grilled salmon    cup salad greens with 1 tsp. light salad dressing and 1 tsp. ground flax seed    cup quinoa or brown rice     Breakfast   cup egg substitute with   cup sautéed chopped vegetables  2 light [place] Krisp crackers   Lunch Tuna Melt:   cup tuna mixed with 1 tsp. light álvarez over   [place] Thin. Top with 2-3 slices cucumber and 1 oz slice of low fat cheese   Supplement 1 cup skim milk (if needed between meals)   Dinner 3 oz  grilled, broiled, or baked seasoned skinless chicken breast    cup asparagus     Breakfast   cup plain oatmeal made with skim or 1% milk with 1 Tbsp. flavored/unflavored protein powder added  1 mozzarella string cheese   Lunch 2 oz deli turkey breast  1/3 cup salad with 1 tsp. light salad dressing, 1/8 of a whole avocado and 1 Tbsp. sunflower seeds   Dinner 3 oz. pork loin made in a crock pot, seasoned with a spice rub    cup cooked carrots   Supplement Approved protein supplement (if needed between meals)     Breakfast 1 cup breakfast casserole made with egg substitute, turkey sausage,  and steamed, chopped bell peppers   Supplement  1 cup light Greek yogurt (if needed between meals)   Lunch  2 oz. teriyaki turkey    cup mashed sweet potato with 1-2 spritzes of spray butter (like [place])    cup fresh pineapple   Dinner 3 oz low fat meatloaf    cup roasted garlic zucchini     Breakfast   cup leftover breakfast casserole    cup no sugar added applesauce with 1 Tbsp. unflavored protein powder and a sprinkle of cinnamon    Lunch 3 oz shrimp with 1-2 Tbsp. low-sugar cocktail sauce for dipping    c. whole wheat pasta drizzled with   tsp. olive oil   Supplement 1 cup skim/1% milk with scoop of protein powder (if needed between meals)   Dinner Grilled, seasoned kebob with 2 oz lean beef and   cup vegetables     Breakfast Breakfast pizza:   [place] Thin spread with 1 Tbsp. low sugar spaghetti sauce,   cup shredded low fat cheese, melted and 1 slice of [place] alvarez     cup fresh fruit mixed with chopped almonds   Lunch   cup black bean soup  4-5 whole grain crackers   Dinner 3 oz  tilapia with lemon pepper seasoning    cup stewed tomatoes   Supplement 1 string cheese (if needed between meals)     Breakfast 2 hard boiled eggs (discard 1 egg yolk)    whole wheat English Muffin with 1 tsp. low sugar jelly   Lunch   cup leftover black bean soup topped with 1-2 Tbsp. low fat cheese  2-3 light [place] Krisp crackers   Supplement Approved protein supplement (if needed between meals)   Dinner 3 oz sirloin steak    cup steamed broccoli

## 2022-04-21 NOTE — LETTER
4/21/2022         RE: Yuliana Armendariz  [address]  [facility] [address]        Dear Colleague,    Thank you for referring your patient, Yuliana Armendariz, to the [facility]. Please see a copy of my visit note below.    Bariatric Follow Up Visit with a History of Previous Bariatric Surgery     Date of visit: 4/21/2022  Physician: Dorothy Coronado MD, MD  Primary Care Provider:  Mayda Mccray  Yuliana Armendariz   61 year old  female    Date of Surgery: 4/23/2014  Initial Weight: 231# high 237#  Initial BMI: 38.9  Today's Weight:   Wt Readings from Last 1 Encounters:   04/21/22 101.2 kg (223 lb)     Body mass index is 37.11 kg/m .      Assessment and Plan     Assessment: Yuliana is a 61 year old year old female who is 8 yrs s/p  Jose Daniel en Y Gastric Bypass with Dr. Sanchez  Yuliana Armendariz feels as if she had achieved the goals she hoped to accomplish through bariatric surgery and weight loss.  She lost to 185# and has had weight regain to 223#. Maintaining a 8# weight loss.    Encounter Diagnoses   Name Primary?     Postoperative malabsorption Yes     Morbid obesity (H)      Esophageal dyskinesia          Current Outpatient Medications:      acetaminophen (TYLENOL) 500 MG tablet, Take 500-1,000 mg by mouth as needed, Disp: , Rfl:      ADVAIR -21 mcg/actuation inhaler, [ADVAIR -21 MCG/ACTUATION INHALER] Inhale 1 puff 2 (two) times a day., Disp: , Rfl: 11     albuterol (PROVENTIL HFA;VENTOLIN HFA) 90 mcg/actuation inhaler, [ALBUTEROL (PROVENTIL HFA;VENTOLIN HFA) 90 MCG/ACTUATION INHALER] Inhale 2 puffs every 6 (six) hours as needed for wheezing., Disp: , Rfl:      albuterol (PROVENTIL) 2.5 mg /3 mL (0.083 %) nebulizer solution, [ALBUTEROL (PROVENTIL) 2.5 MG /3 ML (0.083 %) NEBULIZER SOLUTION] Take 2.5 mg by nebulization every 6 (six) hours as needed for wheezing., Disp: , Rfl:      amLODIPine (NORVASC) 5 MG tablet, [AMLODIPINE  (NORVASC) 5 MG TABLET] Take 2.5 mg by mouth daily. , Disp: , Rfl:      atorvastatin (LIPITOR) 20 MG tablet, [ATORVASTATIN (LIPITOR) 20 MG TABLET] Take 20 mg by mouth bedtime., Disp: , Rfl:      CALCIUM CITRATE/VITAMIN D3 (CITRACAL + D PETITES ORAL), [CALCIUM CITRATE/VITAMIN D3 (CITRACAL + D PETITES ORAL)] Take 2 tablets by mouth 2 (two) times a day. , Disp: , Rfl:      carvedilol (COREG) 3.125 MG tablet, [CARVEDILOL (COREG) 3.125 MG TABLET] Take 3.125 mg by mouth bedtime. , Disp: , Rfl:      celecoxib (CELEBREX) 100 MG capsule, [CELECOXIB (CELEBREX) 100 MG CAPSULE] Take 1 capsule by mouth 2 (two) times a day., Disp: , Rfl:      cholecalciferol (VITAMIN D3) 125 mcg (5000 units) capsule, Take 1 capsule (125 mcg) by mouth 2 times daily, Disp: 180 capsule, Rfl: 3     clotrimazole (LOTRIMIN) 1 % cream, [CLOTRIMAZOLE (LOTRIMIN) 1 % CREAM] Apply 1 application topically 2 (two) times a day., Disp: , Rfl: 1     CONTOUR NEXT TEST STRIPS strips, [CONTOUR NEXT TEST STRIPS STRIPS] daily. TEST ONCE DAILY, Disp: , Rfl:      cyanocobalamin, vitamin B-12, 1,000 mcg Subl, [CYANOCOBALAMIN, VITAMIN B-12, 1,000 MCG SUBL] Place 1 tablet (1,000 mcg total) under the tongue daily., Disp: 90 tablet, Rfl: prn     diltiazem (CARDIZEM) 60 MG tablet, Take 1 tablet by mouth 4 times daily, Disp: , Rfl:      fluocinonide (LIDEX) 0.05 % cream, [FLUOCINONIDE (LIDEX) 0.05 % CREAM] Apply 1 application topically 2 (two) times a day., Disp: , Rfl:      hydrocortisone 2.5 % cream, [HYDROCORTISONE 2.5 % CREAM] Apply 1 application topically as needed., Disp: , Rfl:      levETIRAcetam (KEPPRA) 1000 MG tablet, [LEVETIRACETAM (KEPPRA) 1000 MG TABLET] Take 1 tablet by mouth 2 (two) times a day., Disp: , Rfl: 3     losartan (COZAAR) 50 MG tablet, [LOSARTAN (COZAAR) 50 MG TABLET] Take 50 mg by mouth daily. Two tabs daily, Disp: , Rfl:      miconazole (MICOTIN) 2 % powder, [MICONAZOLE (MICOTIN) 2 % POWDER] Apply 1 application topically as needed for itching (for  "rash under breasts)., Disp: , Rfl:      montelukast (SINGULAIR) 10 mg tablet, [MONTELUKAST (SINGULAIR) 10 MG TABLET] Take 10 mg by mouth bedtime., Disp: , Rfl:      omeprazole (PRILOSEC) 20 MG capsule, Take 20 mg by mouth 2 times daily, Disp: , Rfl:      pediatric multivit-iron-min (CEROVITE JR) Chew, [PEDIATRIC MULTIVIT-IRON-MIN (CEROVITE JR) CHEW] Chew 1 tablet 2 (two) times a day., Disp: 180 each, Rfl: 3     phentermine (ADIPEX-P) 37.5 MG tablet, TAKE ONE-HALF TO ONE TABLET BY MOUTH ONE TIME DAILY BEFORE BREAKFAST, Disp: 90 tablet, Rfl: 1     QUEtiapine (SEROQUEL) 25 MG tablet, [QUETIAPINE (SEROQUEL) 25 MG TABLET] Take 25 mg by mouth at bedtime., Disp: , Rfl:      SF 5000 PLUS 1.1 % Crea, [SF 5000 PLUS 1.1 % CREA] Apply 1 application topically as needed., Disp: , Rfl: 0     SYMBICORT 80-4.5 MCG/ACT Inhaler, INHALE 2 PUFFS BY MOUTH TWICE DAILY. RINSE MOUTH /. GARGLE AFTER USE, Disp: , Rfl:      traZODone (DESYREL) 50 MG tablet, [TRAZODONE (DESYREL) 50 MG TABLET] at bedtime as needed., Disp: , Rfl:      venlafaxine (EFFEXOR-XR) 150 MG 24 hr capsule, [VENLAFAXINE (EFFEXOR-XR) 150 MG 24 HR CAPSULE] Take 1 capsule by mouth daily., Disp: , Rfl: 11     Plan:Annual labs have been ordered. Working with the dietitian after current GI work up would be helpful. If esophageal spasm continues could consider hyoscyamine, nitroglycerine, or increasing Ca++ channel blocker she is taking. Weight loss can help decrease GERD symptoms.     Return in about 6 months (around 10/21/2022).    Bariatric Surgery Review     Interim History/LifeChanges: Swallowing problems, working with [facility]. Has an EGD and BOTOX scheduled. Esophageal Dyskinesia-she describes as a spasm. Had lost to 185# and now regained 38# to 223# maintains an 8# weight loss. States she has not appetite and does not eat.     Patient Concerns: spasm of esophagus  Appetite (1-10): \"I don't have an appetite.\"  GERD: feels like it but studies say no    Medication changes: " stopped omeprazole    Vitamin Intake:   B-12   SL   MVI  chewables 2/d   Vitamin D  5,000 bid   Calcium   citrate     Other                LABS: ordered    Nausea no  Vomiting occ  Constipation a little  Diarrhea no  Rashes yes  Hair Loss no  Calf tenderness no  Breathing difficulty no  Reactive Hypoglycemia yes  Light Headedness no   Moods depressed d/t multiple family deaths    12 point ROS as above and otherwise negative      Habits:  Alcohol: tierra's on ice  Tobacco: no  Caffeine 1/2 c coffee  NSAIDS no  Exercise Routine: walking out with her dog  3 meals/day B: glucerna. Skips lunch.   Protein first no  ?grams/day  Water Separate from meals yes  Calorie Containing Beverages no  Restaurant eating/wk no  Sleeping 5 hours  Stress high  CPAP: NA  Contraception: PM  DEXA:due 2023 2020 DEXA showed LBM    Social History     Social History     Socioeconomic History     Marital status: Single     Spouse name: Not on file     Number of children: Not on file     Years of education: Not on file     Highest education level: Not on file   Occupational History     Not on file   Tobacco Use     Smoking status: Former Smoker     Smokeless tobacco: Never Used     Tobacco comment: quit over 25 years ago   Substance and Sexual Activity     Alcohol use: Yes     Comment: Alcoholic Drinks/day: Rarely     Drug use: No     Sexual activity: Not Currently     Partners: Male     Birth control/protection: Surgical   Other Topics Concern     Not on file   Social History Narrative     Not on file     Social Determinants of Health     Financial Resource Strain: Not on file   Food Insecurity: Not on file   Transportation Needs: Not on file   Physical Activity: Not on file   Stress: Not on file   Social Connections: Not on file   Intimate Partner Violence: Not At Risk     Fear of Current or Ex-Partner: No     Emotionally Abused: No     Physically Abused: No     Sexually Abused: No   Housing Stability: Not on file       Past Medical History      Past Medical History:   Diagnosis Date     Asthma      Back pain      Congestive heart failure (H)      COPD (chronic obstructive pulmonary disease) (H)      Degenerative disc disease      Diabetes type 2, controlled (H)      Dyslipidemia      Esophageal dyskinesia      GERD (gastroesophageal reflux disease)      Gout      Hyperparathyroidism (H)      Hyperparathyroidism, secondary (H)      Hypertension      Low bone mass      Metabolic syndrome      Morbid obesity (H)      Osteoarthritis      Reflux      Problem List     Patient Active Problem List   Diagnosis     Congestive heart failure, unspecified HF chronicity, unspecified heart failure type (H)     Esophageal dyskinesia     Non-cardiac chest pain     Regurgitation of food     Esophageal dysphagia     Morbid obesity (H)     Medications       Current Outpatient Medications:      acetaminophen (TYLENOL) 500 MG tablet, Take 500-1,000 mg by mouth as needed, Disp: , Rfl:      ADVAIR -21 mcg/actuation inhaler, [ADVAIR -21 MCG/ACTUATION INHALER] Inhale 1 puff 2 (two) times a day., Disp: , Rfl: 11     albuterol (PROVENTIL HFA;VENTOLIN HFA) 90 mcg/actuation inhaler, [ALBUTEROL (PROVENTIL HFA;VENTOLIN HFA) 90 MCG/ACTUATION INHALER] Inhale 2 puffs every 6 (six) hours as needed for wheezing., Disp: , Rfl:      albuterol (PROVENTIL) 2.5 mg /3 mL (0.083 %) nebulizer solution, [ALBUTEROL (PROVENTIL) 2.5 MG /3 ML (0.083 %) NEBULIZER SOLUTION] Take 2.5 mg by nebulization every 6 (six) hours as needed for wheezing., Disp: , Rfl:      amLODIPine (NORVASC) 5 MG tablet, [AMLODIPINE (NORVASC) 5 MG TABLET] Take 2.5 mg by mouth daily. , Disp: , Rfl:      atorvastatin (LIPITOR) 20 MG tablet, [ATORVASTATIN (LIPITOR) 20 MG TABLET] Take 20 mg by mouth bedtime., Disp: , Rfl:      CALCIUM CITRATE/VITAMIN D3 (CITRACAL + D PETITES ORAL), [CALCIUM CITRATE/VITAMIN D3 (CITRACAL + D PETITES ORAL)] Take 2 tablets by mouth 2 (two) times a day. , Disp: , Rfl:      carvedilol  (COREG) 3.125 MG tablet, [CARVEDILOL (COREG) 3.125 MG TABLET] Take 3.125 mg by mouth bedtime. , Disp: , Rfl:      celecoxib (CELEBREX) 100 MG capsule, [CELECOXIB (CELEBREX) 100 MG CAPSULE] Take 1 capsule by mouth 2 (two) times a day., Disp: , Rfl:      cholecalciferol (VITAMIN D3) 125 mcg (5000 units) capsule, Take 1 capsule (125 mcg) by mouth 2 times daily, Disp: 180 capsule, Rfl: 3     clotrimazole (LOTRIMIN) 1 % cream, [CLOTRIMAZOLE (LOTRIMIN) 1 % CREAM] Apply 1 application topically 2 (two) times a day., Disp: , Rfl: 1     CONTOUR NEXT TEST STRIPS strips, [CONTOUR NEXT TEST STRIPS STRIPS] daily. TEST ONCE DAILY, Disp: , Rfl:      cyanocobalamin, vitamin B-12, 1,000 mcg Subl, [CYANOCOBALAMIN, VITAMIN B-12, 1,000 MCG SUBL] Place 1 tablet (1,000 mcg total) under the tongue daily., Disp: 90 tablet, Rfl: prn     diltiazem (CARDIZEM) 60 MG tablet, Take 1 tablet by mouth 4 times daily, Disp: , Rfl:      fluocinonide (LIDEX) 0.05 % cream, [FLUOCINONIDE (LIDEX) 0.05 % CREAM] Apply 1 application topically 2 (two) times a day., Disp: , Rfl:      hydrocortisone 2.5 % cream, [HYDROCORTISONE 2.5 % CREAM] Apply 1 application topically as needed., Disp: , Rfl:      levETIRAcetam (KEPPRA) 1000 MG tablet, [LEVETIRACETAM (KEPPRA) 1000 MG TABLET] Take 1 tablet by mouth 2 (two) times a day., Disp: , Rfl: 3     losartan (COZAAR) 50 MG tablet, [LOSARTAN (COZAAR) 50 MG TABLET] Take 50 mg by mouth daily. Two tabs daily, Disp: , Rfl:      miconazole (MICOTIN) 2 % powder, [MICONAZOLE (MICOTIN) 2 % POWDER] Apply 1 application topically as needed for itching (for rash under breasts)., Disp: , Rfl:      montelukast (SINGULAIR) 10 mg tablet, [MONTELUKAST (SINGULAIR) 10 MG TABLET] Take 10 mg by mouth bedtime., Disp: , Rfl:      omeprazole (PRILOSEC) 20 MG capsule, Take 20 mg by mouth 2 times daily, Disp: , Rfl:      pediatric multivit-iron-min (CEROVITE JR) Chew, [PEDIATRIC MULTIVIT-IRON-MIN (CEROVITE JR) CHEW] Chew 1 tablet 2 (two) times a  "day., Disp: 180 each, Rfl: 3     phentermine (ADIPEX-P) 37.5 MG tablet, TAKE ONE-HALF TO ONE TABLET BY MOUTH ONE TIME DAILY BEFORE BREAKFAST, Disp: 90 tablet, Rfl: 1     QUEtiapine (SEROQUEL) 25 MG tablet, [QUETIAPINE (SEROQUEL) 25 MG TABLET] Take 25 mg by mouth at bedtime., Disp: , Rfl:      SF 5000 PLUS 1.1 % Crea, [SF 5000 PLUS 1.1 % CREA] Apply 1 application topically as needed., Disp: , Rfl: 0     SYMBICORT 80-4.5 MCG/ACT Inhaler, INHALE 2 PUFFS BY MOUTH TWICE DAILY. RINSE MOUTH /. GARGLE AFTER USE, Disp: , Rfl:      traZODone (DESYREL) 50 MG tablet, [TRAZODONE (DESYREL) 50 MG TABLET] at bedtime as needed., Disp: , Rfl:      venlafaxine (EFFEXOR-XR) 150 MG 24 hr capsule, [VENLAFAXINE (EFFEXOR-XR) 150 MG 24 HR CAPSULE] Take 1 capsule by mouth daily., Disp: , Rfl: 11   Surgical History     Past Surgical History  She has a past surgical history that includes Revision Jose Daniel-En-Y (4/23/2014); Cholecystectomy; Hysterectomy; tubal ligation; Total Knee Arthroplasty (Left, 03/2019); and Arthroscopy knee with meniscal repair (Left, 01/2019).    Objective-Exam     Constitutional:  /80   Ht 1.651 m (5' 5\")   Wt 101.2 kg (223 lb)   BMI 37.11 kg/m      General:  Pleasant and in no acute distress   Eyes:  EOMI  ENT:  Airway 2+  Moist mucous membranes  Neck:  Supple, No LAD, No thyromegaly, No carotid bruits appreciated  Respiratory: Normal respiratory effort, no cough, wheezes or crackles  CV:  Regular rate and Rhythm,no murmurs, pulses 2+, no calf tenderness, trace LE edema  Gastrointestinal: Abdomen NT/ND, BS+  Musculoskeletal: muscle mass WNL  Skin: color brown hair full, incisions nicely healed  Neurological: No tremor, normal gait  Psychiatric: alert and oriented X3, mood and affect normal    Counseling     We reviewed the important post op bariatric recommendations:  -eating 3 meals daily  -eating protein first, getting >60gm protein daily  -eating slowly, chewing food well  -avoiding/limiting calorie containing " beverages  -drinking water 15-30 minutes before or after meals  -choosing wheat, not white with breads, crackers, pastas, micheal, bagels, tortillas, rice  -limiting restaurant or cafeteria eating to twice a week or less    We discussed the importance of restorative sleep and stress management in maintaining a healthy weight.  We discussed the National Weight Control Registry healthy weight maintenance strategies and ways to optimize metabolism.  We discussed the importance of physical activity including cardiovascular and strength training in maintaining a healthier weight.    We discussed the importance of life-long vitamin supplementation and life-long  follow-up.    Yuliana was reminded that, to avoid marginal ulcers she should avoid tobacco at all, alcohol in excess, caffeine in excess, and NSAIDS (unless indicated for cardioprotection or othewise and opposed by a PPI).    Dorothy Coronado MD, MD, [facility].  4/21/2022  11:33 AM      No images are attached to the encounter.  Total time spent on the date of this encounter doing: chart review, review of test results, patient visit, physical exam, education, counseling, developing plan of care, and documenting = 30 minutes.      Again, thank you for allowing me to participate in the care of your patient.        Sincerely,        Dorothy Coronado MD

## 2022-04-21 NOTE — PROGRESS NOTES
Bariatric Follow Up Visit with a History of Previous Bariatric Surgery     Date of visit: 4/21/2022  Physician: Dorothy Coronado MD, MD  Primary Care Provider:  Mayda Mccray SATHISH Armendariz   61 year old  female    Date of Surgery: 4/23/2014  Initial Weight: 231# high 237#  Initial BMI: 38.9  Today's Weight:   Wt Readings from Last 1 Encounters:   04/21/22 101.2 kg (223 lb)     Body mass index is 37.11 kg/m .      Assessment and Plan     Assessment: Yuliana is a 61 year old year old female who is 8 yrs s/p  Jose Daniel en Y Gastric Bypass with Dr. Laura Bridges SATHISH Marcello feels as if she had achieved the goals she hoped to accomplish through bariatric surgery and weight loss.  She lost to 185# and has had weight regain to 223#. Maintaining a 8# weight loss.    Encounter Diagnoses   Name Primary?     Postoperative malabsorption Yes     Morbid obesity (H)      Esophageal dyskinesia          Current Outpatient Medications:      acetaminophen (TYLENOL) 500 MG tablet, Take 500-1,000 mg by mouth as needed, Disp: , Rfl:      ADVAIR -21 mcg/actuation inhaler, [ADVAIR -21 MCG/ACTUATION INHALER] Inhale 1 puff 2 (two) times a day., Disp: , Rfl: 11     albuterol (PROVENTIL HFA;VENTOLIN HFA) 90 mcg/actuation inhaler, [ALBUTEROL (PROVENTIL HFA;VENTOLIN HFA) 90 MCG/ACTUATION INHALER] Inhale 2 puffs every 6 (six) hours as needed for wheezing., Disp: , Rfl:      albuterol (PROVENTIL) 2.5 mg /3 mL (0.083 %) nebulizer solution, [ALBUTEROL (PROVENTIL) 2.5 MG /3 ML (0.083 %) NEBULIZER SOLUTION] Take 2.5 mg by nebulization every 6 (six) hours as needed for wheezing., Disp: , Rfl:      amLODIPine (NORVASC) 5 MG tablet, [AMLODIPINE (NORVASC) 5 MG TABLET] Take 2.5 mg by mouth daily. , Disp: , Rfl:      atorvastatin (LIPITOR) 20 MG tablet, [ATORVASTATIN (LIPITOR) 20 MG TABLET] Take 20 mg by mouth bedtime., Disp: , Rfl:      CALCIUM CITRATE/VITAMIN D3 (CITRACAL + D PETITES ORAL), [CALCIUM CITRATE/VITAMIN D3 (CITRACAL + D  PETITES ORAL)] Take 2 tablets by mouth 2 (two) times a day. , Disp: , Rfl:      carvedilol (COREG) 3.125 MG tablet, [CARVEDILOL (COREG) 3.125 MG TABLET] Take 3.125 mg by mouth bedtime. , Disp: , Rfl:      celecoxib (CELEBREX) 100 MG capsule, [CELECOXIB (CELEBREX) 100 MG CAPSULE] Take 1 capsule by mouth 2 (two) times a day., Disp: , Rfl:      cholecalciferol (VITAMIN D3) 125 mcg (5000 units) capsule, Take 1 capsule (125 mcg) by mouth 2 times daily, Disp: 180 capsule, Rfl: 3     clotrimazole (LOTRIMIN) 1 % cream, [CLOTRIMAZOLE (LOTRIMIN) 1 % CREAM] Apply 1 application topically 2 (two) times a day., Disp: , Rfl: 1     CONTOUR NEXT TEST STRIPS strips, [CONTOUR NEXT TEST STRIPS STRIPS] daily. TEST ONCE DAILY, Disp: , Rfl:      cyanocobalamin, vitamin B-12, 1,000 mcg Subl, [CYANOCOBALAMIN, VITAMIN B-12, 1,000 MCG SUBL] Place 1 tablet (1,000 mcg total) under the tongue daily., Disp: 90 tablet, Rfl: prn     diltiazem (CARDIZEM) 60 MG tablet, Take 1 tablet by mouth 4 times daily, Disp: , Rfl:      fluocinonide (LIDEX) 0.05 % cream, [FLUOCINONIDE (LIDEX) 0.05 % CREAM] Apply 1 application topically 2 (two) times a day., Disp: , Rfl:      hydrocortisone 2.5 % cream, [HYDROCORTISONE 2.5 % CREAM] Apply 1 application topically as needed., Disp: , Rfl:      levETIRAcetam (KEPPRA) 1000 MG tablet, [LEVETIRACETAM (KEPPRA) 1000 MG TABLET] Take 1 tablet by mouth 2 (two) times a day., Disp: , Rfl: 3     losartan (COZAAR) 50 MG tablet, [LOSARTAN (COZAAR) 50 MG TABLET] Take 50 mg by mouth daily. Two tabs daily, Disp: , Rfl:      miconazole (MICOTIN) 2 % powder, [MICONAZOLE (MICOTIN) 2 % POWDER] Apply 1 application topically as needed for itching (for rash under breasts)., Disp: , Rfl:      montelukast (SINGULAIR) 10 mg tablet, [MONTELUKAST (SINGULAIR) 10 MG TABLET] Take 10 mg by mouth bedtime., Disp: , Rfl:      omeprazole (PRILOSEC) 20 MG capsule, Take 20 mg by mouth 2 times daily, Disp: , Rfl:      pediatric multivit-iron-min (CEROVITE  "JR) Chew, [PEDIATRIC MULTIVIT-IRON-MIN (CEROVITE JR) CHEW] Chew 1 tablet 2 (two) times a day., Disp: 180 each, Rfl: 3     phentermine (ADIPEX-P) 37.5 MG tablet, TAKE ONE-HALF TO ONE TABLET BY MOUTH ONE TIME DAILY BEFORE BREAKFAST, Disp: 90 tablet, Rfl: 1     QUEtiapine (SEROQUEL) 25 MG tablet, [QUETIAPINE (SEROQUEL) 25 MG TABLET] Take 25 mg by mouth at bedtime., Disp: , Rfl:      SF 5000 PLUS 1.1 % Crea, [SF 5000 PLUS 1.1 % CREA] Apply 1 application topically as needed., Disp: , Rfl: 0     SYMBICORT 80-4.5 MCG/ACT Inhaler, INHALE 2 PUFFS BY MOUTH TWICE DAILY. RINSE MOUTH /. GARGLE AFTER USE, Disp: , Rfl:      traZODone (DESYREL) 50 MG tablet, [TRAZODONE (DESYREL) 50 MG TABLET] at bedtime as needed., Disp: , Rfl:      venlafaxine (EFFEXOR-XR) 150 MG 24 hr capsule, [VENLAFAXINE (EFFEXOR-XR) 150 MG 24 HR CAPSULE] Take 1 capsule by mouth daily., Disp: , Rfl: 11     Plan:Annual labs have been ordered. Working with the dietitian after current GI work up would be helpful. If esophageal spasm continues could consider hyoscyamine, nitroglycerine, or increasing Ca++ channel blocker she is taking. Weight loss can help decrease GERD symptoms.     Return in about 6 months (around 10/21/2022).    Bariatric Surgery Review     Interim History/LifeChanges: Swallowing problems, working with [facility]. Has an EGD and BOTOX scheduled. Esophageal Dyskinesia-she describes as a spasm. Had lost to 185# and now regained 38# to 223# maintains an 8# weight loss. States she has not appetite and does not eat.     Patient Concerns: spasm of esophagus  Appetite (1-10): \"I don't have an appetite.\"  GERD: feels like it but studies say no    Medication changes: stopped omeprazole    Vitamin Intake:   B-12   SL   MVI  chewables 2/d   Vitamin D  5,000 bid   Calcium   citrate     Other                LABS: ordered    Nausea no  Vomiting occ  Constipation a little  Diarrhea no  Rashes yes  Hair Loss no  Calf tenderness no  Breathing difficulty no  Reactive " Hypoglycemia yes  Light Headedness no   Moods depressed d/t multiple family deaths    12 point ROS as above and otherwise negative      Habits:  Alcohol: tierra's on ice  Tobacco: no  Caffeine 1/2 c coffee  NSAIDS no  Exercise Routine: walking out with her dog  3 meals/day B: glucerna. Skips lunch.   Protein first no  ?grams/day  Water Separate from meals yes  Calorie Containing Beverages no  Restaurant eating/wk no  Sleeping 5 hours  Stress high  CPAP: NA  Contraception: PM  DEXA:due 2023 2020 DEXA showed LBM    Social History     Social History     Socioeconomic History     Marital status: Single     Spouse name: Not on file     Number of children: Not on file     Years of education: Not on file     Highest education level: Not on file   Occupational History     Not on file   Tobacco Use     Smoking status: Former Smoker     Smokeless tobacco: Never Used     Tobacco comment: quit over 25 years ago   Substance and Sexual Activity     Alcohol use: Yes     Comment: Alcoholic Drinks/day: Rarely     Drug use: No     Sexual activity: Not Currently     Partners: Male     Birth control/protection: Surgical   Other Topics Concern     Not on file   Social History Narrative     Not on file     Social Determinants of Health     Financial Resource Strain: Not on file   Food Insecurity: Not on file   Transportation Needs: Not on file   Physical Activity: Not on file   Stress: Not on file   Social Connections: Not on file   Intimate Partner Violence: Not At Risk     Fear of Current or Ex-Partner: No     Emotionally Abused: No     Physically Abused: No     Sexually Abused: No   Housing Stability: Not on file       Past Medical History     Past Medical History:   Diagnosis Date     Asthma      Back pain      Congestive heart failure (H)      COPD (chronic obstructive pulmonary disease) (H)      Degenerative disc disease      Diabetes type 2, controlled (H)      Dyslipidemia      Esophageal dyskinesia      GERD (gastroesophageal  reflux disease)      Gout      Hyperparathyroidism (H)      Hyperparathyroidism, secondary (H)      Hypertension      Low bone mass      Metabolic syndrome      Morbid obesity (H)      Osteoarthritis      Reflux      Problem List     Patient Active Problem List   Diagnosis     Congestive heart failure, unspecified HF chronicity, unspecified heart failure type (H)     Esophageal dyskinesia     Non-cardiac chest pain     Regurgitation of food     Esophageal dysphagia     Morbid obesity (H)     Medications       Current Outpatient Medications:      acetaminophen (TYLENOL) 500 MG tablet, Take 500-1,000 mg by mouth as needed, Disp: , Rfl:      ADVAIR -21 mcg/actuation inhaler, [ADVAIR -21 MCG/ACTUATION INHALER] Inhale 1 puff 2 (two) times a day., Disp: , Rfl: 11     albuterol (PROVENTIL HFA;VENTOLIN HFA) 90 mcg/actuation inhaler, [ALBUTEROL (PROVENTIL HFA;VENTOLIN HFA) 90 MCG/ACTUATION INHALER] Inhale 2 puffs every 6 (six) hours as needed for wheezing., Disp: , Rfl:      albuterol (PROVENTIL) 2.5 mg /3 mL (0.083 %) nebulizer solution, [ALBUTEROL (PROVENTIL) 2.5 MG /3 ML (0.083 %) NEBULIZER SOLUTION] Take 2.5 mg by nebulization every 6 (six) hours as needed for wheezing., Disp: , Rfl:      amLODIPine (NORVASC) 5 MG tablet, [AMLODIPINE (NORVASC) 5 MG TABLET] Take 2.5 mg by mouth daily. , Disp: , Rfl:      atorvastatin (LIPITOR) 20 MG tablet, [ATORVASTATIN (LIPITOR) 20 MG TABLET] Take 20 mg by mouth bedtime., Disp: , Rfl:      CALCIUM CITRATE/VITAMIN D3 (CITRACAL + D PETITES ORAL), [CALCIUM CITRATE/VITAMIN D3 (CITRACAL + D PETITES ORAL)] Take 2 tablets by mouth 2 (two) times a day. , Disp: , Rfl:      carvedilol (COREG) 3.125 MG tablet, [CARVEDILOL (COREG) 3.125 MG TABLET] Take 3.125 mg by mouth bedtime. , Disp: , Rfl:      celecoxib (CELEBREX) 100 MG capsule, [CELECOXIB (CELEBREX) 100 MG CAPSULE] Take 1 capsule by mouth 2 (two) times a day., Disp: , Rfl:      cholecalciferol (VITAMIN D3) 125 mcg (5000 units)  capsule, Take 1 capsule (125 mcg) by mouth 2 times daily, Disp: 180 capsule, Rfl: 3     clotrimazole (LOTRIMIN) 1 % cream, [CLOTRIMAZOLE (LOTRIMIN) 1 % CREAM] Apply 1 application topically 2 (two) times a day., Disp: , Rfl: 1     CONTOUR NEXT TEST STRIPS strips, [CONTOUR NEXT TEST STRIPS STRIPS] daily. TEST ONCE DAILY, Disp: , Rfl:      cyanocobalamin, vitamin B-12, 1,000 mcg Subl, [CYANOCOBALAMIN, VITAMIN B-12, 1,000 MCG SUBL] Place 1 tablet (1,000 mcg total) under the tongue daily., Disp: 90 tablet, Rfl: prn     diltiazem (CARDIZEM) 60 MG tablet, Take 1 tablet by mouth 4 times daily, Disp: , Rfl:      fluocinonide (LIDEX) 0.05 % cream, [FLUOCINONIDE (LIDEX) 0.05 % CREAM] Apply 1 application topically 2 (two) times a day., Disp: , Rfl:      hydrocortisone 2.5 % cream, [HYDROCORTISONE 2.5 % CREAM] Apply 1 application topically as needed., Disp: , Rfl:      levETIRAcetam (KEPPRA) 1000 MG tablet, [LEVETIRACETAM (KEPPRA) 1000 MG TABLET] Take 1 tablet by mouth 2 (two) times a day., Disp: , Rfl: 3     losartan (COZAAR) 50 MG tablet, [LOSARTAN (COZAAR) 50 MG TABLET] Take 50 mg by mouth daily. Two tabs daily, Disp: , Rfl:      miconazole (MICOTIN) 2 % powder, [MICONAZOLE (MICOTIN) 2 % POWDER] Apply 1 application topically as needed for itching (for rash under breasts)., Disp: , Rfl:      montelukast (SINGULAIR) 10 mg tablet, [MONTELUKAST (SINGULAIR) 10 MG TABLET] Take 10 mg by mouth bedtime., Disp: , Rfl:      omeprazole (PRILOSEC) 20 MG capsule, Take 20 mg by mouth 2 times daily, Disp: , Rfl:      pediatric multivit-iron-min (CEROVITE JR) Chew, [PEDIATRIC MULTIVIT-IRON-MIN (CEROVITE JR) CHEW] Chew 1 tablet 2 (two) times a day., Disp: 180 each, Rfl: 3     phentermine (ADIPEX-P) 37.5 MG tablet, TAKE ONE-HALF TO ONE TABLET BY MOUTH ONE TIME DAILY BEFORE BREAKFAST, Disp: 90 tablet, Rfl: 1     QUEtiapine (SEROQUEL) 25 MG tablet, [QUETIAPINE (SEROQUEL) 25 MG TABLET] Take 25 mg by mouth at bedtime., Disp: , Rfl:      SF 5000  "PLUS 1.1 % Crea, [SF 5000 PLUS 1.1 % CREA] Apply 1 application topically as needed., Disp: , Rfl: 0     SYMBICORT 80-4.5 MCG/ACT Inhaler, INHALE 2 PUFFS BY MOUTH TWICE DAILY. RINSE MOUTH /. GARGLE AFTER USE, Disp: , Rfl:      traZODone (DESYREL) 50 MG tablet, [TRAZODONE (DESYREL) 50 MG TABLET] at bedtime as needed., Disp: , Rfl:      venlafaxine (EFFEXOR-XR) 150 MG 24 hr capsule, [VENLAFAXINE (EFFEXOR-XR) 150 MG 24 HR CAPSULE] Take 1 capsule by mouth daily., Disp: , Rfl: 11   Surgical History     Past Surgical History  She has a past surgical history that includes Revision Jose Daniel-En-Y (4/23/2014); Cholecystectomy; Hysterectomy; tubal ligation; Total Knee Arthroplasty (Left, 03/2019); and Arthroscopy knee with meniscal repair (Left, 01/2019).    Objective-Exam     Constitutional:  /80   Ht 1.651 m (5' 5\")   Wt 101.2 kg (223 lb)   BMI 37.11 kg/m      General:  Pleasant and in no acute distress   Eyes:  EOMI  ENT:  Airway 2+  Moist mucous membranes  Neck:  Supple, No LAD, No thyromegaly, No carotid bruits appreciated  Respiratory: Normal respiratory effort, no cough, wheezes or crackles  CV:  Regular rate and Rhythm,no murmurs, pulses 2+, no calf tenderness, trace LE edema  Gastrointestinal: Abdomen NT/ND, BS+  Musculoskeletal: muscle mass WNL  Skin: color brown hair full, incisions nicely healed  Neurological: No tremor, normal gait  Psychiatric: alert and oriented X3, mood and affect normal    Counseling     We reviewed the important post op bariatric recommendations:  -eating 3 meals daily  -eating protein first, getting >60gm protein daily  -eating slowly, chewing food well  -avoiding/limiting calorie containing beverages  -drinking water 15-30 minutes before or after meals  -choosing wheat, not white with breads, crackers, pastas, micheal, bagels, tortillas, rice  -limiting restaurant or cafeteria eating to twice a week or less    We discussed the importance of restorative sleep and stress management in " maintaining a healthy weight.  We discussed the National Weight Control Registry healthy weight maintenance strategies and ways to optimize metabolism.  We discussed the importance of physical activity including cardiovascular and strength training in maintaining a healthier weight.    We discussed the importance of life-long vitamin supplementation and life-long  follow-up.    Yuliana was reminded that, to avoid marginal ulcers she should avoid tobacco at all, alcohol in excess, caffeine in excess, and NSAIDS (unless indicated for cardioprotection or othewise and opposed by a PPI).    Dorothy Coronado MD, MD, [facility].  4/21/2022  11:33 AM      No images are attached to the encounter.  Total time spent on the date of this encounter doing: chart review, review of test results, patient visit, physical exam, education, counseling, developing plan of care, and documenting = 30 minutes.

## 2022-04-22 ENCOUNTER — LAB (OUTPATIENT)
Dept: FAMILY MEDICINE | Facility: CLINIC | Age: 62
End: 2022-04-22
Attending: INTERNAL MEDICINE
Payer: COMMERCIAL

## 2022-04-22 DIAGNOSIS — Z11.59 ENCOUNTER FOR SCREENING FOR OTHER VIRAL DISEASES: ICD-10-CM

## 2022-04-22 LAB — SARS-COV-2 RNA RESP QL NAA+PROBE: NEGATIVE

## 2022-04-22 PROCEDURE — U0003 INFECTIOUS AGENT DETECTION BY NUCLEIC ACID (DNA OR RNA); SEVERE ACUTE RESPIRATORY SYNDROME CORONAVIRUS 2 (SARS-COV-2) (CORONAVIRUS DISEASE [COVID-19]), AMPLIFIED PROBE TECHNIQUE, MAKING USE OF HIGH THROUGHPUT TECHNOLOGIES AS DESCRIBED BY CMS-2020-01-R: HCPCS

## 2022-04-22 PROCEDURE — U0005 INFEC AGEN DETEC AMPLI PROBE: HCPCS

## 2022-04-24 LAB
ANNOTATION COMMENT IMP: NORMAL
RETINYL PALMITATE SERPL-MCNC: 0.04 MG/L
VIT A SERPL-MCNC: 0.92 MG/L
ZINC SERPL-MCNC: 87.1 UG/DL

## 2022-04-25 ENCOUNTER — ANESTHESIA EVENT (OUTPATIENT)
Dept: GASTROENTEROLOGY | Facility: CLINIC | Age: 62
End: 2022-04-25
Payer: COMMERCIAL

## 2022-04-25 ASSESSMENT — COPD QUESTIONNAIRES: COPD: 1

## 2022-04-25 NOTE — ANESTHESIA PREPROCEDURE EVALUATION
Anesthesia Pre-Procedure Evaluation    Patient: Yuliana Armendariz   MRN: [id number] : 1960        Procedure : Procedure(s):  ESOPHAGOGASTRODUODENOSCOPY (EGD)  ESOPHAGEAL BALLOON PROVOCATION STUDY          Past Medical History:   Diagnosis Date     Asthma      Back pain      Congestive heart failure (H)      COPD (chronic obstructive pulmonary disease) (H)      Degenerative disc disease      Diabetes type 2, controlled (H)      Dyslipidemia      Esophageal dyskinesia      GERD (gastroesophageal reflux disease)      Gout      Hyperparathyroidism (H)      Hyperparathyroidism, secondary (H)      Hypertension      Low bone mass      Metabolic syndrome      Morbid obesity (H)      Osteoarthritis      Reflux       Past Surgical History:   Procedure Laterality Date     ARTHROSCOPY KNEE WITH MENISCAL REPAIR Left 2019     CHOLECYSTECTOMY       HYSTERECTOMY       REVISION JOSE DANIEL-EN-Y  2014    Laura     TOTAL KNEE ARTHROPLASTY Left 2019     TUBAL LIGATION        Allergies   Allergen Reactions     Aspirin Other (See Comments)     Hx gastric bypass     Hydrocodone Other (See Comments)     Itching     Latex Unknown     Itchy     Vicodin [Hydrocodone-Acetaminophen] Unknown     Itchy      Social History     Tobacco Use     Smoking status: Former Smoker     Smokeless tobacco: Never Used     Tobacco comment: quit over 25 years ago   Substance Use Topics     Alcohol use: Yes     Comment: Alcoholic Drinks/day: Rarely      Wt Readings from Last 1 Encounters:   22 101.2 kg (223 lb)        Anesthesia Evaluation            ROS/MED HX  ENT/Pulmonary:     (+) sleep apnea, uses CPAP, asthma COPD,     Neurologic:       Cardiovascular:     (+) hypertension-----CHF     METS/Exercise Tolerance:     Hematologic:       Musculoskeletal:       GI/Hepatic: Comment: s/p Jose Daniel en Y Gastric Bypass in   Esophageal dyskinesia    (+) GERD,     Renal/Genitourinary:       Endo:     (+) type II DM, Obesity,     Psychiatric/Substance Use:        Infectious Disease:       Malignancy:       Other:               OUTSIDE LABS:  CBC:   Lab Results   Component Value Date    WBC 9.4 04/21/2022    WBC 9.9 09/23/2020    HGB 12.5 04/21/2022    HGB 11.7 (L) 09/23/2020    HCT 38.1 04/21/2022    HCT 36.8 09/23/2020     04/21/2022     09/23/2020     BMP:   Lab Results   Component Value Date     04/21/2022     03/08/2021    POTASSIUM 3.6 04/21/2022    POTASSIUM 3.8 03/08/2021    CHLORIDE 100 04/21/2022    CHLORIDE 104 03/08/2021    CO2 27 04/21/2022    CO2 33 (H) 03/08/2021    BUN 8 04/21/2022    BUN 8 03/08/2021    CR 0.81 04/21/2022    CR 0.83 03/08/2021    GLC 94 04/21/2022    GLC 96 03/08/2021     COAGS: No results found for: PTT, INR, FIBR  POC: No results found for: BGM, HCG, HCGS  HEPATIC:   Lab Results   Component Value Date    ALBUMIN 4.2 04/21/2022    PROTTOTAL 7.5 04/21/2022    ALT 63 (H) 04/21/2022     (H) 04/21/2022    ALKPHOS 299 (H) 04/21/2022    BILITOTAL 0.7 04/21/2022     OTHER:   Lab Results   Component Value Date    A1C 5.9 (H) 04/21/2022    GEN 9.9 04/21/2022    TSH 16.79 (H) 04/21/2022       Anesthesia Plan    ASA Status:  3      Anesthesia Type: MAC.     - Reason for MAC: immobility needed   Induction: Intravenous.   Maintenance: TIVA.        Consents    Anesthesia Plan(s) and associated risks, benefits, and realistic alternatives discussed. Questions answered and patient/representative(s) expressed understanding.    - Discussed:     - Discussed with:  Patient      - Extended Intubation/Ventilatory Support Discussed: No.      - Patient is DNR/DNI Status: No    Use of blood products discussed: No .     Postoperative Care       PONV prophylaxis: Ondansetron (or other 5HT-3)     Comments:                Miguel Pina MD

## 2022-04-26 ENCOUNTER — ANESTHESIA (OUTPATIENT)
Dept: GASTROENTEROLOGY | Facility: CLINIC | Age: 62
End: 2022-04-26
Payer: COMMERCIAL

## 2022-04-26 ENCOUNTER — HOSPITAL ENCOUNTER (OUTPATIENT)
Facility: CLINIC | Age: 62
Discharge: HOME | End: 2022-04-26
Attending: INTERNAL MEDICINE | Admitting: INTERNAL MEDICINE
Payer: COMMERCIAL

## 2022-04-26 VITALS
WEIGHT: 223.77 LBS | HEART RATE: 77 BPM | BODY MASS INDEX: 37.28 KG/M2 | SYSTOLIC BLOOD PRESSURE: 128 MMHG | HEIGHT: 65 IN | DIASTOLIC BLOOD PRESSURE: 85 MMHG | TEMPERATURE: 97.6 F | OXYGEN SATURATION: 96 %

## 2022-04-26 LAB
GLUCOSE BLDC GLUCOMTR-MCNC: 100 MG/DL (ref 70–99)
UPPER GI ENDOSCOPY: NORMAL
VIT B1 PYROPHOSHATE BLD-SCNC: 196 NMOL/L

## 2022-04-26 PROCEDURE — 250N000020 HC RX IP 250 OP 636 J0585: Performed by: INTERNAL MEDICINE

## 2022-04-26 PROCEDURE — 43236 UPPR GI SCOPE W/SUBMUC INJ: CPT | Performed by: INTERNAL MEDICINE

## 2022-04-26 PROCEDURE — 250N000011 HC RX IP 250 OP 636: Performed by: NURSE ANESTHETIST, CERTIFIED REGISTERED

## 2022-04-26 PROCEDURE — 91040 ESOPH BALLOON DISTENSION TST: CPT | Performed by: INTERNAL MEDICINE

## 2022-04-26 PROCEDURE — 82962 GLUCOSE BLOOD TEST: CPT

## 2022-04-26 PROCEDURE — 43239 EGD BIOPSY SINGLE/MULTIPLE: CPT | Performed by: INTERNAL MEDICINE

## 2022-04-26 PROCEDURE — 88305 TISSUE EXAM BY PATHOLOGIST: CPT | Mod: TC | Performed by: INTERNAL MEDICINE

## 2022-04-26 PROCEDURE — 370N000017 HC ANESTHESIA TECHNICAL FEE, PER MIN: Performed by: INTERNAL MEDICINE

## 2022-04-26 PROCEDURE — 250N000009 HC RX 250: Performed by: NURSE ANESTHETIST, CERTIFIED REGISTERED

## 2022-04-26 PROCEDURE — 88305 TISSUE EXAM BY PATHOLOGIST: CPT | Mod: 26 | Performed by: PATHOLOGY

## 2022-04-26 PROCEDURE — 258N000003 HC RX IP 258 OP 636: Performed by: NURSE ANESTHETIST, CERTIFIED REGISTERED

## 2022-04-26 PROCEDURE — C1726 CATH, BAL DIL, NON-VASCULAR: HCPCS

## 2022-04-26 RX ORDER — MEPERIDINE HYDROCHLORIDE 25 MG/ML
12.5 INJECTION INTRAMUSCULAR; INTRAVENOUS; SUBCUTANEOUS
Status: CANCELLED | OUTPATIENT
Start: 2022-04-26

## 2022-04-26 RX ORDER — SODIUM CHLORIDE, SODIUM LACTATE, POTASSIUM CHLORIDE, CALCIUM CHLORIDE 600; 310; 30; 20 MG/100ML; MG/100ML; MG/100ML; MG/100ML
INJECTION, SOLUTION INTRAVENOUS CONTINUOUS
Status: CANCELLED | OUTPATIENT
Start: 2022-04-26

## 2022-04-26 RX ORDER — NALOXONE HYDROCHLORIDE 0.4 MG/ML
0.4 INJECTION, SOLUTION INTRAMUSCULAR; INTRAVENOUS; SUBCUTANEOUS
Status: CANCELLED | OUTPATIENT
Start: 2022-04-26

## 2022-04-26 RX ORDER — ONDANSETRON 4 MG/1
4 TABLET, ORALLY DISINTEGRATING ORAL EVERY 6 HOURS PRN
Status: CANCELLED | OUTPATIENT
Start: 2022-04-26

## 2022-04-26 RX ORDER — FLUMAZENIL 0.1 MG/ML
0.2 INJECTION, SOLUTION INTRAVENOUS
Status: CANCELLED | OUTPATIENT
Start: 2022-04-26 | End: 2022-04-27

## 2022-04-26 RX ORDER — ONDANSETRON 2 MG/ML
INJECTION INTRAMUSCULAR; INTRAVENOUS PRN
Status: DISCONTINUED | OUTPATIENT
Start: 2022-04-26 | End: 2022-04-26

## 2022-04-26 RX ORDER — SODIUM CHLORIDE 9 MG/ML
INJECTION, SOLUTION INTRAVENOUS CONTINUOUS PRN
Status: DISCONTINUED | OUTPATIENT
Start: 2022-04-26 | End: 2022-04-26

## 2022-04-26 RX ORDER — LIDOCAINE HYDROCHLORIDE 20 MG/ML
INJECTION, SOLUTION INFILTRATION; PERINEURAL PRN
Status: DISCONTINUED | OUTPATIENT
Start: 2022-04-26 | End: 2022-04-26

## 2022-04-26 RX ORDER — PROPOFOL 10 MG/ML
INJECTION, EMULSION INTRAVENOUS CONTINUOUS PRN
Status: DISCONTINUED | OUTPATIENT
Start: 2022-04-26 | End: 2022-04-26

## 2022-04-26 RX ORDER — ONDANSETRON 4 MG/1
4 TABLET, ORALLY DISINTEGRATING ORAL EVERY 30 MIN PRN
Status: CANCELLED | OUTPATIENT
Start: 2022-04-26

## 2022-04-26 RX ORDER — ONDANSETRON 2 MG/ML
4 INJECTION INTRAMUSCULAR; INTRAVENOUS EVERY 6 HOURS PRN
Status: CANCELLED | OUTPATIENT
Start: 2022-04-26

## 2022-04-26 RX ORDER — OXYCODONE HYDROCHLORIDE 5 MG/1
5 TABLET ORAL EVERY 4 HOURS PRN
Status: CANCELLED | OUTPATIENT
Start: 2022-04-26

## 2022-04-26 RX ORDER — NALOXONE HYDROCHLORIDE 0.4 MG/ML
0.2 INJECTION, SOLUTION INTRAMUSCULAR; INTRAVENOUS; SUBCUTANEOUS
Status: CANCELLED | OUTPATIENT
Start: 2022-04-26

## 2022-04-26 RX ORDER — PROPOFOL 10 MG/ML
INJECTION, EMULSION INTRAVENOUS PRN
Status: DISCONTINUED | OUTPATIENT
Start: 2022-04-26 | End: 2022-04-26

## 2022-04-26 RX ORDER — ONDANSETRON 2 MG/ML
4 INJECTION INTRAMUSCULAR; INTRAVENOUS
Status: DISCONTINUED | OUTPATIENT
Start: 2022-04-26 | End: 2022-04-26 | Stop reason: HOSPADM

## 2022-04-26 RX ORDER — LIDOCAINE 40 MG/G
CREAM TOPICAL
Status: DISCONTINUED | OUTPATIENT
Start: 2022-04-26 | End: 2022-04-26 | Stop reason: HOSPADM

## 2022-04-26 RX ORDER — PROCHLORPERAZINE MALEATE 10 MG
10 TABLET ORAL EVERY 6 HOURS PRN
Status: CANCELLED | OUTPATIENT
Start: 2022-04-26

## 2022-04-26 RX ORDER — ONDANSETRON 2 MG/ML
4 INJECTION INTRAMUSCULAR; INTRAVENOUS EVERY 30 MIN PRN
Status: CANCELLED | OUTPATIENT
Start: 2022-04-26

## 2022-04-26 RX ADMIN — TOPICAL ANESTHETIC 1 SPRAY: 200 SPRAY DENTAL; PERIODONTAL at 15:50

## 2022-04-26 RX ADMIN — ONDANSETRON 4 MG: 2 INJECTION INTRAMUSCULAR; INTRAVENOUS at 15:52

## 2022-04-26 RX ADMIN — PROPOFOL 30 MG: 10 INJECTION, EMULSION INTRAVENOUS at 16:14

## 2022-04-26 RX ADMIN — LIDOCAINE HYDROCHLORIDE 40 MG: 20 INJECTION, SOLUTION INFILTRATION; PERINEURAL at 15:50

## 2022-04-26 RX ADMIN — ONABOTULINUMTOXINA 100 UNITS: 100 INJECTION, POWDER, LYOPHILIZED, FOR SOLUTION INTRADERMAL; INTRAMUSCULAR at 16:25

## 2022-04-26 RX ADMIN — SODIUM CHLORIDE: 9 INJECTION, SOLUTION INTRAVENOUS at 15:50

## 2022-04-26 RX ADMIN — PROPOFOL 30 MG: 10 INJECTION, EMULSION INTRAVENOUS at 15:52

## 2022-04-26 RX ADMIN — PROPOFOL 150 MCG/KG/MIN: 10 INJECTION, EMULSION INTRAVENOUS at 15:54

## 2022-04-26 NOTE — OP NOTE
Please see endoscopy report for full description of the procedure.     EndoFLIP directed at the LES (16cm (EF-322) balloon length):   Date: 4/26/22    16cm balloon  Balloon inflation Balloon pressure CSA (mm^2) DI (mm^2/mmHg) Dmin (mm) Compliance   30 (ladmark ID) 36.1  9.78 21.2    40 66.2  8.58 26.9    50 77  8.84 29.4    60        70           Hypercontractile Светлана Lynch DO   of Medicine  Director, Esophageal Disorders Program  Division of Gastroenterology, Hepatology, and Nutrition  [facility]

## 2022-04-26 NOTE — ANESTHESIA CARE TRANSFER NOTE
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    Anesthesia Type:   MAC     Note:    Oropharynx: oropharynx clear of all foreign objects and spontaneously breathing  Level of Consciousness: awake  Oxygen Supplementation: room air    Independent Airway: airway patency satisfactory and stable  Dentition: dentition unchanged  Vital Signs Stable: post-procedure vital signs reviewed and stable  Report to RN Given: handoff report given  Patient transferred to: Phase II    Handoff Report: Identifed the Patient, Identified the Reponsible Provider, Reviewed the pertinent medical history, Discussed the surgical course, Reviewed Intra-OP anesthesia mangement and issues during anesthesia, Set expectations for post-procedure period and Allowed opportunity for questions and acknowledgement of understanding      Vitals:  Vitals Value Taken Time   /85 04/26/22 1636   Temp 36.4  C (97.6  F) 04/26/22 1636   Pulse 77 04/26/22 1636   Resp     SpO2 97 % 04/26/22 1638   Vitals shown include unvalidated device data.    Electronically Signed By: BRADLY Augustin CRNA  April 26, 2022  4:39 PM

## 2022-04-26 NOTE — INTERVAL H&P NOTE
"I have reviewed the surgical (or preoperative) H&P that is linked to this encounter, and examined the patient. There are no significant changes    Clinical Conditions Present on Arrival:  Clinically Significant Risk Factors Present on Admission              # Anion Gap Metabolic Acidosis: AG = N/A on admission, will monitor and treat as appropriate      # Obesity: Estimated body mass index is 37.24 kg/m  as calculated from the following:    Height as of this encounter: 1.651 m (5' 5\").    Weight as of this encounter: 101.5 kg (223 lb 12.3 oz).       "

## 2022-04-26 NOTE — H&P
Yuliana Armendariz  [id number]  female  61 year old      Reason for procedure/surgery: egd, FLIP, botox, dysphagia, non cardiac chest pain    Patient Active Problem List   Diagnosis     Congestive heart failure, unspecified HF chronicity, unspecified heart failure type (H)     Esophageal dyskinesia     Non-cardiac chest pain     Regurgitation of food     Esophageal dysphagia     Morbid obesity (H)       Past Surgical History:    Past Surgical History:   Procedure Laterality Date     ARTHROSCOPY KNEE WITH MENISCAL REPAIR Left 01/2019     CHOLECYSTECTOMY       HYSTERECTOMY       REVISION RIAN-EN-Y  4/23/2014    Laura     TOTAL KNEE ARTHROPLASTY Left 03/2019     TUBAL LIGATION         Past Medical History:   Past Medical History:   Diagnosis Date     Asthma      Back pain      Congestive heart failure (H)      COPD (chronic obstructive pulmonary disease) (H)      Degenerative disc disease      Diabetes type 2, controlled (H)      Dyslipidemia      Esophageal dyskinesia      GERD (gastroesophageal reflux disease)      Gout      Hyperparathyroidism (H)      Hyperparathyroidism, secondary (H)      Hypertension      Low bone mass      Metabolic syndrome      Morbid obesity (H)      Osteoarthritis      Reflux        Social History:   Social History     Tobacco Use     Smoking status: Former Smoker     Smokeless tobacco: Never Used     Tobacco comment: quit over 25 years ago   Substance Use Topics     Alcohol use: Yes     Comment: Alcoholic Drinks/day: Rarely       Family History:   Family History   Problem Relation Age of Onset     Diabetes Mother      Heart Disease Mother      Hyperlipidemia Mother      Hypertension Mother        Allergies:   Allergies   Allergen Reactions     Aspirin Other (See Comments)     Hx gastric bypass     Hydrocodone Other (See Comments)     Itching     Latex Unknown     Itchy     Vicodin [Hydrocodone-Acetaminophen] Unknown     Itchy       Active Medications:   No current outpatient medications on  "file.       Systemic Review:   CONSTITUTIONAL: NEGATIVE for fever, chills, change in weight  ENT/MOUTH: NEGATIVE for ear, mouth and throat problems  RESP: NEGATIVE for significant cough or SOB  CV: NEGATIVE for chest pain, palpitations or peripheral edema    Physical Examination:   Vital Signs: Ht 1.651 m (5' 5\")   Wt 101.5 kg (223 lb 12.3 oz)   BMI 37.24 kg/m    GENERAL: healthy, alert and no distress  NECK: no adenopathy, no asymmetry, masses, or scars  RESP: lungs clear to auscultation - no rales, rhonchi or wheezes  CV: regular rate and rhythm, normal S1 S2, no S3 or S4, no murmur, click or rub, no peripheral edema and peripheral pulses strong  ABDOMEN: soft, nontender, no hepatosplenomegaly, no masses and bowel sounds normal  MS: no gross musculoskeletal defects noted, no edema    Plan: Appropriate to proceed as scheduled.      Parminder Lynch DO  4/26/2022    PCP:  Mayda Mccray    "

## 2022-04-26 NOTE — ANESTHESIA POSTPROCEDURE EVALUATION
Patient: Yuliana Armendariz    Procedure: Procedure(s):  ESOPHAGOGASTRODUODENOSCOPY, WITH BIOPSY  ESOPHAGEAL BALLOON PROVOCATION STUDY  Esophagogastroduodenoscopy, With Botulinum Toxin Injection       Anesthesia Type:  MAC    Note:  Disposition: Outpatient   Postop Pain Control: Uneventful            Sign Out: Well controlled pain   PONV: No   Neuro/Psych: Uneventful            Sign Out: Acceptable/Baseline neuro status   Airway/Respiratory: Uneventful            Sign Out: Acceptable/Baseline resp. status   CV/Hemodynamics: Uneventful            Sign Out: Acceptable CV status; No obvious hypovolemia; No obvious fluid overload   Other NRE: NONE   DID A NON-ROUTINE EVENT OCCUR? No           Last vitals:  Vitals Value Taken Time   /85 04/26/22 1636   Temp 36.4  C (97.6  F) 04/26/22 1636   Pulse 77 04/26/22 1636   Resp     SpO2 85 % 04/26/22 1640   Vitals shown include unvalidated device data.    Electronically Signed By: Odessa Johnson MD  April 26, 2022  4:46 PM

## 2022-04-26 NOTE — LETTER
[facility] ENDOSCOPY  [address] [place] [address]  [phone]          April 18, 2022    Yuliana Armendariz                                                                                                                     [address]   [place] [address]            Dear Yuliana,      Instructions for Your Upper Endoscopy with EndoFlip  Your exam is on 4/26/22 Arrival Time: 12:45 PM   Please note that your procedure time may change  Check in at: [facility]; [address], [place] [address]    What is an upper endoscopy?  - This is an exam that checks for problems linked to heartburn, swallowing or belly (abdominal) pain or other symptoms of the upper GI tract. Depending on your symptoms, the doctor will look at your esophagus (food pipe), stomach and the part of the stomach that enters the small intestine.     What is EndoFLIP?  - This is a test where a balloon is inserted through your mouth at the time of your endoscopy. The balloon does not dilate or stretch the esophagus. It is used to make measurements of the esophagus. Depending on the balloon, it may also provide information on the esophageal motility--how well the esophagus works. If this test is being performed, it is because your physician feels that the information gained may help to uncover why you may be having symptoms or it can provide information useful to clinical care and management.    Getting ready  - You must arrange for an adult to drive you home after your exam.   - Your exam cannot be done unless you have proper transportation. If you need to use public transportation someone must ride with you.  - Dress in comfortable, loose clothing.  - Bring your insurance card. Leave your purse, billfold, credit cards and other valuables at home.  - Bring a list of your medicines and known allergies. If you have a pacemaker or ICD, please bring your information card.  - We do our best to  stay on time, but there may be a delay. Please bring something to pass the time, such as a newspaper or book.    - Important: You must complete all steps before the exam.     Seven days before the exam - Date: 4/19/22  - Talk to your doctor: If you take blood-thinners (such as Coumadin, Plavix, Xarelto), your prescription or schedule may need to change before the test.  - Continue taking prescribed aspirin; talk to your prescribing doctor with any concerns.  - Continue taking any acid reducing medications (Aciphex, Axid, cimetidine, esomeprazole, famotidine, Losec, metoclopramide, Nexium, nizatidine, omeprazole, pantoprazole, Pepcid, Prilosec, Propulsid, Protonix, rabeprazole, ranitidine, Reglan, Tagamet and Zantac) unless otherwise specified by your provider.    - If you have diabetes: Ask to have your exam early in the morning. Also, ask your doctor if you should change your diet or medicines    One day before the exam - Date: 4/25/22  -Stop eating all solid foods at 10 p.m. You may drink clear liquids.     Day of the exam - Date: 4/26/22  -You may drink clear liquids until 6 hours before your exam.  -You may take all of your morning medicines (except for diabetes pills) as usual with 4 oz. of water up to 4 hours before your test.  -If you take diabetes medicine (pills): do not take them the morning of your test.  - If possible, please discontinue your opioid medications (oxycodone, oxycontin, morphine, Percocet, Vicodin, dilaudid, hydromorphone, tramadol) 4 hours before the test.  -Bring a list of your medicines and known allergies.    -Please arrive with an adult who can take you home after the test: The medicine will make you sleepy. If you do not have a , we may cancel your test.    What are clear liquids?   You may have:  - Water, tea, coffee (no cream)  - Soda pop, Gatorade (not red or purple)  - Clear nutrition drinks (Enlive, Resource Breeze)   - Jell-O, Popsicles (no milk or fruit pieces) or sorbet  (not red or purple)  - Fat-free soup broth or bouillon  - Plain hard cand, such as clear life savers (not red or purple)  - Clear juices and fruit-flavored drinks such as apple juice, white grape juice, Hi-C and Jhon-Aid (not red or purple)   Do not have:  - Milk or milk products such as ice cream, malts or shakes  - Red or purple drinks of any kind such as cranberry juice or grape juice. Avoid red or purple Jell-O, Popsicles, Jhon-Aid, sorbet and candy  - Juices with pulp such as orange, grapefruit, pineapple or tomato juice  - Cream soups of any kind  - Alcohol       During the exam  - The exam lasts from 10 to 30 minutes.  - We will review the risks and benefits of the exam. We will then ask you to sign a consent form.  - We will place a small needle (IV) in your hand or arm. We will give you medicine through the IV to keep you comfortable.  - The anesthesia team will provide medications to make you sleep for the procedure.  - The endoscope will be advanced through your mouth and the exam completed.  - The EndoFLIP balloon will be inserted through your mouth and measurements will be obtained.  - The EndoFLIP balloon will be removed when measurements have been completed.  - We may take a small piece of tissue (a biopsy) to test in the lab. It will not hurt. We may also take pictures of your insides.  - Sometimes a dilation or stretching of the esophagus is performed.    After the exam  - You will rest in the recovery area until you feel ready to leave. This takes about 30 to 60 minutes.   - We will remove the IV.  - You may burp up air left in your stomach.  - You may feel drowsy or a little dizzy from the medicine.   - Your doctor will discuss your results.  - Your throat may feel numb. One hour after the exam, swallow a small amount of cool water. If you can swallow easily, you may go back to your regular diet and medicines.  - Your throat may be sore for the rest of the day. Throat lozenges or ice chips may  help.  - Do not drive for 24 hours.    - If a dilation (stretching) was performed, please do not eat or drink acidic foods or beverages for 24hrs as this can cause discomfort.    Call us at once if you have:  - Unusual pain or problems swallowing, unusual stomach or chest pain.  - Vomit that looks like coffee grounds or black or bloody stools (bowel movements).  - A fever above 100.6  F (37.5  C) when taken under the tongue.    Test results  - You will receive your results in 7 to 10 business days by phone, letter or MyChart.    Schedule your PCR Covid Test:   Please ensure your PCR COVID test is scheduled within 96 hours or 4 days of your procedure. If you have not been contacted to schedule please call [phone].    For questions or appointments, call:  [facility] Endoscopy: [phone], option 2  Monday through Friday, 8 a.m. to 4:30 p.m.  (If it is after hours, call [phone]. Ask for the GI fellow resident on call.)

## 2022-04-26 NOTE — OR NURSING
Procedure: EGD with Endo Flip, biopsies and botox injestion  Sedation:monitored anesthesia care  Specimens: x 2 jars, sent to lab.   O2: per CRNA  Tolerated procedure: well  Pt to recovery area in stable condition accompanied by RN.   Other:  none    Blanka Freeman RN

## 2022-04-27 PROBLEM — R74.8 ALKALINE PHOSPHATASE ELEVATION: Status: ACTIVE | Noted: 2022-04-27

## 2022-04-27 PROBLEM — R74.8 ELEVATED LIVER ENZYMES: Status: ACTIVE | Noted: 2022-04-27

## 2022-04-27 PROBLEM — R79.89 ELEVATED TSH: Status: ACTIVE | Noted: 2022-04-27

## 2022-04-27 LAB
PATH REPORT.COMMENTS IMP SPEC: NORMAL
PATH REPORT.COMMENTS IMP SPEC: NORMAL
PATH REPORT.FINAL DX SPEC: NORMAL
PATH REPORT.GROSS SPEC: NORMAL
PATH REPORT.MICROSCOPIC SPEC OTHER STN: NORMAL
PATH REPORT.RELEVANT HX SPEC: NORMAL
PHOTO IMAGE: NORMAL

## 2022-04-28 DIAGNOSIS — K28.9 ANASTOMOTIC ULCER: Primary | ICD-10-CM

## 2022-04-28 RX ORDER — SUCRALFATE 1 G/1
1 TABLET ORAL 4 TIMES DAILY
Qty: 120 TABLET | Refills: 1 | Status: SHIPPED | OUTPATIENT
Start: 2022-04-28 | End: 2022-05-28

## 2022-05-19 ENCOUNTER — TELEPHONE (OUTPATIENT)
Dept: SURGERY | Facility: CLINIC | Age: 62
End: 2022-05-19
Payer: COMMERCIAL

## 2022-09-13 ENCOUNTER — TELEPHONE (OUTPATIENT)
Dept: GASTROENTEROLOGY | Facility: CLINIC | Age: 62
End: 2022-09-13

## 2022-09-13 NOTE — TELEPHONE ENCOUNTER
Called to remind patient of their upcoming appointment with our GI clinic, on 9/20/2022 at 11AM with Dr. Lynch. This appointment is scheduled as a video visit. You will receive a call approximately 30 minutes prior to check you in, you must be in [place] for this visit., if your appointment is virtual (video or telephone) you need to be in [place] for the visit. To reschedule or cancel patient to call [phone].    Itzel Galdamez, [facility]

## 2023-03-06 ENCOUNTER — VIRTUAL VISIT (OUTPATIENT)
Dept: GASTROENTEROLOGY | Facility: CLINIC | Age: 63
End: 2023-03-06
Payer: COMMERCIAL

## 2023-03-06 DIAGNOSIS — R13.19 ESOPHAGEAL DYSPHAGIA: Primary | ICD-10-CM

## 2023-03-06 DIAGNOSIS — K22.4 JACKHAMMER ESOPHAGUS: ICD-10-CM

## 2023-03-06 DIAGNOSIS — K22.4 ESOPHAGEAL DYSKINESIA: ICD-10-CM

## 2023-03-06 PROCEDURE — 99214 OFFICE O/P EST MOD 30 MIN: CPT | Mod: GT | Performed by: INTERNAL MEDICINE

## 2023-03-06 NOTE — PROGRESS NOTES
Video-Visit Details    Type of service:  Video Visit    Video Start Time (time video started): 140    Video End Time (time video stopped): 206    Originating Location (pt. Location): Home        Distant Location (provider location):  Off-site    Mode of Communication:  Video Conference via [facility]    Gastroenterology Visit for: Yuliana Armendariz 1960   MRN: [id number]     Reason for Visit:  chief complaint    Referred by: Syed  / Martina [facility] / [facility] [id number]  Patient Care Team:  Mayda Mccray MD as PCP - General  Parminder Lynch DO as MD (Gastroenterology)  Parminder Lynch DO as Assigned Gastroenterology Provider    History of Present Illness:   Yuliana Armendariz is a 62 year old female with asthma/COPD, GIORGIO with CPAP, HTN, HLD, CHF (No CVA or MI), seizure disorder (on keppra almost 10 years, bariatric surgery (onesimo-en-y 4/23/2014), dysphonia who is presenting as a follow up patient in consultation at the request of Dr. Nino ([facility]) with a chief complaint of dysphagia and hypercontractile esophagus.    Interval History 3/6/2023:   The patient states that she has been doing well. Every so often she has difficulty. 1-2 times a week or month she may have the sensation that food gets caught. Continues to take omeprazole. The patient had regurgitation this morning. Botox appears to have helped and the improvement has been sustained. The patient states that her daughter has achalasia and is concerned about if she has achalasia or not. Patient states that she has early satiety. She denies weight loss due to dysphagia.       See updated BEDQ and eckardt score.   ---------------------------------------------------------------  3/29/2022 Interval History:  Yuliana Armendariz states her main symptoms are chest pain and regurgitation along with dysphagia.    Not currently taking omeprazole since taking diltiazem. Patient states that she has heartburn which is worse now that she is off of her PPI.  "The patient has a history of bariatric surgery. Her symptoms started with non-cardiac chest pain and regurgitation. She feels that food will get stuck and then regurgitate after 30-40 minutes. She can sip room temperature water to help with food that gets stuck. Cold water causes more discomfort. She occasionally drinks ginger ale to see if that helps \"break up acid\". She chews food very thoroughly. Barely eats bread because of symptoms. She is scared to eat foods because of it getting caught. Sweet potatoes and baked chicken.     Her symptoms have been ongoing for \"years\" but now it is worse. Worsening over the past year.     Has had prior endoscopy with balloon dilation to 19mm for a non-obstructing schatzki ring. This did not improve her symptoms. She has been on diltiazem and peppermint without relief.     Daughter diagnosed with achalasia and underwent surgery. She was diagnosed when she was in high school. Father of her daughter has dysphagia as well. Patient does not know of anyone on her side of the family    Patient feels that she has lost approximately 35 pounds however her objective weight is 225lbs.  ---------------------------------------------------------------     Yuliana Armendariz denies  odynophagia,  nausea, vomiting, early satiety, abdominal pain, abdominal distension/bloating, diarrhea, constipation, hematochezia, or melena. No unintentional weight loss.     Wt Readings from Last 5 Encounters:   04/26/22 101.5 kg (223 lb 12.3 oz)   04/21/22 101.2 kg (223 lb)   03/24/22 102.1 kg (225 lb)   02/17/22 102.1 kg (225 lb)   02/04/22 86.2 kg (190 lb)        Esophageal Questionnaire(s)    BEDQ Questionnaire  BEDQ Questionnaire: How Often Have You Had the Following? 2/17/2022 3/29/2022 3/6/2023   Trouble eating solid food (meat, bread, vegetables) 5 4 2   Trouble eating soft foods (yogurt, jello, pudding) 0 2 0   Trouble swallowing liquids 0 0 0   Pain while swallowing 2 4 1   Coughing or choking while " swallowing foods or liquids 0 5 0   Total Score: 7 15 3     BEDQ Questionnaire: Discomfort/Pain Ratings 2/17/2022 3/29/2022 3/6/2023   Eating solid food (meat, bread, vegetables) 5 3 2   Eating soft foods (yogurt, jello, pudding) 0 3 0   Drinking liquid 0 0 0   Total Score: 5 6 2       Eckardt Questionnaire  Eckardt Questionnaire 2/17/2022 3/29/2022 3/6/2023   Dysphagia 3 3 1   Regurgitation 1 1 1   Retrosternal Pain 2 1 0   Weight Loss (kg) 0 0 0   Total Score:  6 5 2       Promis 10 Questionnaire  PROMIS 10 FLOWSHEET DATA 2/17/2022 3/29/2022 3/6/2023   In general, would you say your health is: 2 2 2   In general, would you say your quality of life is: 3 3 2   In general, how would you rate your physical health? 3 2 2   In general, how would you rate your mental health, including your mood and your ability to think? 5 5 2   In general, how would you rate your satisfaction with your social activities and relationships? 5 5 2   In general, please rate how well you carry out your usual social activities and roles. (This includes activities at home, at work and in your community, and responsibilities as a parent, child, spouse, employee, friend, etc.) 5 5 2   To what extent are you able to carry out your everyday physical activities such as walking, climbing stairs, carrying groceries, or moving a chair? 3 3 4   In the past 7 days, how often have you been bothered by emotional problems such as feeling anxious, depressed, or irritable? 3 2 5   In the past 7 days, how would you rate your fatigue on average? 4 3 4   In the past 7 days, how would you rate your pain on average, where 0 means no pain, and 10 means worst imaginable pain? 8 8 10   Mental health question re-calculation - no clinical value 3 4 1   Physical health question re-calculation - no clinical value 2 3 2   Pain question re-calculation - no clinical value 2 2 1   Global Mental Health Score 16 17 7   Global Physical Health Score 10 10 9   PROMIS TOTAL -  SUBSCORES 26 27 16           STUDIES & PROCEDURES:    EGD:     4/26/2022   Findings:        The examined esophagus was normal.        The Z-line was regular and was found 36 cm from the incisors. With the        patient in the left lateral decubitus position, a 16 cm long 3 mm        diameter functional lumen imaging probe (FLIP), with a volume-based        barostat bag, was placed at the lower esophageal sphincter without        endoscopic visualization for the diagnostic evaluation of dysphagia.        Saline was infused into the bag to a volume of 30 mL. Additional saline        was infused to a volume of 40 mL and Additional saline was infused to a        volume of 50 mL. FLIP Topography was performed using stepwise        distensions and demonstrated repetitive antegrade contractions (RAC).        The catheter was deflated and withdrawn. SEE OP NOTE IN EPIC FOR FULL        DATA TABLE. Area was successfully injected with 100 units botulinum        toxin. All contractions were hypercontractile. Did not fill beyond 50ml        due to the significant contractile force.        Abnormal motility was noted in the esophagus. There is spasticity of the        esophageal body. Suggestive of hypercontractile esophagus.        One non-bleeding superficial gastric-jejunal anastomotic ulcer with no        stigmata of bleeding was found at the anastomosis. The lesion was 4 mm        in largest dimension. Biopsies were taken with a cold forceps for        histology. Verification of patient identification for the specimen was        done. Estimated blood loss: none.        The entire examined stomach was normal. Biopsies were taken with a cold        forceps for Helicobacter pylori testing. Verification of patient        identification for the specimen was done. Estimated blood loss: none.        The examined jejunum was normal.                                                                                     Impression:             - Normal esophagus.                          - Z-line regular, 36 cm from the incisors. Injected                          with botulinum toxin.                          - Abnormal esophageal motility, established esophageal                          spasm.                          - Non-bleeding gastric ulcer with no stigmata of                          bleeding. Biopsied.                          - Normal stomach. Biopsied.                          - Normal examined jejunum.                          - FLIP imaging performed, consistent with spastic                          esophageal motility disorder.     Final Diagnosis   A. STOMACH, BIOPSY AT ANASTOMOSIS:  Jejunal mucosa with small ulceration, neutrophils and other changes suggestive of peptic jejunitis; no dysplasia or malignancy      B. STOMACH, BIOPSY:  Gastric body/fundic-type mucosa with PPI-like changes; no significant inflammation; negative for H. pylori, intestinal metaplasia, or dysplasia          Colonoscopy:  Date:  Impression:  Pathology Report:     EndoFLIP directed at the UES or LES (8cm (EF-325) balloon length or 16cm (EF-322) balloon length):   Date:  8cm balloon  Balloon inflation Balloon pressure CSA (mm^2) DI (mm^2/mmHg) Dmin (mm) Compliance   20 (ladmark ID)        30        40        50           EndoFLIP directed at the LES (16cm (EF-322) balloon length):   Date: 4/26/22     16cm balloon  Balloon inflation Balloon pressure CSA (mm^2) DI (mm^2/mmHg) Dmin (mm) Compliance   30 (ladmark ID) 36.1   9.78 21.2     40 66.2   8.58 26.9     50 77   8.84 29.4     60             70                           Hypercontractile RACs     High Resolution Manometry:  Date: 2/17/22  Impression:   The baseline tone of the lower esophageal sphincter was normotensive. The lower esophageal sphincter was not able to relax appropriately as measured by the IRP (17.0). The EGJ morphology was type 1. There was some increased pressurization below the LES which may  be due to the catheter coiling either at the LES or in the gastric pouch post onesimo-en-y bypass. There was peristalsis visible. The DCI, DL, and contractile pattern were not within normal limits. 8/10 swallows were hypercontractile. There were 3/10 swallows with elevated intrabolus pressure and compartmentalized pressurization. Rapid water swallows were performed with less than normal augmentation. Rapid Drink Challenge does not indicate an elevated IRP. Supine liquid swallows were performed. Upright liquid swallows were performed with a median upright IRP of 8.9. All 5/5 swallows were hypercontractile and all 5 had intrabolus pressurization. Bolus transit as measured by impedance was complete in all 10/10 swallows. This is   most consistent with hypercontractile esophagus.       Wording of procedure description and interpretation was adapted from [facility] Weekly Motility Conference (2018).       Impressions   Based on the most recent [place] Classification v4.0, the findings are most consistent with hypercontractile esophagus. There is some intrabolus pressurization noted and an elevated supine IRP however this does not meet criteria for EGJ outflow obstruction based on the improved IRP seen on upright swallows. An endoscopy with endoFLIP may be warranted to maximally evaluate the EGJ prior to any possible non-medical intervention.     *Note, this study was performed while the patient was taking diltiazem.     Dr Parminder Lynch     PH/Impedance:  Date:  Impression:     Bravo:  48 or 96hr  Date:  Impression:    CT:  Date:  Impression:    Esophagram:  Date:  Impression:     Prior medical records were reviewed including, but not limited to, notes from referring providers, lab work, radiographic tests, and other diagnostic tests. Pertinent results were summarized above.     History     Past Medical History:   Diagnosis Date     Asthma      Back pain      Congestive heart failure (H)       COPD (chronic obstructive pulmonary disease) (H)      Degenerative disc disease      Diabetes type 2, controlled (H)      Dyslipidemia      Esophageal dyskinesia      GERD (gastroesophageal reflux disease)      Gout      Hyperparathyroidism (H)      Hyperparathyroidism, secondary (H)      Hypertension      Low bone mass      Metabolic syndrome      Morbid obesity (H)      Osteoarthritis      Reflux        Past Surgical History:   Procedure Laterality Date     ARTHROSCOPY KNEE WITH MENISCAL REPAIR Left 01/2019     CHOLECYSTECTOMY       ESOPHAGEAL BALLOON PROVOCATION STUDY N/A 4/26/2022    Procedure: ESOPHAGEAL BALLOON PROVOCATION STUDY;  Surgeon: Parminder Lynch DO;  Location: [facility]     ESOPHAGOGASTRODUODENOSCOPY, WITH BOTULINUM TOXIN INJECTION  4/26/2022    Procedure: Esophagogastroduodenoscopy, With Botulinum Toxin Injection;  Surgeon: Parminder Lynch DO;  Location: [facility]     ESOPHAGOSCOPY, GASTROSCOPY, DUODENOSCOPY (EGD), COMBINED N/A 4/26/2022    Procedure: ESOPHAGOGASTRODUODENOSCOPY, WITH BIOPSY;  Surgeon: Parminder Lynch DO;  Location: [facility]     HYSTERECTOMY       REVISION RIAN-EN-Y  4/23/2014    Laura     TOTAL KNEE ARTHROPLASTY Left 03/2019     TUBAL LIGATION         Social History     Socioeconomic History     Marital status: Single     Spouse name: Not on file     Number of children: Not on file     Years of education: Not on file     Highest education level: Not on file   Occupational History     Not on file   Tobacco Use     Smoking status: Former     Smokeless tobacco: Never     Tobacco comments:     quit over 25 years ago   Substance and Sexual Activity     Alcohol use: Yes     Comment: Alcoholic Drinks/day: Rarely     Drug use: No     Sexual activity: Not Currently     Partners: Male     Birth control/protection: Surgical   Other Topics Concern     Not on file   Social History Narrative     Not on file     Social Determinants of Health     Financial Resource Strain: Not on file   Food Insecurity: Not on  file   Transportation Needs: Not on file   Physical Activity: Not on file   Stress: Not on file   Social Connections: Not on file   Intimate Partner Violence: Not At Risk     Fear of Current or Ex-Partner: No     Emotionally Abused: No     Physically Abused: No     Sexually Abused: No   Housing Stability: Not on file       Family History   Problem Relation Age of Onset     Diabetes Mother      Heart Disease Mother      Hyperlipidemia Mother      Hypertension Mother      Family history reviewed and edited as appropriate    Medications and Allergies:     Outpatient Encounter Medications as of 3/6/2023   Medication Sig Dispense Refill     ADVAIR -21 mcg/actuation inhaler [ADVAIR -21 MCG/ACTUATION INHALER] Inhale 1 puff 2 (two) times a day.  11     albuterol (PROVENTIL HFA;VENTOLIN HFA) 90 mcg/actuation inhaler [ALBUTEROL (PROVENTIL HFA;VENTOLIN HFA) 90 MCG/ACTUATION INHALER] Inhale 2 puffs every 6 (six) hours as needed for wheezing.       albuterol (PROVENTIL) 2.5 mg /3 mL (0.083 %) nebulizer solution [ALBUTEROL (PROVENTIL) 2.5 MG /3 ML (0.083 %) NEBULIZER SOLUTION] Take 2.5 mg by nebulization every 6 (six) hours as needed for wheezing.       amLODIPine (NORVASC) 5 MG tablet [AMLODIPINE (NORVASC) 5 MG TABLET] Take 2.5 mg by mouth daily.        atorvastatin (LIPITOR) 20 MG tablet [ATORVASTATIN (LIPITOR) 20 MG TABLET] Take 20 mg by mouth bedtime.       CALCIUM CITRATE/VITAMIN D3 (CITRACAL + D PETITES ORAL) [CALCIUM CITRATE/VITAMIN D3 (CITRACAL + D PETITES ORAL)] Take 2 tablets by mouth 2 (two) times a day.        carvedilol (COREG) 3.125 MG tablet [CARVEDILOL (COREG) 3.125 MG TABLET] Take 3.125 mg by mouth bedtime.        clotrimazole (LOTRIMIN) 1 % cream [CLOTRIMAZOLE (LOTRIMIN) 1 % CREAM] Apply 1 application topically 2 (two) times a day.  1     CONTOUR NEXT TEST STRIPS strips [CONTOUR NEXT TEST STRIPS STRIPS] daily. TEST ONCE DAILY       cyanocobalamin, vitamin B-12, 1,000 mcg Subl [CYANOCOBALAMIN,  VITAMIN B-12, 1,000 MCG SUBL] Place 1 tablet (1,000 mcg total) under the tongue daily. 90 tablet prn     diltiazem (CARDIZEM) 60 MG tablet Take 1 tablet by mouth 4 times daily       fluocinonide (LIDEX) 0.05 % cream [FLUOCINONIDE (LIDEX) 0.05 % CREAM] Apply 1 application topically 2 (two) times a day.       hydrocortisone 2.5 % cream [HYDROCORTISONE 2.5 % CREAM] Apply 1 application topically as needed.       levETIRAcetam (KEPPRA) 1000 MG tablet [LEVETIRACETAM (KEPPRA) 1000 MG TABLET] Take 1 tablet by mouth 2 (two) times a day.  3     losartan (COZAAR) 50 MG tablet [LOSARTAN (COZAAR) 50 MG TABLET] Take 50 mg by mouth daily. Two tabs daily       miconazole (MICOTIN) 2 % powder [MICONAZOLE (MICOTIN) 2 % POWDER] Apply 1 application topically as needed for itching (for rash under breasts).       montelukast (SINGULAIR) 10 mg tablet [MONTELUKAST (SINGULAIR) 10 MG TABLET] Take 10 mg by mouth bedtime.       omeprazole (PRILOSEC) 20 MG capsule Take 20 mg by mouth 2 times daily       pediatric multivit-iron-min (CEROVITE JR) Chew [PEDIATRIC MULTIVIT-IRON-MIN (CEROVITE JR) CHEW] Chew 1 tablet 2 (two) times a day. 180 each 3     QUEtiapine (SEROQUEL) 25 MG tablet [QUETIAPINE (SEROQUEL) 25 MG TABLET] Take 25 mg by mouth at bedtime.       SF 5000 PLUS 1.1 % Crea [SF 5000 PLUS 1.1 % CREA] Apply 1 application topically as needed.  0     SYMBICORT 80-4.5 MCG/ACT Inhaler INHALE 2 PUFFS BY MOUTH TWICE DAILY. RINSE MOUTH /. GARGLE AFTER USE       traZODone (DESYREL) 50 MG tablet [TRAZODONE (DESYREL) 50 MG TABLET] at bedtime as needed.       venlafaxine (EFFEXOR-XR) 150 MG 24 hr capsule [VENLAFAXINE (EFFEXOR-XR) 150 MG 24 HR CAPSULE] Take 1 capsule by mouth daily.  11     acetaminophen (TYLENOL) 500 MG tablet Take 500-1,000 mg by mouth as needed (Patient not taking: Reported on 3/6/2023)       celecoxib (CELEBREX) 100 MG capsule [CELECOXIB (CELEBREX) 100 MG CAPSULE] Take 1 capsule by mouth 2 (two) times a day. (Patient not taking:  Reported on 3/6/2023)       phentermine (ADIPEX-P) 37.5 MG tablet TAKE HALF TO ONE TABLET BY MOUTH DAILY BEFORE BREAKFAST (Patient not taking: Reported on 3/6/2023) 90 tablet 1     No facility-administered encounter medications on file as of 3/6/2023.        Allergies   Allergen Reactions     Aspirin Other (See Comments)     Hx gastric bypass     Hydrocodone Other (See Comments)     Itching     Latex Unknown     Itchy     Vicodin [Hydrocodone-Acetaminophen] Unknown     Itchy        Review of systems:  A full 10 point review of systems was obtained and was negative except for the pertinent positives and negatives stated within the HPI.    Objective Findings:   Physical Exam:    Constitutional: There were no vitals taken for this visit.  Telephone consultation     Labs, Radiology, Pathology     Lab Results   Component Value Date    WBC 9.4 04/21/2022    WBC 9.9 09/23/2020    WBC 9.6 06/06/2019    HGB 12.5 04/21/2022    HGB 11.7 (L) 09/23/2020    HGB 12.6 06/06/2019     04/21/2022     09/23/2020     06/06/2019    CHOL 181 04/21/2022    CHOL 204 (H) 06/06/2019    CHOL 168 03/07/2018    TRIG 105 04/21/2022    TRIG 166 (H) 06/06/2019    TRIG 96 03/07/2018    HDL 92 04/21/2022    HDL 85 06/06/2019    HDL 81 03/07/2018    ALT 63 (H) 04/21/2022    ALT 63 (H) 03/08/2021    ALT 29 06/06/2019     (H) 04/21/2022    AST 58 (H) 03/08/2021    AST 27 06/06/2019     04/21/2022     03/08/2021     06/06/2019    BUN 8 04/21/2022    BUN 8 03/08/2021    BUN 16 06/06/2019    CO2 27 04/21/2022    CO2 33 (H) 03/08/2021    CO2 24 06/06/2019    TSH 16.79 (H) 04/21/2022        Liver Function Studies -   Recent Labs   Lab Test 03/08/21  1425   PROTTOTAL 7.6   ALBUMIN 4.4   BILITOTAL 0.7   ALKPHOS 174*   AST 58*   ALT 63*          Patient Active Problem List    Diagnosis Date Noted     Elevated TSH 04/27/2022     Priority: Medium     Elevated liver enzymes 04/27/2022     Priority: Medium     Alkaline  phosphatase elevation 04/27/2022     Priority: Medium     Morbid obesity (H) 04/21/2022     Priority: Medium     Congestive heart failure, unspecified HF chronicity, unspecified heart failure type (H) 03/29/2022     Priority: Medium     Esophageal dyskinesia 03/29/2022     Priority: Medium     Non-cardiac chest pain 03/29/2022     Priority: Medium     Regurgitation of food 03/29/2022     Priority: Medium     Esophageal dysphagia 03/29/2022     Priority: Medium      Assessment and Plan   Assessment:    Yuliana Armendariz is a 62 year old female with asthma/COPD, GIORGIO with CPAP, HTN, HLD, CHF (No CVA or MI), seizure disorder (on keppra almost 10 years, bariatric surgery (onesimo-en-y 4/23/2014), dysphonia who is presenting as a follow up patient in consultation at the request of Dr. Nino ([facility]) with a chief complaint of dysphagia and hypercontractile esophagus.    The patient was seen in telephone consultation regarding her symptoms of dysphagia and hypercontractile esophagus.  The patient has undergone upper endoscopy with Botox to the lower esophageal sphincter as well as Endoflip.  She does not have any evidence of achalasia at this time.  She has had a durable response to Botox now almost 1 year later.  Her Eckardt score went from 6 to 2 which is a good clinical response.  At this time I have asked that she continue taking her omeprazole.  I we will have her follow-up in 6 months or sooner if new symptoms arise or worsen.    She did have a question regarding a medication that she believes I have prescribed for her.  She was currently grocery shopping and did not recall the name of the medication.  I have asked that she send me a AIMt message with the name of the medication so that I can address her question regarding whether or not she should take it or not    Plan:    1. Please continue taking your medications. Send a mychart message with the name of the medication you were asking whether or not you should  continue to take.   2. Follow up in 6 months    Follow up plan:   Return to clinic 6 months and as needed.    The risks and benefits of my recommendations, as well as other treatment options were discussed with the patient and any available family today. All questions were answered.     o Follow up: As planned above. Today, I personally spent 26 minutes in direct telephone time with the patient, of which greater than 50% of the time was spent in patient education and counseling as described above. Approximately 10 minutes were spent on indirect care associated with the patient's consultation including but not limited to review of: patient medical records to date, clinic visits, hospital records, lab results, imaging studies, procedural documentation, and coordinating care with other providers. The findings from this review are summarized in the above note. All of the above accounted for a cumulative time of 36 minutes and was performed on the date of service.     The patient verbalized understanding of the plan and was appreciative for the time spent and information provided during the office visit.     Author:   Parminder Lynch DO   of Medicine  Director, Esophageal Disorders Program  Division of Gastroenterology, Hepatology, and Nutrition  [facility]     Documentation assisted by voice recognition and documentation system.

## 2023-03-06 NOTE — NURSING NOTE
Is the patient currently in the state of [place]? YES    Visit mode:VIDEO    If the visit is dropped, the patient can be reconnected by: VIDEO VISIT: Send to e-mail at: [email or web address]    Will anyone else be joining the visit? NO      How would you like to obtain your AVS? Mail a copy    Are changes needed to the allergy or medication list? NO    Reason for visit: Follow Up

## 2023-03-06 NOTE — PATIENT INSTRUCTIONS
It was a pleasure taking care of you today.  I've included a brief summary of our discussion and care plan from today's visit below.  Please review this information with your primary care provider.  _______________________________________________________________________    My recommendations are summarized as follows:    Please continue taking your medications. Send a [employer] message with the name of the medication you were asking whether or not you should continue to take.   Follow up in 6 months    To schedule endoscopic procedures you may call: [phone]  To schedule radiology tests you may call: [phone]  To schedule an ENT appointment you may call: [phone]    Please call my nurse care coordinator Samanta ([phone]) or Ana Lilia ([phone]), with any questions or concerns.  If you were seen through the [facility] please feel free to reach out to Shima at [phone]   --    Return to GI Clinic in 6 months to review your progress.    _______________________________________________________________________    Who do I call with any questions after my visit?  Please be in touch if there are any further questions that arise following today's visit.  There are multiple ways to contact your gastroenterology care team.      During business hours, you may reach a Gastroenterology nurse at [phone] and choose option 3.       To schedule or reschedule an appointment, please call [phone].     You can always send a secure message through [employer].  [employer] messages are answered by your nurse or doctor typically within 24 hours.  Please allow extra time on weekends and holidays.      For urgent/emergent questions after business hours, you may reach the on-call GI Fellow by contacting the [facility] at [phone].     How will I get the results of any tests ordered?    You will receive all of your results.  If you have signed up for MyChart, any tests ordered at your visit will  be available to you after your physician reviews them.  Typically this takes 1-2 weeks.  If there are urgent results that require a change in your care plan, your physician or nurse will call you to discuss the next steps.      What is [employer]?  [employer] is a secure way for you to access all of your healthcare records from the [facility].  It is a web based computer program, so you can sign on to it from any location.  It also allows you to send secure messages to your care team.  I recommend signing up for [employer] access if you have not already done so and are comfortable with using a computer.      How to I schedule a follow-up visit?  If you did not schedule a follow-up visit today, please call [phone] to schedule a follow-up office visit.      If you feel you received exceptional care and are interested in supporting the clinical and research goals of Dr. Lynch or the Division of Gastroenterology, Hepatology, and Nutrition please contact [email or web address] from the [facility] to discuss opportunities to donate.    Sincerely,    Parminder Lynch DO   of Medicine  Director, Esophageal Disorders Program  Division of Gastroenterology, Hepatology, and Nutrition  [facility]

## 2023-03-10 ENCOUNTER — TELEPHONE (OUTPATIENT)
Dept: GASTROENTEROLOGY | Facility: CLINIC | Age: 63
End: 2023-03-10
Payer: COMMERCIAL

## 2023-05-08 ENCOUNTER — LAB (OUTPATIENT)
Dept: LAB | Facility: HOSPITAL | Age: 63
End: 2023-05-08
Payer: COMMERCIAL

## 2023-05-08 DIAGNOSIS — K90.9 INTESTINAL MALABSORPTION, UNSPECIFIED TYPE: ICD-10-CM

## 2023-05-08 DIAGNOSIS — Z98.84 S/P BARIATRIC SURGERY: ICD-10-CM

## 2023-05-08 DIAGNOSIS — K91.2 POSTOPERATIVE MALABSORPTION: ICD-10-CM

## 2023-05-08 DIAGNOSIS — K91.2 POSTOPERATIVE MALABSORPTION: Primary | ICD-10-CM

## 2023-05-08 LAB
ALBUMIN SERPL BCG-MCNC: 4.3 G/DL (ref 3.5–5.2)
ALP SERPL-CCNC: 375 U/L (ref 35–104)
ALT SERPL W P-5'-P-CCNC: 56 U/L (ref 10–35)
ANION GAP SERPL CALCULATED.3IONS-SCNC: 13 MMOL/L (ref 7–15)
AST SERPL W P-5'-P-CCNC: 94 U/L (ref 10–35)
BILIRUB SERPL-MCNC: 0.6 MG/DL
BUN SERPL-MCNC: 10.9 MG/DL (ref 8–23)
CALCIUM SERPL-MCNC: 9.7 MG/DL (ref 8.8–10.2)
CHLORIDE SERPL-SCNC: 102 MMOL/L (ref 98–107)
CHOLEST SERPL-MCNC: 181 MG/DL
CREAT SERPL-MCNC: 0.99 MG/DL (ref 0.51–0.95)
DEPRECATED CALCIDIOL+CALCIFEROL SERPL-MC: 55 UG/L (ref 20–75)
DEPRECATED HCO3 PLAS-SCNC: 27 MMOL/L (ref 22–29)
ERYTHROCYTE [DISTWIDTH] IN BLOOD BY AUTOMATED COUNT: 13.4 % (ref 10–15)
FERRITIN SERPL-MCNC: 157 NG/ML (ref 11–328)
FOLATE SERPL-MCNC: >40 NG/ML (ref 4.6–34.8)
GFR SERPL CREATININE-BSD FRML MDRD: 65 ML/MIN/1.73M2
GLUCOSE SERPL-MCNC: 128 MG/DL (ref 70–99)
HBA1C MFR BLD: 6.1 %
HCT VFR BLD AUTO: 38.8 % (ref 35–47)
HDLC SERPL-MCNC: 85 MG/DL
HGB BLD-MCNC: 12.5 G/DL (ref 11.7–15.7)
LDLC SERPL CALC-MCNC: 74 MG/DL
MCH RBC QN AUTO: 30.5 PG (ref 26.5–33)
MCHC RBC AUTO-ENTMCNC: 32.2 G/DL (ref 31.5–36.5)
MCV RBC AUTO: 95 FL (ref 78–100)
NONHDLC SERPL-MCNC: 96 MG/DL
PLATELET # BLD AUTO: 283 10E3/UL (ref 150–450)
POTASSIUM SERPL-SCNC: 3.8 MMOL/L (ref 3.4–5.3)
PROT SERPL-MCNC: 7.8 G/DL (ref 6.4–8.3)
RBC # BLD AUTO: 4.1 10E6/UL (ref 3.8–5.2)
SODIUM SERPL-SCNC: 142 MMOL/L (ref 136–145)
TRIGL SERPL-MCNC: 109 MG/DL
VIT B12 SERPL-MCNC: 2621 PG/ML (ref 232–1245)
WBC # BLD AUTO: 10.1 10E3/UL (ref 4–11)

## 2023-05-08 PROCEDURE — 82306 VITAMIN D 25 HYDROXY: CPT

## 2023-05-08 PROCEDURE — 83970 ASSAY OF PARATHORMONE: CPT

## 2023-05-08 PROCEDURE — 80061 LIPID PANEL: CPT

## 2023-05-08 PROCEDURE — 84590 ASSAY OF VITAMIN A: CPT

## 2023-05-08 PROCEDURE — 85027 COMPLETE CBC AUTOMATED: CPT

## 2023-05-08 PROCEDURE — 36415 COLL VENOUS BLD VENIPUNCTURE: CPT

## 2023-05-08 PROCEDURE — 82310 ASSAY OF CALCIUM: CPT

## 2023-05-08 PROCEDURE — 82746 ASSAY OF FOLIC ACID SERUM: CPT

## 2023-05-08 PROCEDURE — 82728 ASSAY OF FERRITIN: CPT

## 2023-05-08 PROCEDURE — 83036 HEMOGLOBIN GLYCOSYLATED A1C: CPT

## 2023-05-08 PROCEDURE — 84425 ASSAY OF VITAMIN B-1: CPT

## 2023-05-08 PROCEDURE — 82607 VITAMIN B-12: CPT

## 2023-05-08 PROCEDURE — 84630 ASSAY OF ZINC: CPT

## 2023-05-08 NOTE — PROGRESS NOTES
[facility] and needs her annual lab orders entered. She said she would just pop over to [facility] for them. She would like a call when they have been entered. [phone].     Thanks       9 yrs post op lab orders placed for patient in preparation for appointment with  in June. Called pt to let her know that the orders are in and left her a message with the info. I asked her to call with any questions.    Sandy Delacruz RN, CBN  [facility]  P [phone]  F [phone]

## 2023-05-09 LAB — PTH-INTACT SERPL-MCNC: 53 PG/ML (ref 15–65)

## 2023-05-10 LAB
ANNOTATION COMMENT IMP: NORMAL
RETINYL PALMITATE SERPL-MCNC: 0.05 MG/L
VIT A SERPL-MCNC: 0.77 MG/L
VIT B1 PYROPHOSHATE BLD-SCNC: 214 NMOL/L
ZINC SERPL-MCNC: 88.6 UG/DL

## 2023-06-06 ENCOUNTER — OFFICE VISIT (OUTPATIENT)
Dept: SURGERY | Facility: CLINIC | Age: 63
End: 2023-06-06
Payer: COMMERCIAL

## 2023-06-06 VITALS
DIASTOLIC BLOOD PRESSURE: 68 MMHG | WEIGHT: 217.6 LBS | SYSTOLIC BLOOD PRESSURE: 124 MMHG | HEIGHT: 65 IN | BODY MASS INDEX: 36.25 KG/M2

## 2023-06-06 DIAGNOSIS — K91.2 POSTOPERATIVE MALABSORPTION: Primary | ICD-10-CM

## 2023-06-06 DIAGNOSIS — R63.2 HYPERPHAGIA: ICD-10-CM

## 2023-06-06 DIAGNOSIS — K76.0 HEPATIC STEATOSIS: ICD-10-CM

## 2023-06-06 DIAGNOSIS — E66.811 OBESITY (BMI 30.0-34.9): ICD-10-CM

## 2023-06-06 PROCEDURE — 99214 OFFICE O/P EST MOD 30 MIN: CPT | Performed by: FAMILY MEDICINE

## 2023-06-06 RX ORDER — GABAPENTIN 100 MG/1
100 CAPSULE ORAL ONCE
COMMUNITY

## 2023-06-06 RX ORDER — PHENTERMINE HYDROCHLORIDE 37.5 MG/1
TABLET ORAL
Qty: 90 TABLET | Refills: 1 | Status: SHIPPED | OUTPATIENT
Start: 2023-06-06 | End: 2023-12-06

## 2023-06-06 NOTE — LETTER
6/6/2023         RE: Yuliana Armendariz  [address]  [place] [address]        Dear Colleague,    Thank you for referring your patient, Yuliana Armendariz, to the [facility] [place]. Please see a copy of my visit note below.    Bariatric Follow Up Visit with a History of Previous Bariatric Surgery     Date of visit: 6/6/2023  Physician: Dorothy Coronado MD, MD  Primary Care Provider:  Mayda Mccray  Yuliana Armendariz   62 year old  female    Date of Surgery: 4/23/2014  Initial Weight: 231#  Initial BMI: 38.9  Today's Weight:   Wt Readings from Last 1 Encounters:   06/06/23 98.7 kg (217 lb 9.6 oz)     Body mass index is 36.21 kg/m .      Assessment and Plan     Assessment: Yuliana is a 62 year old year old female who is 9 yrs s/p  Jose Daniel en Y Gastric Bypass with Dr. Sanchez  Yuliana Armendariz feels as if she has achieved the goals she hoped to accomplish through bariatric surgery and weight loss.    Encounter Diagnoses   Name Primary?     Postoperative malabsorption Yes     Hepatic steatosis      Obesity (BMI 30.0-34.9)      Hyperphagia          Current Outpatient Medications:      acetaminophen (TYLENOL) 500 MG tablet, Take 500-1,000 mg by mouth as needed, Disp: , Rfl:      ADVAIR -21 mcg/actuation inhaler, [ADVAIR -21 MCG/ACTUATION INHALER] Inhale 1 puff 2 (two) times a day., Disp: , Rfl: 11     albuterol (PROVENTIL HFA;VENTOLIN HFA) 90 mcg/actuation inhaler, [ALBUTEROL (PROVENTIL HFA;VENTOLIN HFA) 90 MCG/ACTUATION INHALER] Inhale 2 puffs every 6 (six) hours as needed for wheezing., Disp: , Rfl:      albuterol (PROVENTIL) 2.5 mg /3 mL (0.083 %) nebulizer solution, [ALBUTEROL (PROVENTIL) 2.5 MG /3 ML (0.083 %) NEBULIZER SOLUTION] Take 2.5 mg by nebulization every 6 (six) hours as needed for wheezing., Disp: , Rfl:      amLODIPine (NORVASC) 5 MG tablet, [AMLODIPINE (NORVASC) 5 MG TABLET] Take 2.5 mg by mouth daily. , Disp: , Rfl:       atorvastatin (LIPITOR) 20 MG tablet, [ATORVASTATIN (LIPITOR) 20 MG TABLET] Take 20 mg by mouth bedtime., Disp: , Rfl:      CALCIUM CITRATE/VITAMIN D3 (CITRACAL + D PETITES ORAL), [CALCIUM CITRATE/VITAMIN D3 (CITRACAL + D PETITES ORAL)] Take 2 tablets by mouth 2 (two) times a day. , Disp: , Rfl:      carvedilol (COREG) 3.125 MG tablet, [CARVEDILOL (COREG) 3.125 MG TABLET] Take 3.125 mg by mouth bedtime. , Disp: , Rfl:      cholecalciferol (VITAMIN D3) 125 mcg (5000 units) capsule, Take 125 mcg by mouth daily, Disp: , Rfl:      clotrimazole (LOTRIMIN) 1 % cream, [CLOTRIMAZOLE (LOTRIMIN) 1 % CREAM] Apply 1 application topically 2 (two) times a day., Disp: , Rfl: 1     CONTOUR NEXT TEST STRIPS strips, [CONTOUR NEXT TEST STRIPS STRIPS] daily. TEST ONCE DAILY, Disp: , Rfl:      cyanocobalamin, vitamin B-12, 1,000 mcg Subl, [CYANOCOBALAMIN, VITAMIN B-12, 1,000 MCG SUBL] Place 1 tablet (1,000 mcg total) under the tongue daily., Disp: 90 tablet, Rfl: prn     diltiazem (CARDIZEM) 60 MG tablet, Take 1 tablet by mouth 4 times daily, Disp: , Rfl:      fluocinonide (LIDEX) 0.05 % cream, [FLUOCINONIDE (LIDEX) 0.05 % CREAM] Apply 1 application topically 2 (two) times a day., Disp: , Rfl:      gabapentin (NEURONTIN) 100 MG capsule, Take 100 mg by mouth once Unsure of does, Disp: , Rfl:      hydrocortisone 2.5 % cream, [HYDROCORTISONE 2.5 % CREAM] Apply 1 application topically as needed., Disp: , Rfl:      levETIRAcetam (KEPPRA) 1000 MG tablet, [LEVETIRACETAM (KEPPRA) 1000 MG TABLET] Take 1 tablet by mouth 2 (two) times a day., Disp: , Rfl: 3     losartan (COZAAR) 50 MG tablet, [LOSARTAN (COZAAR) 50 MG TABLET] Take 50 mg by mouth daily. Two tabs daily, Disp: , Rfl:      miconazole (MICOTIN) 2 % powder, [MICONAZOLE (MICOTIN) 2 % POWDER] Apply 1 application topically as needed for itching (for rash under breasts)., Disp: , Rfl:      montelukast (SINGULAIR) 10 mg tablet, [MONTELUKAST (SINGULAIR) 10 MG TABLET] Take 10 mg by mouth  "bedtime., Disp: , Rfl:      omeprazole (PRILOSEC) 20 MG capsule, Take 20 mg by mouth 2 times daily, Disp: , Rfl:      pediatric multivit-iron-min (CEROVITE JR) Chew, [PEDIATRIC MULTIVIT-IRON-MIN (CEROVITE JR) CHEW] Chew 1 tablet 2 (two) times a day., Disp: 180 each, Rfl: 3     phentermine (ADIPEX-P) 37.5 MG tablet, TAKE HALF TO ONE TABLET BY MOUTH DAILY BEFORE BREAKFAST, Disp: 90 tablet, Rfl: 1     QUEtiapine (SEROQUEL) 25 MG tablet, [QUETIAPINE (SEROQUEL) 25 MG TABLET] Take 25 mg by mouth at bedtime., Disp: , Rfl:      SF 5000 PLUS 1.1 % Crea, [SF 5000 PLUS 1.1 % CREA] Apply 1 application topically as needed., Disp: , Rfl: 0     SYMBICORT 80-4.5 MCG/ACT Inhaler, INHALE 2 PUFFS BY MOUTH TWICE DAILY. RINSE MOUTH /. GARGLE AFTER USE, Disp: , Rfl:      traZODone (DESYREL) 50 MG tablet, [TRAZODONE (DESYREL) 50 MG TABLET] at bedtime as needed., Disp: , Rfl:      venlafaxine (EFFEXOR-XR) 150 MG 24 hr capsule, [VENLAFAXINE (EFFEXOR-XR) 150 MG 24 HR CAPSULE] Take 1 capsule by mouth daily., Disp: , Rfl: 11     Plan: Continue vitamins with consistency. Refill phentermine. Dietitian prn.    No follow-ups on file.    Bariatric Surgery Review     Interim History/LifeChanges: Maintains a 14# weight loss. Had her shoulder surgery (2nd one) and was in [place] for Sepsis. Also having low back pain d/t disc herniations. Rashes under the skin folds. Ha antifungal RX from Dr. Nate Cash. Going to [facility]    Patient Concerns: labs  Appetite (1-10): OK/low  GERD: on PPI    Medication changes: no    Vitamin Intake:   B-12   SL   MVI  2/d   Vitamin D  5,000   Calcium   citrate     Other                LABS: \"Reviewed   A1c 6.1,     Nausea rare  Vomiting rare  Constipation no  Diarrhea no  Rashes yes!  Hair Loss no  Calf tenderness no  Breathing difficulty no  Reactive Hypoglycemia no  Light Headedness no   Moods tired out more than anything    12 point ROS as above and otherwise negative      Habits:  Alcohol: wine  Tobacco: no  Caffeine " occ  NSAIDS no  Exercise Routine: walks the dog  3 meals/day yes  Protein first yes  ? grams/day  Water Separate from meals   Calorie Containing Beverages no  Restaurant eating/wk <2  Sleeping with trazodone 7-8 hour  Stress high  CPAP: not using  Contraception: PM  DEXA:stable LBM    Social History     Social History     Socioeconomic History     Marital status: Single     Spouse name: Not on file     Number of children: Not on file     Years of education: Not on file     Highest education level: Not on file   Occupational History     Not on file   Tobacco Use     Smoking status: Former     Smokeless tobacco: Never     Tobacco comments:     quit over 25 years ago   Vaping Use     Vaping status: Not on file   Substance and Sexual Activity     Alcohol use: Yes     Comment: Alcoholic Drinks/day: Rarely     Drug use: No     Sexual activity: Not Currently     Partners: Male     Birth control/protection: Surgical   Other Topics Concern     Not on file   Social History Narrative    Single. Lives with her dog     Social Determinants of Health     Financial Resource Strain: Not on file   Food Insecurity: Not on file   Transportation Needs: Not on file   Physical Activity: Not on file   Stress: Not on file   Social Connections: Not on file   Intimate Partner Violence: Not At Risk (2/17/2022)    Humiliation, Afraid, Rape, and Kick questionnaire      Fear of Current or Ex-Partner: No      Emotionally Abused: No      Physically Abused: No      Sexually Abused: No   Housing Stability: Not on file       Past Medical History     Past Medical History:   Diagnosis Date     Asthma      Back pain      Congestive heart failure (H)      COPD (chronic obstructive pulmonary disease) (H)      Degenerative disc disease      Diabetes type 2, controlled (H)      Dyslipidemia      Esophageal dyskinesia      GERD (gastroesophageal reflux disease)      Gout      Hepatic steatosis      Hyperparathyroidism (H)      Hyperparathyroidism, secondary  (H)      Hypertension      Low bone mass      Metabolic syndrome      Morbid obesity (H)      Osteoarthritis      Reflux      Problem List     Patient Active Problem List   Diagnosis     Congestive heart failure, unspecified HF chronicity, unspecified heart failure type (H)     Esophageal dyskinesia     Non-cardiac chest pain     Regurgitation of food     Esophageal dysphagia     Morbid obesity (H)     Elevated TSH     Elevated liver enzymes     Alkaline phosphatase elevation     Hepatic steatosis     Medications       Current Outpatient Medications:      acetaminophen (TYLENOL) 500 MG tablet, Take 500-1,000 mg by mouth as needed, Disp: , Rfl:      ADVAIR -21 mcg/actuation inhaler, [ADVAIR -21 MCG/ACTUATION INHALER] Inhale 1 puff 2 (two) times a day., Disp: , Rfl: 11     albuterol (PROVENTIL HFA;VENTOLIN HFA) 90 mcg/actuation inhaler, [ALBUTEROL (PROVENTIL HFA;VENTOLIN HFA) 90 MCG/ACTUATION INHALER] Inhale 2 puffs every 6 (six) hours as needed for wheezing., Disp: , Rfl:      albuterol (PROVENTIL) 2.5 mg /3 mL (0.083 %) nebulizer solution, [ALBUTEROL (PROVENTIL) 2.5 MG /3 ML (0.083 %) NEBULIZER SOLUTION] Take 2.5 mg by nebulization every 6 (six) hours as needed for wheezing., Disp: , Rfl:      amLODIPine (NORVASC) 5 MG tablet, [AMLODIPINE (NORVASC) 5 MG TABLET] Take 2.5 mg by mouth daily. , Disp: , Rfl:      atorvastatin (LIPITOR) 20 MG tablet, [ATORVASTATIN (LIPITOR) 20 MG TABLET] Take 20 mg by mouth bedtime., Disp: , Rfl:      CALCIUM CITRATE/VITAMIN D3 (CITRACAL + D PETITES ORAL), [CALCIUM CITRATE/VITAMIN D3 (CITRACAL + D PETITES ORAL)] Take 2 tablets by mouth 2 (two) times a day. , Disp: , Rfl:      carvedilol (COREG) 3.125 MG tablet, [CARVEDILOL (COREG) 3.125 MG TABLET] Take 3.125 mg by mouth bedtime. , Disp: , Rfl:      cholecalciferol (VITAMIN D3) 125 mcg (5000 units) capsule, Take 125 mcg by mouth daily, Disp: , Rfl:      clotrimazole (LOTRIMIN) 1 % cream, [CLOTRIMAZOLE (LOTRIMIN) 1 % CREAM]  Apply 1 application topically 2 (two) times a day., Disp: , Rfl: 1     CONTOUR NEXT TEST STRIPS strips, [CONTOUR NEXT TEST STRIPS STRIPS] daily. TEST ONCE DAILY, Disp: , Rfl:      cyanocobalamin, vitamin B-12, 1,000 mcg Subl, [CYANOCOBALAMIN, VITAMIN B-12, 1,000 MCG SUBL] Place 1 tablet (1,000 mcg total) under the tongue daily., Disp: 90 tablet, Rfl: prn     diltiazem (CARDIZEM) 60 MG tablet, Take 1 tablet by mouth 4 times daily, Disp: , Rfl:      fluocinonide (LIDEX) 0.05 % cream, [FLUOCINONIDE (LIDEX) 0.05 % CREAM] Apply 1 application topically 2 (two) times a day., Disp: , Rfl:      gabapentin (NEURONTIN) 100 MG capsule, Take 100 mg by mouth once Unsure of does, Disp: , Rfl:      hydrocortisone 2.5 % cream, [HYDROCORTISONE 2.5 % CREAM] Apply 1 application topically as needed., Disp: , Rfl:      levETIRAcetam (KEPPRA) 1000 MG tablet, [LEVETIRACETAM (KEPPRA) 1000 MG TABLET] Take 1 tablet by mouth 2 (two) times a day., Disp: , Rfl: 3     losartan (COZAAR) 50 MG tablet, [LOSARTAN (COZAAR) 50 MG TABLET] Take 50 mg by mouth daily. Two tabs daily, Disp: , Rfl:      miconazole (MICOTIN) 2 % powder, [MICONAZOLE (MICOTIN) 2 % POWDER] Apply 1 application topically as needed for itching (for rash under breasts)., Disp: , Rfl:      montelukast (SINGULAIR) 10 mg tablet, [MONTELUKAST (SINGULAIR) 10 MG TABLET] Take 10 mg by mouth bedtime., Disp: , Rfl:      omeprazole (PRILOSEC) 20 MG capsule, Take 20 mg by mouth 2 times daily, Disp: , Rfl:      pediatric multivit-iron-min (CEROVITE JR) Chew, [PEDIATRIC MULTIVIT-IRON-MIN (CEROVITE JR) CHEW] Chew 1 tablet 2 (two) times a day., Disp: 180 each, Rfl: 3     phentermine (ADIPEX-P) 37.5 MG tablet, TAKE HALF TO ONE TABLET BY MOUTH DAILY BEFORE BREAKFAST, Disp: 90 tablet, Rfl: 1     QUEtiapine (SEROQUEL) 25 MG tablet, [QUETIAPINE (SEROQUEL) 25 MG TABLET] Take 25 mg by mouth at bedtime., Disp: , Rfl:      SF 5000 PLUS 1.1 % Crea, [SF 5000 PLUS 1.1 % CREA] Apply 1 application topically as  "needed., Disp: , Rfl: 0     SYMBICORT 80-4.5 MCG/ACT Inhaler, INHALE 2 PUFFS BY MOUTH TWICE DAILY. RINSE MOUTH /. GARGLE AFTER USE, Disp: , Rfl:      traZODone (DESYREL) 50 MG tablet, [TRAZODONE (DESYREL) 50 MG TABLET] at bedtime as needed., Disp: , Rfl:      venlafaxine (EFFEXOR-XR) 150 MG 24 hr capsule, [VENLAFAXINE (EFFEXOR-XR) 150 MG 24 HR CAPSULE] Take 1 capsule by mouth daily., Disp: , Rfl: 11   Surgical History     Past Surgical History  She has a past surgical history that includes Revision Jose Daniel-En-Y (4/23/2014); Cholecystectomy; Hysterectomy; tubal ligation; Total Knee Arthroplasty (Left, 03/2019); Arthroscopy knee with meniscal repair (Left, 01/2019); Esophagoscopy, gastroscopy, duodenoscopy (EGD), combined (N/A, 4/26/2022); Esophageal Balloon Provocation Study (N/A, 4/26/2022); and Esophagogastroduodenoscopy, With Botulinum Toxin Injection (4/26/2022).    Objective-Exam     Constitutional:  /68 (BP Location: Right arm, Patient Position: Sitting)   Ht 1.651 m (5' 5\")   Wt 98.7 kg (217 lb 9.6 oz)   BMI 36.21 kg/m      General:  Pleasant and in no acute distress   Eyes:  EOMI  ENT:  Airway 2+  Moist mucous membranes  Neck:  Supple, No LAD, No thyromegaly, No carotid bruits appreciated  Respiratory: Normal respiratory effort, no cough, wheezes or crackles  CV:  Regular rate and Rhythm,1-2/6 FREDERICK murmurs, pulses 2+, no calf tenderness, trace LE edema  Gastrointestinal: Abdomen NT/ND, BS+  Musculoskeletal: muscle mass WNL  Skin: color tan hair full, incisions nicely healed  Neurological: No tremor, normal gait  Psychiatric: alert and oriented X3, mood and affect normal    Counseling     We reviewed the important post op bariatric recommendations:  -eating 3 meals daily  -eating protein first, getting >60gm protein daily  -eating slowly, chewing food well  -avoiding/limiting calorie containing beverages  -drinking water 15-30 minutes before or after meals  -choosing wheat, not white with breads, " crackers, pastas, micheal, bagels, tortillas, rice  -limiting restaurant or cafeteria eating to twice a week or less    We discussed the importance of restorative sleep and stress management in maintaining a healthy weight.  We discussed the National Weight Control Registry healthy weight maintenance strategies and ways to optimize metabolism.  We discussed the importance of physical activity including cardiovascular and strength training in maintaining a healthier weight.    We discussed the importance of life-long vitamin supplementation and life-long  follow-up.    Yuliana was reminded that, to avoid marginal ulcers she should avoid tobacco at all, alcohol in excess, caffeine in excess, and NSAIDS (unless indicated for cardioprotection or othewise and opposed by a PPI).    Dorothy Coronado MD, MD, [facility].  6/6/2023  11:54 AM      No images are attached to the encounter.  Total time spent on the date of this encounter doing: chart review, review of test results, patient visit, physical exam, education, counseling, developing plan of care, and documenting = 30 minutes.      Again, thank you for allowing me to participate in the care of your patient.        Sincerely,        Dorothy Coronado MD     Olanzapine Pregnancy And Lactation Text: This medication is pregnancy category C.   There are no adequate and well controlled trials with olanzapine in pregnant females.  Olanzapine should be used during pregnancy only if the potential benefit justifies the potential risk to the fetus.   In a study in lactating healthy women, olanzapine was excreted in breast milk.  It is recommended that women taking olanzapine should not breast feed.

## 2023-06-06 NOTE — PROGRESS NOTES
Bariatric Follow Up Visit with a History of Previous Bariatric Surgery     Date of visit: 6/6/2023  Physician: Dorothy Coronado MD, MD  Primary Care Provider:  Mayda Mccray SATHISH Marcello   62 year old  female    Date of Surgery: 4/23/2014  Initial Weight: 231#  Initial BMI: 38.9  Today's Weight:   Wt Readings from Last 1 Encounters:   06/06/23 98.7 kg (217 lb 9.6 oz)     Body mass index is 36.21 kg/m .      Assessment and Plan     Assessment: Yuliana is a 62 year old year old female who is 9 yrs s/p  Jose Daniel en Y Gastric Bypass with Dr. Laura Armendariz feels as if she has achieved the goals she hoped to accomplish through bariatric surgery and weight loss.    Encounter Diagnoses   Name Primary?     Postoperative malabsorption Yes     Hepatic steatosis      Obesity (BMI 30.0-34.9)      Hyperphagia          Current Outpatient Medications:      acetaminophen (TYLENOL) 500 MG tablet, Take 500-1,000 mg by mouth as needed, Disp: , Rfl:      ADVAIR -21 mcg/actuation inhaler, [ADVAIR -21 MCG/ACTUATION INHALER] Inhale 1 puff 2 (two) times a day., Disp: , Rfl: 11     albuterol (PROVENTIL HFA;VENTOLIN HFA) 90 mcg/actuation inhaler, [ALBUTEROL (PROVENTIL HFA;VENTOLIN HFA) 90 MCG/ACTUATION INHALER] Inhale 2 puffs every 6 (six) hours as needed for wheezing., Disp: , Rfl:      albuterol (PROVENTIL) 2.5 mg /3 mL (0.083 %) nebulizer solution, [ALBUTEROL (PROVENTIL) 2.5 MG /3 ML (0.083 %) NEBULIZER SOLUTION] Take 2.5 mg by nebulization every 6 (six) hours as needed for wheezing., Disp: , Rfl:      amLODIPine (NORVASC) 5 MG tablet, [AMLODIPINE (NORVASC) 5 MG TABLET] Take 2.5 mg by mouth daily. , Disp: , Rfl:      atorvastatin (LIPITOR) 20 MG tablet, [ATORVASTATIN (LIPITOR) 20 MG TABLET] Take 20 mg by mouth bedtime., Disp: , Rfl:      CALCIUM CITRATE/VITAMIN D3 (CITRACAL + D PETITES ORAL), [CALCIUM CITRATE/VITAMIN D3 (CITRACAL + D PETITES ORAL)] Take 2 tablets by mouth 2 (two) times a day. , Disp: ,  Rfl:      carvedilol (COREG) 3.125 MG tablet, [CARVEDILOL (COREG) 3.125 MG TABLET] Take 3.125 mg by mouth bedtime. , Disp: , Rfl:      cholecalciferol (VITAMIN D3) 125 mcg (5000 units) capsule, Take 125 mcg by mouth daily, Disp: , Rfl:      clotrimazole (LOTRIMIN) 1 % cream, [CLOTRIMAZOLE (LOTRIMIN) 1 % CREAM] Apply 1 application topically 2 (two) times a day., Disp: , Rfl: 1     CONTOUR NEXT TEST STRIPS strips, [CONTOUR NEXT TEST STRIPS STRIPS] daily. TEST ONCE DAILY, Disp: , Rfl:      cyanocobalamin, vitamin B-12, 1,000 mcg Subl, [CYANOCOBALAMIN, VITAMIN B-12, 1,000 MCG SUBL] Place 1 tablet (1,000 mcg total) under the tongue daily., Disp: 90 tablet, Rfl: prn     diltiazem (CARDIZEM) 60 MG tablet, Take 1 tablet by mouth 4 times daily, Disp: , Rfl:      fluocinonide (LIDEX) 0.05 % cream, [FLUOCINONIDE (LIDEX) 0.05 % CREAM] Apply 1 application topically 2 (two) times a day., Disp: , Rfl:      gabapentin (NEURONTIN) 100 MG capsule, Take 100 mg by mouth once Unsure of does, Disp: , Rfl:      hydrocortisone 2.5 % cream, [HYDROCORTISONE 2.5 % CREAM] Apply 1 application topically as needed., Disp: , Rfl:      levETIRAcetam (KEPPRA) 1000 MG tablet, [LEVETIRACETAM (KEPPRA) 1000 MG TABLET] Take 1 tablet by mouth 2 (two) times a day., Disp: , Rfl: 3     losartan (COZAAR) 50 MG tablet, [LOSARTAN (COZAAR) 50 MG TABLET] Take 50 mg by mouth daily. Two tabs daily, Disp: , Rfl:      miconazole (MICOTIN) 2 % powder, [MICONAZOLE (MICOTIN) 2 % POWDER] Apply 1 application topically as needed for itching (for rash under breasts)., Disp: , Rfl:      montelukast (SINGULAIR) 10 mg tablet, [MONTELUKAST (SINGULAIR) 10 MG TABLET] Take 10 mg by mouth bedtime., Disp: , Rfl:      omeprazole (PRILOSEC) 20 MG capsule, Take 20 mg by mouth 2 times daily, Disp: , Rfl:      pediatric multivit-iron-min (CEROVITE JR) Chew, [PEDIATRIC MULTIVIT-IRON-MIN (CEROVITE JR) CHEW] Chew 1 tablet 2 (two) times a day., Disp: 180 each, Rfl: 3     phentermine  "(ADIPEX-P) 37.5 MG tablet, TAKE HALF TO ONE TABLET BY MOUTH DAILY BEFORE BREAKFAST, Disp: 90 tablet, Rfl: 1     QUEtiapine (SEROQUEL) 25 MG tablet, [QUETIAPINE (SEROQUEL) 25 MG TABLET] Take 25 mg by mouth at bedtime., Disp: , Rfl:      SF 5000 PLUS 1.1 % Crea, [SF 5000 PLUS 1.1 % CREA] Apply 1 application topically as needed., Disp: , Rfl: 0     SYMBICORT 80-4.5 MCG/ACT Inhaler, INHALE 2 PUFFS BY MOUTH TWICE DAILY. RINSE MOUTH /. GARGLE AFTER USE, Disp: , Rfl:      traZODone (DESYREL) 50 MG tablet, [TRAZODONE (DESYREL) 50 MG TABLET] at bedtime as needed., Disp: , Rfl:      venlafaxine (EFFEXOR-XR) 150 MG 24 hr capsule, [VENLAFAXINE (EFFEXOR-XR) 150 MG 24 HR CAPSULE] Take 1 capsule by mouth daily., Disp: , Rfl: 11     Plan: Continue vitamins with consistency. Refill phentermine. Dietitian prn.    No follow-ups on file.    Bariatric Surgery Review     Interim History/LifeChanges: Maintains a 14# weight loss. Had her shoulder surgery (2nd one) and was in [place] for Sepsis. Also having low back pain d/t disc herniations. Rashes under the skin folds. Ha antifungal RX from Dr. Nate Cash. Going to [facility]    Patient Concerns: labs  Appetite (1-10): OK/low  GERD: on PPI    Medication changes: no    Vitamin Intake:   B-12   SL   MVI  2/d   Vitamin D  5,000   Calcium   citrate     Other                LABS: \"Reviewed   A1c 6.1,     Nausea rare  Vomiting rare  Constipation no  Diarrhea no  Rashes yes!  Hair Loss no  Calf tenderness no  Breathing difficulty no  Reactive Hypoglycemia no  Light Headedness no   Moods tired out more than anything    12 point ROS as above and otherwise negative      Habits:  Alcohol: wine  Tobacco: no  Caffeine occ  NSAIDS no  Exercise Routine: walks the dog  3 meals/day yes  Protein first yes  ? grams/day  Water Separate from meals   Calorie Containing Beverages no  Restaurant eating/wk <2  Sleeping with trazodone 7-8 hour  Stress high  CPAP: not using  Contraception: PM  DEXA:stable LBM    Social " History     Social History     Socioeconomic History     Marital status: Single     Spouse name: Not on file     Number of children: Not on file     Years of education: Not on file     Highest education level: Not on file   Occupational History     Not on file   Tobacco Use     Smoking status: Former     Smokeless tobacco: Never     Tobacco comments:     quit over 25 years ago   Vaping Use     Vaping status: Not on file   Substance and Sexual Activity     Alcohol use: Yes     Comment: Alcoholic Drinks/day: Rarely     Drug use: No     Sexual activity: Not Currently     Partners: Male     Birth control/protection: Surgical   Other Topics Concern     Not on file   Social History Narrative    Single. Lives with her dog     Social Determinants of Health     Financial Resource Strain: Not on file   Food Insecurity: Not on file   Transportation Needs: Not on file   Physical Activity: Not on file   Stress: Not on file   Social Connections: Not on file   Intimate Partner Violence: Not At Risk (2/17/2022)    Humiliation, Afraid, Rape, and Kick questionnaire      Fear of Current or Ex-Partner: No      Emotionally Abused: No      Physically Abused: No      Sexually Abused: No   Housing Stability: Not on file       Past Medical History     Past Medical History:   Diagnosis Date     Asthma      Back pain      Congestive heart failure (H)      COPD (chronic obstructive pulmonary disease) (H)      Degenerative disc disease      Diabetes type 2, controlled (H)      Dyslipidemia      Esophageal dyskinesia      GERD (gastroesophageal reflux disease)      Gout      Hepatic steatosis      Hyperparathyroidism (H)      Hyperparathyroidism, secondary (H)      Hypertension      Low bone mass      Metabolic syndrome      Morbid obesity (H)      Osteoarthritis      Reflux      Problem List     Patient Active Problem List   Diagnosis     Congestive heart failure, unspecified HF chronicity, unspecified heart failure type (H)     Esophageal  dyskinesia     Non-cardiac chest pain     Regurgitation of food     Esophageal dysphagia     Morbid obesity (H)     Elevated TSH     Elevated liver enzymes     Alkaline phosphatase elevation     Hepatic steatosis     Medications       Current Outpatient Medications:      acetaminophen (TYLENOL) 500 MG tablet, Take 500-1,000 mg by mouth as needed, Disp: , Rfl:      ADVAIR -21 mcg/actuation inhaler, [ADVAIR -21 MCG/ACTUATION INHALER] Inhale 1 puff 2 (two) times a day., Disp: , Rfl: 11     albuterol (PROVENTIL HFA;VENTOLIN HFA) 90 mcg/actuation inhaler, [ALBUTEROL (PROVENTIL HFA;VENTOLIN HFA) 90 MCG/ACTUATION INHALER] Inhale 2 puffs every 6 (six) hours as needed for wheezing., Disp: , Rfl:      albuterol (PROVENTIL) 2.5 mg /3 mL (0.083 %) nebulizer solution, [ALBUTEROL (PROVENTIL) 2.5 MG /3 ML (0.083 %) NEBULIZER SOLUTION] Take 2.5 mg by nebulization every 6 (six) hours as needed for wheezing., Disp: , Rfl:      amLODIPine (NORVASC) 5 MG tablet, [AMLODIPINE (NORVASC) 5 MG TABLET] Take 2.5 mg by mouth daily. , Disp: , Rfl:      atorvastatin (LIPITOR) 20 MG tablet, [ATORVASTATIN (LIPITOR) 20 MG TABLET] Take 20 mg by mouth bedtime., Disp: , Rfl:      CALCIUM CITRATE/VITAMIN D3 (CITRACAL + D PETITES ORAL), [CALCIUM CITRATE/VITAMIN D3 (CITRACAL + D PETITES ORAL)] Take 2 tablets by mouth 2 (two) times a day. , Disp: , Rfl:      carvedilol (COREG) 3.125 MG tablet, [CARVEDILOL (COREG) 3.125 MG TABLET] Take 3.125 mg by mouth bedtime. , Disp: , Rfl:      cholecalciferol (VITAMIN D3) 125 mcg (5000 units) capsule, Take 125 mcg by mouth daily, Disp: , Rfl:      clotrimazole (LOTRIMIN) 1 % cream, [CLOTRIMAZOLE (LOTRIMIN) 1 % CREAM] Apply 1 application topically 2 (two) times a day., Disp: , Rfl: 1     CONTOUR NEXT TEST STRIPS strips, [CONTOUR NEXT TEST STRIPS STRIPS] daily. TEST ONCE DAILY, Disp: , Rfl:      cyanocobalamin, vitamin B-12, 1,000 mcg Subl, [CYANOCOBALAMIN, VITAMIN B-12, 1,000 MCG SUBL] Place 1 tablet  (1,000 mcg total) under the tongue daily., Disp: 90 tablet, Rfl: prn     diltiazem (CARDIZEM) 60 MG tablet, Take 1 tablet by mouth 4 times daily, Disp: , Rfl:      fluocinonide (LIDEX) 0.05 % cream, [FLUOCINONIDE (LIDEX) 0.05 % CREAM] Apply 1 application topically 2 (two) times a day., Disp: , Rfl:      gabapentin (NEURONTIN) 100 MG capsule, Take 100 mg by mouth once Unsure of does, Disp: , Rfl:      hydrocortisone 2.5 % cream, [HYDROCORTISONE 2.5 % CREAM] Apply 1 application topically as needed., Disp: , Rfl:      levETIRAcetam (KEPPRA) 1000 MG tablet, [LEVETIRACETAM (KEPPRA) 1000 MG TABLET] Take 1 tablet by mouth 2 (two) times a day., Disp: , Rfl: 3     losartan (COZAAR) 50 MG tablet, [LOSARTAN (COZAAR) 50 MG TABLET] Take 50 mg by mouth daily. Two tabs daily, Disp: , Rfl:      miconazole (MICOTIN) 2 % powder, [MICONAZOLE (MICOTIN) 2 % POWDER] Apply 1 application topically as needed for itching (for rash under breasts)., Disp: , Rfl:      montelukast (SINGULAIR) 10 mg tablet, [MONTELUKAST (SINGULAIR) 10 MG TABLET] Take 10 mg by mouth bedtime., Disp: , Rfl:      omeprazole (PRILOSEC) 20 MG capsule, Take 20 mg by mouth 2 times daily, Disp: , Rfl:      pediatric multivit-iron-min (CEROVITE JR) Chew, [PEDIATRIC MULTIVIT-IRON-MIN (CEROVITE JR) CHEW] Chew 1 tablet 2 (two) times a day., Disp: 180 each, Rfl: 3     phentermine (ADIPEX-P) 37.5 MG tablet, TAKE HALF TO ONE TABLET BY MOUTH DAILY BEFORE BREAKFAST, Disp: 90 tablet, Rfl: 1     QUEtiapine (SEROQUEL) 25 MG tablet, [QUETIAPINE (SEROQUEL) 25 MG TABLET] Take 25 mg by mouth at bedtime., Disp: , Rfl:      SF 5000 PLUS 1.1 % Crea, [SF 5000 PLUS 1.1 % CREA] Apply 1 application topically as needed., Disp: , Rfl: 0     SYMBICORT 80-4.5 MCG/ACT Inhaler, INHALE 2 PUFFS BY MOUTH TWICE DAILY. RINSE MOUTH /. GARGLE AFTER USE, Disp: , Rfl:      traZODone (DESYREL) 50 MG tablet, [TRAZODONE (DESYREL) 50 MG TABLET] at bedtime as needed., Disp: , Rfl:      venlafaxine (EFFEXOR-XR) 150  "MG 24 hr capsule, [VENLAFAXINE (EFFEXOR-XR) 150 MG 24 HR CAPSULE] Take 1 capsule by mouth daily., Disp: , Rfl: 11   Surgical History     Past Surgical History  She has a past surgical history that includes Revision Jose Daniel-En-Y (4/23/2014); Cholecystectomy; Hysterectomy; tubal ligation; Total Knee Arthroplasty (Left, 03/2019); Arthroscopy knee with meniscal repair (Left, 01/2019); Esophagoscopy, gastroscopy, duodenoscopy (EGD), combined (N/A, 4/26/2022); Esophageal Balloon Provocation Study (N/A, 4/26/2022); and Esophagogastroduodenoscopy, With Botulinum Toxin Injection (4/26/2022).    Objective-Exam     Constitutional:  /68 (BP Location: Right arm, Patient Position: Sitting)   Ht 1.651 m (5' 5\")   Wt 98.7 kg (217 lb 9.6 oz)   BMI 36.21 kg/m      General:  Pleasant and in no acute distress   Eyes:  EOMI  ENT:  Airway 2+  Moist mucous membranes  Neck:  Supple, No LAD, No thyromegaly, No carotid bruits appreciated  Respiratory: Normal respiratory effort, no cough, wheezes or crackles  CV:  Regular rate and Rhythm,1-2/6 FREDERICK murmurs, pulses 2+, no calf tenderness, trace LE edema  Gastrointestinal: Abdomen NT/ND, BS+  Musculoskeletal: muscle mass WNL  Skin: color tan hair full, incisions nicely healed  Neurological: No tremor, normal gait  Psychiatric: alert and oriented X3, mood and affect normal    Counseling     We reviewed the important post op bariatric recommendations:  -eating 3 meals daily  -eating protein first, getting >60gm protein daily  -eating slowly, chewing food well  -avoiding/limiting calorie containing beverages  -drinking water 15-30 minutes before or after meals  -choosing wheat, not white with breads, crackers, pastas, micheal, bagels, tortillas, rice  -limiting restaurant or cafeteria eating to twice a week or less    We discussed the importance of restorative sleep and stress management in maintaining a healthy weight.  We discussed the National Weight Control Registry healthy weight " maintenance strategies and ways to optimize metabolism.  We discussed the importance of physical activity including cardiovascular and strength training in maintaining a healthier weight.    We discussed the importance of life-long vitamin supplementation and life-long  follow-up.    Yuliana was reminded that, to avoid marginal ulcers she should avoid tobacco at all, alcohol in excess, caffeine in excess, and NSAIDS (unless indicated for cardioprotection or othewise and opposed by a PPI).    Dorothy Coronado MD, MD, [facility].  6/6/2023  11:54 AM      No images are attached to the encounter.  Total time spent on the date of this encounter doing: chart review, review of test results, patient visit, physical exam, education, counseling, developing plan of care, and documenting = 30 minutes.

## 2023-06-08 ENCOUNTER — VIRTUAL VISIT (OUTPATIENT)
Dept: SURGERY | Facility: CLINIC | Age: 63
End: 2023-06-08
Payer: COMMERCIAL

## 2023-06-08 DIAGNOSIS — E66.9 OBESITY (BMI 30-39.9): Primary | ICD-10-CM

## 2023-06-08 DIAGNOSIS — K91.2 POSTOPERATIVE MALABSORPTION: ICD-10-CM

## 2023-06-08 DIAGNOSIS — Z98.84 S/P BARIATRIC SURGERY: ICD-10-CM

## 2023-06-08 PROCEDURE — 97803 MED NUTRITION INDIV SUBSEQ: CPT | Mod: 93

## 2023-06-08 NOTE — PROGRESS NOTES
Yuliana Armendariz is a 62 year old who is being evaluated via a billable telephone     What phone number would you like to be contacted at? [phone]  How would you like to obtain your AVS? Mail a copy          Post-op Surgical Weight Loss Diet Evaluation     Assessment:  Pt presents for 6 years post-op RD visit, s/p RYGB with Dr. Sanchez. Today we reviewed current eating habits and level of physical activity, and instructed on the changes that are required for successful bariatric outcomes.    Weight loss medication: Phentermine     Patient Progress: Patient reports she has had re weight gain and started back on medication (phentermine). Noted that she does not eat often d/t lack of appetite. Is aware she is supposed to eat three meals per day. Noted she lost some family members in the last 6 years that has caused some grieving specifically in the months of Aril and July. Noted her main concern is lack of appetite.      Initial weight: 231 lbs  Current weight: 217 lbs  Weight change: 14 lbs lost     BMI: 36.21 kg/m2    Patient Active Problem List   Diagnosis     Congestive heart failure, unspecified HF chronicity, unspecified heart failure type (H)     Esophageal dyskinesia     Non-cardiac chest pain     Regurgitation of food     Esophageal dysphagia     Morbid obesity (H)     Elevated TSH     Elevated liver enzymes     Alkaline phosphatase elevation     Hepatic steatosis       Diabetes: none  HbgA1C: 6.1% (5/08/2023)    Vitamins   Multi Vit with Iron: yes  Calcium Citrate: yes  B12: yes  D3: yes    Do you experience hunger? No not lately    Nausea: no  Vomiting: no  Diarrhea: no  Constipation: yes - takes medication      Diet Recall/Time:   Breakfast: skips  Lunch: 1/2 of nutragrain bar  Dinner pm: chicken breast and with a couple wheat thins     Proteins/Veg/Fruits/CHO (NOT well tolerated): overly dry meat,     Estimated protein intake: less than 30 grams    Estimated portion size per meals: less than 1/2  "cup/meal      Meal Duration: 2 hours     Fluid-meal separation:  Fluids are usually  30min before and 30 minutes after meals.    Fluid Intake  Water: less than 60 oz   Caffeine: rarely   Alcohol: none  Carbonation: none    Exercise: PT for her back      PES statement:      (NC-1.4) Altered GI Function related to Alteration in gastrointestinal tract structure and/or function/ Decreased functional length of the GI tract as evidenced by Gastric bypass surgery.    (NB-1.7) Undesirable food choices related to Failure to adjust for lifestyle changes as evidenced by low protein intake at meals; large portions; skipping meals; frequent grazing;  mindless eating ; drinking with meals, not chewing each bite to applesauce consistency; consuming caffeine/carbonation daily, frequent restaurant intake; and no structured activity regimen      Intervention    Discussion  1. Discussed 18 months and beyond Post-Op Nutritional Guidelines for RYGB.  2. Recommended to consume 15-20gm protein at 3 meals daily, along with protein supplement/\"planned protein containing snack\" of 15-30gm protein, to reach goal of 60-80 gm protein daily.  3. Educated on post-op vitamin regimen: Multi Vit + iron 2x/day, calcium citrate 400-600 mg 2x/day, 0452-3240 mcg of Sublingual B-12 daily, and 5000 IU Vitamin D3 daily (MVI and calcium can be taken at the same time BID)  4. Reviewed lean protein sources  5. Bariatric Plate Method-  including lean/low fat protein at each meal, including a vegetable/fruit, and limiting carbohydrate intake to less than 25% of plate volume.     Instructions  1. Include 15-20gm protein at each meal, along with protein supplement/\"planned protein containing snack\" of 15-30gm protein, to reach goal of 60-80 gm protein daily.  2. Increase fluid intake to 64oz daily: choose plain or calorie/carbonation-free beverages.  3. Incorporate daily structured activity, 30-60 minutes most days of the week  4. Recommended pt to start " taking: Multi Vit + iron 2x/day, calcium citrate 400-600 mg 2x/day, 8107-7453 mcg of Sublingual B-12 daily, and 5000 IU Vitamin D3 daily. (MVI and calcium can be taken at the same time)  5. Read food labels more consistently: keeping total fat grams <10, total sugar grams <10, fiber >3gm per serving.  6. Increase vegetable/fruit intake, by having a vegetable or fruit with each meal daily.  7. Practice plate method: 1/2 plate lean/low fat protein source, vegetable/fruit, <25% of plate complex carbohydrates.  8. Separate fluids 30 minutes before/after meal times.  9. Practice eating off of smaller plates/bowls, chewing to applesauce consistency, taking 20-30 minutes to eat in a calm/relaxed environment without distractions of tv/email/cell phone.    Handouts provided:  18 months and beyond Post-Op Nutritional Guidelines for RYGB  Protein supplements     Assessment/Plan:    Pt to follow up with ISIAH BROWNING.      Phone call duration: 31 minutes    Originating Location (pt. Location): Home        Distant Location (provider location):  Off-site    Mode of Communication:  Phone Conference via [facility]    Physician has received verbal consent for a Video Visit from the patient? Yes    Debora Camacho RD

## 2023-06-08 NOTE — LETTER
6/8/2023         RE: Yuliana Armendariz  [address]  [facility] [address]        Dear Colleague,    Thank you for referring your patient, Yuliana Armendariz, to the [facility] AND [facility] [place]. Please see a copy of my visit note below.    Yuliana Armendariz is a 62 year old who is being evaluated via a billable telephone     What phone number would you like to be contacted at? [phone]  How would you like to obtain your AVS? Mail a copy          Post-op Surgical Weight Loss Diet Evaluation     Assessment:  Pt presents for 6 years post-op RD visit, s/p RYGB with Dr. Sanchez. Today we reviewed current eating habits and level of physical activity, and instructed on the changes that are required for successful bariatric outcomes.    Weight loss medication: Phentermine     Patient Progress: Patient reports she has had re weight gain and started back on medication (phentermine). Noted that she does not eat often d/t lack of appetite. Is aware she is supposed to eat three meals per day. Noted she lost some family members in the last 6 years that has caused some grieving specifically in the months of Aril and July. Noted her main concern is lack of appetite.      Initial weight: 231 lbs  Current weight: 217 lbs  Weight change: 14 lbs lost     BMI: 36.21 kg/m2    Patient Active Problem List   Diagnosis     Congestive heart failure, unspecified HF chronicity, unspecified heart failure type (H)     Esophageal dyskinesia     Non-cardiac chest pain     Regurgitation of food     Esophageal dysphagia     Morbid obesity (H)     Elevated TSH     Elevated liver enzymes     Alkaline phosphatase elevation     Hepatic steatosis       Diabetes: none  HbgA1C: 6.1% (5/08/2023)    Vitamins   Multi Vit with Iron: yes  Calcium Citrate: yes  B12: yes  D3: yes    Do you experience hunger? No not lately    Nausea: no  Vomiting: no  Diarrhea: no  Constipation: yes - takes medication      Diet  "Recall/Time:   Breakfast: skips  Lunch: 1/2 of [employer] bar  Dinner pm: chicken breast and with a couple wheat thins     Proteins/Veg/Fruits/CHO (NOT well tolerated): overly dry meat,     Estimated protein intake: less than 30 grams    Estimated portion size per meals: less than 1/2 cup/meal      Meal Duration: 2 hours     Fluid-meal separation:  Fluids are usually  30min before and 30 minutes after meals.    Fluid Intake  Water: less than 60 oz   Caffeine: rarely   Alcohol: none  Carbonation: none    Exercise: PT for her back      PES statement:      (NC-1.4) Altered GI Function related to Alteration in gastrointestinal tract structure and/or function/ Decreased functional length of the GI tract as evidenced by Gastric bypass surgery.    (NB-1.7) Undesirable food choices related to Failure to adjust for lifestyle changes as evidenced by low protein intake at meals; large portions; skipping meals; frequent grazing;  mindless eating ; drinking with meals, not chewing each bite to applesauce consistency; consuming caffeine/carbonation daily, frequent restaurant intake; and no structured activity regimen      Intervention    Discussion  1. Discussed 18 months and beyond Post-Op Nutritional Guidelines for RYGB.  2. Recommended to consume 15-20gm protein at 3 meals daily, along with protein supplement/\"planned protein containing snack\" of 15-30gm protein, to reach goal of 60-80 gm protein daily.  3. Educated on post-op vitamin regimen: Multi Vit + iron 2x/day, calcium citrate 400-600 mg 2x/day, 1099-6959 mcg of Sublingual B-12 daily, and 5000 IU Vitamin D3 daily (MVI and calcium can be taken at the same time BID)  4. Reviewed lean protein sources  5. Bariatric Plate Method-  including lean/low fat protein at each meal, including a vegetable/fruit, and limiting carbohydrate intake to less than 25% of plate volume.     Instructions  1. Include 15-20gm protein at each meal, along with protein supplement/\"planned " "protein containing snack\" of 15-30gm protein, to reach goal of 60-80 gm protein daily.  2. Increase fluid intake to 64oz daily: choose plain or calorie/carbonation-free beverages.  3. Incorporate daily structured activity, 30-60 minutes most days of the week  4. Recommended pt to start taking: Multi Vit + iron 2x/day, calcium citrate 400-600 mg 2x/day, 2794-0266 mcg of Sublingual B-12 daily, and 5000 IU Vitamin D3 daily. (MVI and calcium can be taken at the same time)  5. Read food labels more consistently: keeping total fat grams <10, total sugar grams <10, fiber >3gm per serving.  6. Increase vegetable/fruit intake, by having a vegetable or fruit with each meal daily.  7. Practice plate method: 1/2 plate lean/low fat protein source, vegetable/fruit, <25% of plate complex carbohydrates.  8. Separate fluids 30 minutes before/after meal times.  9. Practice eating off of smaller plates/bowls, chewing to applesauce consistency, taking 20-30 minutes to eat in a calm/relaxed environment without distractions of tv/email/cell phone.    Handouts provided:  18 months and beyond Post-Op Nutritional Guidelines for RYGB  Protein supplements     Assessment/Plan:    Pt to follow up with ISIAH BROWNING.      Phone call duration: 31 minutes    Originating Location (pt. Location): Home        Distant Location (provider location):  Off-site    Mode of Communication:  Phone Conference via [facility]    Physician has received verbal consent for a Video Visit from the patient? Yes    Debora Camacho RD              Again, thank you for allowing me to participate in the care of your patient.        Sincerely,        Debora Camacho RD    "

## 2023-09-05 ENCOUNTER — TELEPHONE (OUTPATIENT)
Dept: GASTROENTEROLOGY | Facility: CLINIC | Age: 63
End: 2023-09-05
Payer: COMMERCIAL

## 2023-09-05 NOTE — TELEPHONE ENCOUNTER
Called to remind patient of their upcoming appointment with our GI clinic, on Monday, September 11th at 1:25pm with Dr. Parminder Lynch. This appointment is scheduled as an in-person appt. Please arrive 15 minutes early to check in for your appointment. , if your appointment is virtual (video or telephone) you need to be in [place] for the visit. To reschedule or cancel patient to call [phone].    Tamara Quevedo

## 2023-09-11 ENCOUNTER — OFFICE VISIT (OUTPATIENT)
Dept: GASTROENTEROLOGY | Facility: CLINIC | Age: 63
End: 2023-09-11
Attending: INTERNAL MEDICINE
Payer: COMMERCIAL

## 2023-09-11 VITALS
DIASTOLIC BLOOD PRESSURE: 75 MMHG | SYSTOLIC BLOOD PRESSURE: 119 MMHG | BODY MASS INDEX: 36.22 KG/M2 | HEIGHT: 65 IN | WEIGHT: 217.4 LBS | HEART RATE: 80 BPM | OXYGEN SATURATION: 98 %

## 2023-09-11 DIAGNOSIS — K22.4 JACKHAMMER ESOPHAGUS: ICD-10-CM

## 2023-09-11 DIAGNOSIS — R13.19 ESOPHAGEAL DYSPHAGIA: ICD-10-CM

## 2023-09-11 PROCEDURE — 99215 OFFICE O/P EST HI 40 MIN: CPT | Performed by: INTERNAL MEDICINE

## 2023-09-11 ASSESSMENT — PAIN SCALES - GENERAL: PAINLEVEL: NO PAIN (0)

## 2023-09-11 NOTE — NURSING NOTE
"Chief Complaint   Patient presents with    Follow Up       Vitals:    09/11/23 1319   BP: 119/75   Pulse: 80   SpO2: 98%   Weight: 98.6 kg (217 lb 6.4 oz)   Height: 1.651 m (5' 5\")       Body mass index is 36.18 kg/m .    Charlotte King MA    "

## 2023-09-11 NOTE — LETTER
9/11/2023         RE: Yuliana Armendariz  [address]  [place] [address]        Dear Colleague,    Thank you for referring your patient, Yuliana Armendariz, to the [facility] [place]. Please see a copy of my visit note below.      Gastroenterology Visit for: Yuliana Armendariz 1960   MRN: [id number]     Reason for Visit:  chief complaint    Referred by: Syed  / [address] [place] [address] / [facility] [id number]  Patient Care Team:  Luan Mccray MD as PCP - General  Parminder Lynch DO as MD (Gastroenterology)  Parminder Lynch DO as Assigned Gastroenterology Provider    History of Present Illness:   Yuliana Armendariz is a 63 year old female with asthma/COPD, GIORGIO with CPAP, HTN, HLD, CHF (No CVA or MI), seizure disorder (on keppra almost 10 years, bariatric surgery (onesimo-en-y 4/23/2014), dysphonia who is presenting as a follow up patient in consultation at the request of Dr. Nino ([facility]) with a chief complaint of dysphagia and hypercontractile esophagus.    9/11/23  Spinal stenosis new diagnosis. She has been getting physical therapy. Recently had two rotator cuff repairs. She believes she may require additional procedures.     States she still has a problem with food getting caught. This is occurring daily. Not as bad as it was when we first met. She is starting to get acid reflux and is on that daily. After food gets stuck she will drink water and it will slowly go down. Occasionally she will feel it sit in the esophagus. Does not eat large portions because of gastric bypass. Eats minimal bread. Avoids food with skin. Eats a lot of chicken and fish. +dietary changes: chewing thoroughly.     Patient states she has an upcoming colonoscopy scheduled at [facility].     See updated BEDQ and Eckardt score below: These patient reported outcomes are pertinent to the HPI and have personally been reviewed.  ----------------------------------------------  Interval  "History 3/6/2023:   The patient states that she has been doing well. Every so often she has difficulty. 1-2 times a week or month she may have the sensation that food gets caught. Continues to take omeprazole. The patient had regurgitation this morning. Botox appears to have helped and the improvement has been sustained. The patient states that her daughter has achalasia and is concerned about if she has achalasia or not. Patient states that she has early satiety. She denies weight loss due to dysphagia.       See updated BEDQ and eckardt score.   ---------------------------------------------------------------  3/29/2022 Interval History:  Yuliana Armendariz states her main symptoms are chest pain and regurgitation along with dysphagia.    Not currently taking omeprazole since taking diltiazem. Patient states that she has heartburn which is worse now that she is off of her PPI. The patient has a history of bariatric surgery. Her symptoms started with non-cardiac chest pain and regurgitation. She feels that food will get stuck and then regurgitate after 30-40 minutes. She can sip room temperature water to help with food that gets stuck. Cold water causes more discomfort. She occasionally drinks ginger ale to see if that helps \"break up acid\". She chews food very thoroughly. Barely eats bread because of symptoms. She is scared to eat foods because of it getting caught. Sweet potatoes and baked chicken.     Her symptoms have been ongoing for \"years\" but now it is worse. Worsening over the past year.     Has had prior endoscopy with balloon dilation to 19mm for a non-obstructing schatzki ring. This did not improve her symptoms. She has been on diltiazem and peppermint without relief.     Daughter diagnosed with achalasia and underwent surgery. She was diagnosed when she was in high school. Father of her daughter has dysphagia as well. Patient does not know of anyone on her side of the family    Patient feels that she has " lost approximately 35 pounds however her objective weight is 225lbs.  ---------------------------------------------------------------     Yuliana L Marcello denies  odynophagia,  nausea, vomiting, early satiety, abdominal pain, abdominal distension/bloating, diarrhea, constipation, hematochezia, or melena. No unintentional weight loss.     Wt Readings from Last 5 Encounters:   09/11/23 98.6 kg (217 lb 6.4 oz)   06/06/23 98.7 kg (217 lb 9.6 oz)   04/26/22 101.5 kg (223 lb 12.3 oz)   04/21/22 101.2 kg (223 lb)   03/24/22 102.1 kg (225 lb)        Esophageal Questionnaire(s)    BEDQ Questionnaire      2/17/2022    10:09 AM 3/29/2022     7:00 AM 3/6/2023     1:24 PM 9/11/2023     1:00 PM   BEDQ Questionnaire: How Often Have You Had the Following?   Trouble eating solid food (meat, bread, vegetables) 5 4 2 5   Trouble eating soft foods (yogurt, jello, pudding) 0 2 0 0   Trouble swallowing liquids 0 0 0 0   Pain while swallowing 2 4 1 0   Coughing or choking while swallowing foods or liquids 0 5 0 0   Total Score: 7 15 3 5         2/17/2022    10:09 AM 3/29/2022     7:00 AM 3/6/2023     1:24 PM 9/11/2023     1:00 PM   BEDQ Questionnaire: Discomfort/Pain Ratings   Eating solid food (meat, bread, vegetables) 5 3 2 3   Eating soft foods (yogurt, jello, pudding) 0 3 0 0   Drinking liquid 0 0 0 0   Total Score: 5 6 2 3       Eckardt Questionnaire      2/17/2022    10:09 AM 3/29/2022     7:00 AM 3/6/2023     1:25 PM 9/11/2023     1:00 PM   Eckardt Questionnaire   Dysphagia 3 3 1 2   Regurgitation 1 1 1 0   Retrosternal Pain 2 1 0 1   Weight Loss (kg) 0 0 0 0   Total Score:  6 5 2 3       Promis 10 Questionnaire      2/17/2022    10:09 AM 3/29/2022     7:00 AM 3/6/2023     1:28 PM   PROMIS 10 FLOWSHEET DATA   In general, would you say your health is: 2 2 2   In general, would you say your quality of life is: 3 3 2   In general, how would you rate your physical health? 3 2 2   In general, how would you rate your mental health,  including your mood and your ability to think? 5 5 2   In general, how would you rate your satisfaction with your social activities and relationships? 5 5 2   In general, please rate how well you carry out your usual social activities and roles. (This includes activities at home, at work and in your community, and responsibilities as a parent, child, spouse, employee, friend, etc.) 5 5 2   To what extent are you able to carry out your everyday physical activities such as walking, climbing stairs, carrying groceries, or moving a chair? 3 3 4   In the past 7 days, how often have you been bothered by emotional problems such as feeling anxious, depressed, or irritable? 3 2 5   In the past 7 days, how would you rate your fatigue on average? 4 3 4   In the past 7 days, how would you rate your pain on average, where 0 means no pain, and 10 means worst imaginable pain? 8 8 10   Mental health question re-calculation - no clinical value 3 4 1   Physical health question re-calculation - no clinical value 2 3 2   Pain question re-calculation - no clinical value 2 2 1   Global Mental Health Score 16 17 7   Global Physical Health Score 10 10 9   PROMIS TOTAL - SUBSCORES 26 27 16           STUDIES & PROCEDURES:    EGD:     4/26/2022   Findings:        The examined esophagus was normal.        The Z-line was regular and was found 36 cm from the incisors. With the        patient in the left lateral decubitus position, a 16 cm long 3 mm        diameter functional lumen imaging probe (FLIP), with a volume-based        barostat bag, was placed at the lower esophageal sphincter without        endoscopic visualization for the diagnostic evaluation of dysphagia.        Saline was infused into the bag to a volume of 30 mL. Additional saline        was infused to a volume of 40 mL and Additional saline was infused to a        volume of 50 mL. FLIP Topography was performed using stepwise        distensions and demonstrated repetitive  antegrade contractions (RAC).        The catheter was deflated and withdrawn. SEE OP NOTE IN EPIC FOR FULL        DATA TABLE. Area was successfully injected with 100 units botulinum        toxin. All contractions were hypercontractile. Did not fill beyond 50ml        due to the significant contractile force.        Abnormal motility was noted in the esophagus. There is spasticity of the        esophageal body. Suggestive of hypercontractile esophagus.        One non-bleeding superficial gastric-jejunal anastomotic ulcer with no        stigmata of bleeding was found at the anastomosis. The lesion was 4 mm        in largest dimension. Biopsies were taken with a cold forceps for        histology. Verification of patient identification for the specimen was        done. Estimated blood loss: none.        The entire examined stomach was normal. Biopsies were taken with a cold        forceps for Helicobacter pylori testing. Verification of patient        identification for the specimen was done. Estimated blood loss: none.        The examined jejunum was normal.                                                                                     Impression:            - Normal esophagus.                          - Z-line regular, 36 cm from the incisors. Injected                          with botulinum toxin.                          - Abnormal esophageal motility, established esophageal                          spasm.                          - Non-bleeding gastric ulcer with no stigmata of                          bleeding. Biopsied.                          - Normal stomach. Biopsied.                          - Normal examined jejunum.                          - FLIP imaging performed, consistent with spastic                          esophageal motility disorder.     Final Diagnosis   A. STOMACH, BIOPSY AT ANASTOMOSIS:  Jejunal mucosa with small ulceration, neutrophils and other changes suggestive of peptic jejunitis; no  dysplasia or malignancy      B. STOMACH, BIOPSY:  Gastric body/fundic-type mucosa with PPI-like changes; no significant inflammation; negative for H. pylori, intestinal metaplasia, or dysplasia          Colonoscopy:  Date:  Impression:  Pathology Report:     EndoFLIP directed at the UES or LES (8cm (EF-325) balloon length or 16cm (EF-322) balloon length):   Date:  8cm balloon  Balloon inflation Balloon pressure CSA (mm^2) DI (mm^2/mmHg) Dmin (mm) Compliance   20 (ladmark ID)        30        40        50           EndoFLIP directed at the LES (16cm (EF-322) balloon length):   Date: 4/26/22     16cm balloon  Balloon inflation Balloon pressure CSA (mm^2) DI (mm^2/mmHg) Dmin (mm) Compliance   30 (ladmark ID) 36.1   9.78 21.2     40 66.2   8.58 26.9     50 77   8.84 29.4     60             70                           Hypercontractile RACs     High Resolution Manometry:  Date: 2/17/22  Impression:   The baseline tone of the lower esophageal sphincter was normotensive. The lower esophageal sphincter was not able to relax appropriately as measured by the IRP (17.0). The EGJ morphology was type 1. There was some increased pressurization below the LES which may be due to the catheter coiling either at the LES or in the gastric pouch post onesimo-en-y bypass. There was peristalsis visible. The DCI, DL, and contractile pattern were not within normal limits. 8/10 swallows were hypercontractile. There were 3/10 swallows with elevated intrabolus pressure and compartmentalized pressurization. Rapid water swallows were performed with less than normal augmentation. Rapid Drink Challenge does not indicate an elevated IRP. Supine liquid swallows were performed. Upright liquid swallows were performed with a median upright IRP of 8.9. All 5/5 swallows were hypercontractile and all 5 had intrabolus pressurization. Bolus transit as measured by impedance was complete in all 10/10 swallows. This is most consistent with hypercontractile  esophagus.       Wording of procedure description and interpretation was adapted from [facility] [employer] Weekly Motility Conference (2018).       Impressions   Based on the most recent [place] Classification v4.0, the findings are most consistent with hypercontractile esophagus. There is some intrabolus pressurization noted and an elevated supine IRP however this does not meet criteria for EGJ outflow obstruction based on the improved IRP seen on upright swallows. An endoscopy with endoFLIP may be warranted to maximally evaluate the EGJ prior to any possible non-medical intervention.     *Note, this study was performed while the patient was taking diltiazem.     Dr Parminder Lynch     PH/Impedance:  Date:  Impression:     Bravo:  48 or 96hr  Date:  Impression:    CT:  Date:  Impression:    Esophagram:  Date:  Impression:     Prior medical records were reviewed including, but not limited to, notes from referring providers, lab work, radiographic tests, and other diagnostic tests. Pertinent results were summarized above.     History     Past Medical History:   Diagnosis Date    Asthma     Back pain     Congestive heart failure (H)     COPD (chronic obstructive pulmonary disease) (H)     Degenerative disc disease     Diabetes type 2, controlled (H)     Dyslipidemia     Esophageal dyskinesia     GERD (gastroesophageal reflux disease)     Gout     Hepatic steatosis     Hyperparathyroidism (H)     Hyperparathyroidism, secondary (H)     Hypertension     Low bone mass     Metabolic syndrome     Morbid obesity (H)     Osteoarthritis     Reflux        Past Surgical History:   Procedure Laterality Date    ARTHROSCOPY KNEE WITH MENISCAL REPAIR Left 01/2019    CHOLECYSTECTOMY      ESOPHAGEAL BALLOON PROVOCATION STUDY N/A 4/26/2022    Procedure: ESOPHAGEAL BALLOON PROVOCATION STUDY;  Surgeon: Parminder Lynch DO;  Location:  GI    ESOPHAGOGASTRODUODENOSCOPY, WITH BOTULINUM TOXIN INJECTION  4/26/2022     Procedure: Esophagogastroduodenoscopy, With Botulinum Toxin Injection;  Surgeon: Parminder Lynch DO;  Location: [facility]    ESOPHAGOSCOPY, GASTROSCOPY, DUODENOSCOPY (EGD), COMBINED N/A 4/26/2022    Procedure: ESOPHAGOGASTRODUODENOSCOPY, WITH BIOPSY;  Surgeon: Parminder Lynch DO;  Location: [facility]    HYSTERECTOMY      REVISION RIAN-EN-Y  4/23/2014    [facility]    TOTAL KNEE ARTHROPLASTY Left 03/2019    TUBAL LIGATION         Social History     Socioeconomic History    Marital status: Single     Spouse name: Not on file    Number of children: Not on file    Years of education: Not on file    Highest education level: Not on file   Occupational History    Not on file   Tobacco Use    Smoking status: Former    Smokeless tobacco: Never    Tobacco comments:     quit over 25 years ago   Substance and Sexual Activity    Alcohol use: Yes     Comment: Alcoholic Drinks/day: Rarely    Drug use: No    Sexual activity: Not Currently     Partners: Male     Birth control/protection: Surgical   Other Topics Concern    Not on file   Social History Narrative    Single. Lives with her dog     Social Determinants of Health     Financial Resource Strain: Not on file   Food Insecurity: Not on file   Transportation Needs: Not on file   Physical Activity: Not on file   Stress: Not on file   Social Connections: Not on file   Intimate Partner Violence: Not At Risk (2/17/2022)    Humiliation, Afraid, Rape, and Kick questionnaire     Fear of Current or Ex-Partner: No     Emotionally Abused: No     Physically Abused: No     Sexually Abused: No   Housing Stability: Not on file       Family History   Problem Relation Age of Onset    Diabetes Mother     Heart Disease Mother     Hyperlipidemia Mother     Hypertension Mother      Family history reviewed and edited as appropriate    Medications and Allergies:     Outpatient Encounter Medications as of 9/11/2023   Medication Sig Dispense Refill    ADVAIR -21 mcg/actuation inhaler [ADVAIR -21  MCG/ACTUATION INHALER] Inhale 1 puff 2 (two) times a day.  11    albuterol (PROVENTIL HFA;VENTOLIN HFA) 90 mcg/actuation inhaler [ALBUTEROL (PROVENTIL HFA;VENTOLIN HFA) 90 MCG/ACTUATION INHALER] Inhale 2 puffs every 6 (six) hours as needed for wheezing.      albuterol (PROVENTIL) 2.5 mg /3 mL (0.083 %) nebulizer solution [ALBUTEROL (PROVENTIL) 2.5 MG /3 ML (0.083 %) NEBULIZER SOLUTION] Take 2.5 mg by nebulization every 6 (six) hours as needed for wheezing.      amLODIPine (NORVASC) 5 MG tablet [AMLODIPINE (NORVASC) 5 MG TABLET] Take 2.5 mg by mouth daily.       atorvastatin (LIPITOR) 20 MG tablet [ATORVASTATIN (LIPITOR) 20 MG TABLET] Take 20 mg by mouth bedtime.      CALCIUM CITRATE/VITAMIN D3 (CITRACAL + D PETITES ORAL) [CALCIUM CITRATE/VITAMIN D3 (CITRACAL + D PETITES ORAL)] Take 2 tablets by mouth 2 (two) times a day.       carvedilol (COREG) 3.125 MG tablet [CARVEDILOL (COREG) 3.125 MG TABLET] Take 3.125 mg by mouth bedtime.       cholecalciferol (VITAMIN D3) 125 mcg (5000 units) capsule Take 125 mcg by mouth daily      clotrimazole (LOTRIMIN) 1 % cream [CLOTRIMAZOLE (LOTRIMIN) 1 % CREAM] Apply 1 application topically 2 (two) times a day.  1    CONTOUR NEXT TEST STRIPS strips [CONTOUR NEXT TEST STRIPS STRIPS] daily. TEST ONCE DAILY      cyanocobalamin, vitamin B-12, 1,000 mcg Subl [CYANOCOBALAMIN, VITAMIN B-12, 1,000 MCG SUBL] Place 1 tablet (1,000 mcg total) under the tongue daily. 90 tablet prn    diltiazem (CARDIZEM) 60 MG tablet Take 1 tablet by mouth 4 times daily      fluocinonide (LIDEX) 0.05 % cream [FLUOCINONIDE (LIDEX) 0.05 % CREAM] Apply 1 application topically 2 (two) times a day.      gabapentin (NEURONTIN) 100 MG capsule Take 100 mg by mouth once Unsure of does      hydrocortisone 2.5 % cream [HYDROCORTISONE 2.5 % CREAM] Apply 1 application topically as needed.      losartan (COZAAR) 50 MG tablet [LOSARTAN (COZAAR) 50 MG TABLET] Take 50 mg by mouth daily. Two tabs daily      miconazole (MICOTIN) 2  % powder [MICONAZOLE (MICOTIN) 2 % POWDER] Apply 1 application topically as needed for itching (for rash under breasts).      montelukast (SINGULAIR) 10 mg tablet [MONTELUKAST (SINGULAIR) 10 MG TABLET] Take 10 mg by mouth bedtime.      omeprazole (PRILOSEC) 20 MG capsule Take 20 mg by mouth daily      pediatric multivit-iron-min (CEROVITE JR) Chew [PEDIATRIC MULTIVIT-IRON-MIN (CEROVITE JR) CHEW] Chew 1 tablet 2 (two) times a day. 180 each 3    phentermine (ADIPEX-P) 37.5 MG tablet TAKE HALF TO ONE TABLET BY MOUTH DAILY BEFORE BREAKFAST 90 tablet 1    QUEtiapine (SEROQUEL) 25 MG tablet [QUETIAPINE (SEROQUEL) 25 MG TABLET] Take 25 mg by mouth at bedtime.      SF 5000 PLUS 1.1 % Crea [SF 5000 PLUS 1.1 % CREA] Apply 1 application topically as needed.  0    SYMBICORT 80-4.5 MCG/ACT Inhaler INHALE 2 PUFFS BY MOUTH TWICE DAILY. RINSE MOUTH /. GARGLE AFTER USE      traZODone (DESYREL) 50 MG tablet [TRAZODONE (DESYREL) 50 MG TABLET] at bedtime as needed.      venlafaxine (EFFEXOR-XR) 150 MG 24 hr capsule [VENLAFAXINE (EFFEXOR-XR) 150 MG 24 HR CAPSULE] Take 1 capsule by mouth daily.  11    acetaminophen (TYLENOL) 500 MG tablet Take 500-1,000 mg by mouth as needed (Patient not taking: Reported on 9/11/2023)      levETIRAcetam (KEPPRA) 1000 MG tablet [LEVETIRACETAM (KEPPRA) 1000 MG TABLET] Take 1 tablet by mouth 2 (two) times a day. (Patient not taking: Reported on 9/11/2023)  3     No facility-administered encounter medications on file as of 9/11/2023.        Allergies   Allergen Reactions    Aspirin Other (See Comments)     Hx gastric bypass    Hydrocodone Other (See Comments)     Itching    Latex Unknown     Itchy    Nsaids Other (See Comments)     Hx of gastric bypass    Vicodin [Hydrocodone-Acetaminophen] Unknown     Itchy        Review of systems:  A full 10 point review of systems was obtained and was negative except for the pertinent positives and negatives stated within the HPI.    Objective Findings:   Physical Exam:   "  Constitutional: /75   Pulse 80   Ht 1.651 m (5' 5\")   Wt 98.6 kg (217 lb 6.4 oz)   SpO2 98%   BMI 36.18 kg/m    Physical Exam:    General: Alert, cooperative, no distress, well-appearing  Head: Atraumatic, normocephalic, no obvious abnormalities   Eyes: EOMI, Sclera anicteric, no obvious conjunctival hemorrhage   Nose: Nares normal, no obvious malformation, no obvious rhinorrhea   Musculoskeletal: Range of motion intact, no obvious strength deficit  Skin: No jaundice, no obvious rash  Neurologic: AAOx3, no obvious neurologic abnormality  Psychiatric: Normal Affect, appropriate mood  Extremities: No obvious edema, no obvious malformation        Labs, Radiology, Pathology     Lab Results   Component Value Date    WBC 10.1 05/08/2023    WBC 9.4 04/21/2022    WBC 9.9 09/23/2020    HGB 12.5 05/08/2023    HGB 12.5 04/21/2022    HGB 11.7 (L) 09/23/2020     05/08/2023     04/21/2022     09/23/2020    CHOL 181 05/08/2023    CHOL 181 04/21/2022    CHOL 204 (H) 06/06/2019    TRIG 109 05/08/2023    TRIG 105 04/21/2022    TRIG 166 (H) 06/06/2019    HDL 85 05/08/2023    HDL 92 04/21/2022    HDL 85 06/06/2019    ALT 56 (H) 05/08/2023    ALT 63 (H) 04/21/2022    ALT 63 (H) 03/08/2021    AST 94 (H) 05/08/2023     (H) 04/21/2022    AST 58 (H) 03/08/2021     05/08/2023     04/21/2022     03/08/2021    BUN 10.9 05/08/2023    BUN 8 04/21/2022    BUN 8 03/08/2021    CO2 27 05/08/2023    CO2 27 04/21/2022    CO2 33 (H) 03/08/2021    TSH 16.79 (H) 04/21/2022        Liver Function Studies -   Recent Labs   Lab Test 03/08/21  1425   PROTTOTAL 7.6   ALBUMIN 4.4   BILITOTAL 0.7   ALKPHOS 174*   AST 58*   ALT 63*          Patient Active Problem List    Diagnosis Date Noted    Hepatic steatosis 06/06/2023     Priority: Medium    Elevated TSH 04/27/2022     Priority: Medium    Elevated liver enzymes 04/27/2022     Priority: Medium    Alkaline phosphatase elevation 04/27/2022     Priority: " Medium    Morbid obesity (H) 04/21/2022     Priority: Medium    Congestive heart failure, unspecified HF chronicity, unspecified heart failure type (H) 03/29/2022     Priority: Medium    Esophageal dyskinesia 03/29/2022     Priority: Medium    Non-cardiac chest pain 03/29/2022     Priority: Medium    Regurgitation of food 03/29/2022     Priority: Medium    Esophageal dysphagia 03/29/2022     Priority: Medium      Assessment and Plan   Assessment:    Yuliana Armendariz is a 63 year old female with asthma/COPD, GIORGIO with CPAP, HTN, HLD, CHF (No CVA or MI), seizure disorder (on keppra almost 10 years, bariatric surgery (onesimo-en-y 4/23/2014), dysphonia who is presenting as a follow up patient in consultation at the request of Dr. Nino ([facility]) with a chief complaint of dysphagia and hypercontractile esophagus.    The patient was seen in telephone consultation regarding her symptoms of dysphagia and hypercontractile esophagus.      Today the patient is interesting in monitoring her symptoms of dysphagia rather than pursuing additional endoscopy with repeat botox. If symptoms worsen or change I suggested repeat endoscopy with botox given the significant benefit she had the first time around (4/2022). In the future we may wish to pursue definitive therapy. We discussed POEM as a future option. Also discussed that it is uncertain if she would be a candidate given prior bariatric surgery. Also discussed the concept of POSED (post obesity related esophageal dysmotility).     Plan:    Please continue to monitor your symptoms with respect to your swallowing.   The next step for your difficulty swallowing would be a repeat endoscopy with botox injection if you choose to pursue that.    Follow up plan:   Return to clinic 6 months and as needed.    The risks and benefits of my recommendations, as well as other treatment options were discussed with the patient and any available family today. All questions were answered.      Follow up: As planned above. Today, I personally spent 40 minutes in direct face to face time with the patient, of which greater than 50% of the time was spent in patient education and counseling as described above. Approximately 12 minutes were spent on indirect care associated with the patient's consultation including but not limited to review of: patient medical records to date, clinic visits, hospital records, lab results, imaging studies, procedural documentation, and coordinating care with other providers. The findings from this review are summarized in the above note. All of the above accounted for a cumulative time of 52 minutes and was performed on the date of service.     The patient verbalized understanding of the plan and was appreciative for the time spent and information provided during the office visit.     Author:   Parminder Lynch DO   of Medicine  Director, Esophageal Disorders Program  Division of Gastroenterology, Hepatology, and Nutrition  [facility]     Documentation assisted by voice recognition and documentation system.      Again, thank you for allowing me to participate in the care of your patient.      Sincerely,    Parminder Lynch DO

## 2023-09-11 NOTE — PROGRESS NOTES
Gastroenterology Visit for: Yuliana Armendariz 1960   MRN: [id number]     Reason for Visit:  chief complaint    Referred by: Syed  / [address] [place] [address] / [facility] [id number]  Patient Care Team:  Luan Mccray MD as PCP - General  Parminder Lynch DO as MD (Gastroenterology)  Parminder Lynch DO as Assigned Gastroenterology Provider    History of Present Illness:   Yuliana Armendariz is a 63 year old female with asthma/COPD, GIORGIO with CPAP, HTN, HLD, CHF (No CVA or MI), seizure disorder (on keppra almost 10 years, bariatric surgery (onesimo-en-y 4/23/2014), dysphonia who is presenting as a follow up patient in consultation at the request of Dr. Nino ([facility]) with a chief complaint of dysphagia and hypercontractile esophagus.    9/11/23  Spinal stenosis new diagnosis. She has been getting physical therapy. Recently had two rotator cuff repairs. She believes she may require additional procedures.     States she still has a problem with food getting caught. This is occurring daily. Not as bad as it was when we first met. She is starting to get acid reflux and is on that daily. After food gets stuck she will drink water and it will slowly go down. Occasionally she will feel it sit in the esophagus. Does not eat large portions because of gastric bypass. Eats minimal bread. Avoids food with skin. Eats a lot of chicken and fish. +dietary changes: chewing thoroughly.     Patient states she has an upcoming colonoscopy scheduled at [facility].     See updated BEDQ and Eckardt score below: These patient reported outcomes are pertinent to the HPI and have personally been reviewed.  ----------------------------------------------  Interval History 3/6/2023:   The patient states that she has been doing well. Every so often she has difficulty. 1-2 times a week or month she may have the sensation that food gets caught. Continues to take omeprazole. The patient had regurgitation this morning. Botox appears to have helped and  "the improvement has been sustained. The patient states that her daughter has achalasia and is concerned about if she has achalasia or not. Patient states that she has early satiety. She denies weight loss due to dysphagia.       See updated BEDQ and eckardt score.   ---------------------------------------------------------------  3/29/2022 Interval History:  Yuliana SATHISH Armendariz states her main symptoms are chest pain and regurgitation along with dysphagia.    Not currently taking omeprazole since taking diltiazem. Patient states that she has heartburn which is worse now that she is off of her PPI. The patient has a history of bariatric surgery. Her symptoms started with non-cardiac chest pain and regurgitation. She feels that food will get stuck and then regurgitate after 30-40 minutes. She can sip room temperature water to help with food that gets stuck. Cold water causes more discomfort. She occasionally drinks ginger ale to see if that helps \"break up acid\". She chews food very thoroughly. Barely eats bread because of symptoms. She is scared to eat foods because of it getting caught. Sweet potatoes and baked chicken.     Her symptoms have been ongoing for \"years\" but now it is worse. Worsening over the past year.     Has had prior endoscopy with balloon dilation to 19mm for a non-obstructing schatzki ring. This did not improve her symptoms. She has been on diltiazem and peppermint without relief.     Daughter diagnosed with achalasia and underwent surgery. She was diagnosed when she was in high school. Father of her daughter has dysphagia as well. Patient does not know of anyone on her side of the family    Patient feels that she has lost approximately 35 pounds however her objective weight is 225lbs.  ---------------------------------------------------------------     Yuliana Armendariz denies  odynophagia,  nausea, vomiting, early satiety, abdominal pain, abdominal distension/bloating, diarrhea, constipation, " hematochezia, or melena. No unintentional weight loss.     Wt Readings from Last 5 Encounters:   09/11/23 98.6 kg (217 lb 6.4 oz)   06/06/23 98.7 kg (217 lb 9.6 oz)   04/26/22 101.5 kg (223 lb 12.3 oz)   04/21/22 101.2 kg (223 lb)   03/24/22 102.1 kg (225 lb)        Esophageal Questionnaire(s)    BEDQ Questionnaire      2/17/2022    10:09 AM 3/29/2022     7:00 AM 3/6/2023     1:24 PM 9/11/2023     1:00 PM   BEDQ Questionnaire: How Often Have You Had the Following?   Trouble eating solid food (meat, bread, vegetables) 5 4 2 5   Trouble eating soft foods (yogurt, jello, pudding) 0 2 0 0   Trouble swallowing liquids 0 0 0 0   Pain while swallowing 2 4 1 0   Coughing or choking while swallowing foods or liquids 0 5 0 0   Total Score: 7 15 3 5         2/17/2022    10:09 AM 3/29/2022     7:00 AM 3/6/2023     1:24 PM 9/11/2023     1:00 PM   BEDQ Questionnaire: Discomfort/Pain Ratings   Eating solid food (meat, bread, vegetables) 5 3 2 3   Eating soft foods (yogurt, jello, pudding) 0 3 0 0   Drinking liquid 0 0 0 0   Total Score: 5 6 2 3       Eckardt Questionnaire      2/17/2022    10:09 AM 3/29/2022     7:00 AM 3/6/2023     1:25 PM 9/11/2023     1:00 PM   Eckardt Questionnaire   Dysphagia 3 3 1 2   Regurgitation 1 1 1 0   Retrosternal Pain 2 1 0 1   Weight Loss (kg) 0 0 0 0   Total Score:  6 5 2 3       Promis 10 Questionnaire      2/17/2022    10:09 AM 3/29/2022     7:00 AM 3/6/2023     1:28 PM   PROMIS 10 FLOWSHEET DATA   In general, would you say your health is: 2 2 2   In general, would you say your quality of life is: 3 3 2   In general, how would you rate your physical health? 3 2 2   In general, how would you rate your mental health, including your mood and your ability to think? 5 5 2   In general, how would you rate your satisfaction with your social activities and relationships? 5 5 2   In general, please rate how well you carry out your usual social activities and roles. (This includes activities at home, at  work and in your community, and responsibilities as a parent, child, spouse, employee, friend, etc.) 5 5 2   To what extent are you able to carry out your everyday physical activities such as walking, climbing stairs, carrying groceries, or moving a chair? 3 3 4   In the past 7 days, how often have you been bothered by emotional problems such as feeling anxious, depressed, or irritable? 3 2 5   In the past 7 days, how would you rate your fatigue on average? 4 3 4   In the past 7 days, how would you rate your pain on average, where 0 means no pain, and 10 means worst imaginable pain? 8 8 10   Mental health question re-calculation - no clinical value 3 4 1   Physical health question re-calculation - no clinical value 2 3 2   Pain question re-calculation - no clinical value 2 2 1   Global Mental Health Score 16 17 7   Global Physical Health Score 10 10 9   PROMIS TOTAL - SUBSCORES 26 27 16           STUDIES & PROCEDURES:    EGD:     4/26/2022   Findings:        The examined esophagus was normal.        The Z-line was regular and was found 36 cm from the incisors. With the        patient in the left lateral decubitus position, a 16 cm long 3 mm        diameter functional lumen imaging probe (FLIP), with a volume-based        barostat bag, was placed at the lower esophageal sphincter without        endoscopic visualization for the diagnostic evaluation of dysphagia.        Saline was infused into the bag to a volume of 30 mL. Additional saline        was infused to a volume of 40 mL and Additional saline was infused to a        volume of 50 mL. FLIP Topography was performed using stepwise        distensions and demonstrated repetitive antegrade contractions (RAC).        The catheter was deflated and withdrawn. SEE OP NOTE IN EPIC FOR FULL        DATA TABLE. Area was successfully injected with 100 units botulinum        toxin. All contractions were hypercontractile. Did not fill beyond 50ml        due to the significant  contractile force.        Abnormal motility was noted in the esophagus. There is spasticity of the        esophageal body. Suggestive of hypercontractile esophagus.        One non-bleeding superficial gastric-jejunal anastomotic ulcer with no        stigmata of bleeding was found at the anastomosis. The lesion was 4 mm        in largest dimension. Biopsies were taken with a cold forceps for        histology. Verification of patient identification for the specimen was        done. Estimated blood loss: none.        The entire examined stomach was normal. Biopsies were taken with a cold        forceps for Helicobacter pylori testing. Verification of patient        identification for the specimen was done. Estimated blood loss: none.        The examined jejunum was normal.                                                                                     Impression:            - Normal esophagus.                          - Z-line regular, 36 cm from the incisors. Injected                          with botulinum toxin.                          - Abnormal esophageal motility, established esophageal                          spasm.                          - Non-bleeding gastric ulcer with no stigmata of                          bleeding. Biopsied.                          - Normal stomach. Biopsied.                          - Normal examined jejunum.                          - FLIP imaging performed, consistent with spastic                          esophageal motility disorder.     Final Diagnosis   A. STOMACH, BIOPSY AT ANASTOMOSIS:  Jejunal mucosa with small ulceration, neutrophils and other changes suggestive of peptic jejunitis; no dysplasia or malignancy      B. STOMACH, BIOPSY:  Gastric body/fundic-type mucosa with PPI-like changes; no significant inflammation; negative for H. pylori, intestinal metaplasia, or dysplasia          Colonoscopy:  Date:  Impression:  Pathology Report:     EndoFLIP directed at the UES or  LES (8cm (EF-325) balloon length or 16cm (EF-322) balloon length):   Date:  8cm balloon  Balloon inflation Balloon pressure CSA (mm^2) DI (mm^2/mmHg) Dmin (mm) Compliance   20 (ladmark ID)        30        40        50           EndoFLIP directed at the LES (16cm (EF-322) balloon length):   Date: 4/26/22     16cm balloon  Balloon inflation Balloon pressure CSA (mm^2) DI (mm^2/mmHg) Dmin (mm) Compliance   30 (ladmark ID) 36.1   9.78 21.2     40 66.2   8.58 26.9     50 77   8.84 29.4     60             70                           Hypercontractile RACs     High Resolution Manometry:  Date: 2/17/22  Impression:   The baseline tone of the lower esophageal sphincter was normotensive. The lower esophageal sphincter was not able to relax appropriately as measured by the IRP (17.0). The EGJ morphology was type 1. There was some increased pressurization below the LES which may be due to the catheter coiling either at the LES or in the gastric pouch post onesimo-en-y bypass. There was peristalsis visible. The DCI, DL, and contractile pattern were not within normal limits. 8/10 swallows were hypercontractile. There were 3/10 swallows with elevated intrabolus pressure and compartmentalized pressurization. Rapid water swallows were performed with less than normal augmentation. Rapid Drink Challenge does not indicate an elevated IRP. Supine liquid swallows were performed. Upright liquid swallows were performed with a median upright IRP of 8.9. All 5/5 swallows were hypercontractile and all 5 had intrabolus pressurization. Bolus transit as measured by impedance was complete in all 10/10 swallows. This is most consistent with hypercontractile esophagus.       Wording of procedure description and interpretation was adapted from [facility] [employer] Weekly Motility Conference (2018).       Impressions   Based on the most recent [place] Classification v4.0, the findings are most consistent with hypercontractile  esophagus. There is some intrabolus pressurization noted and an elevated supine IRP however this does not meet criteria for EGJ outflow obstruction based on the improved IRP seen on upright swallows. An endoscopy with endoFLIP may be warranted to maximally evaluate the EGJ prior to any possible non-medical intervention.     *Note, this study was performed while the patient was taking diltiazem.     Dr Parminder Lynch     PH/Impedance:  Date:  Impression:     Bravo:  48 or 96hr  Date:  Impression:    CT:  Date:  Impression:    Esophagram:  Date:  Impression:     Prior medical records were reviewed including, but not limited to, notes from referring providers, lab work, radiographic tests, and other diagnostic tests. Pertinent results were summarized above.     History     Past Medical History:   Diagnosis Date    Asthma     Back pain     Congestive heart failure (H)     COPD (chronic obstructive pulmonary disease) (H)     Degenerative disc disease     Diabetes type 2, controlled (H)     Dyslipidemia     Esophageal dyskinesia     GERD (gastroesophageal reflux disease)     Gout     Hepatic steatosis     Hyperparathyroidism (H)     Hyperparathyroidism, secondary (H)     Hypertension     Low bone mass     Metabolic syndrome     Morbid obesity (H)     Osteoarthritis     Reflux        Past Surgical History:   Procedure Laterality Date    ARTHROSCOPY KNEE WITH MENISCAL REPAIR Left 01/2019    CHOLECYSTECTOMY      ESOPHAGEAL BALLOON PROVOCATION STUDY N/A 4/26/2022    Procedure: ESOPHAGEAL BALLOON PROVOCATION STUDY;  Surgeon: Parminder Lynch DO;  Location: [facility]    ESOPHAGOGASTRODUODENOSCOPY, WITH BOTULINUM TOXIN INJECTION  4/26/2022    Procedure: Esophagogastroduodenoscopy, With Botulinum Toxin Injection;  Surgeon: Parminder Lynch DO;  Location: [facility]    ESOPHAGOSCOPY, GASTROSCOPY, DUODENOSCOPY (EGD), COMBINED N/A 4/26/2022    Procedure: ESOPHAGOGASTRODUODENOSCOPY, WITH BIOPSY;  Surgeon: Parminder Lynch DO;  Location: [facility]     HYSTERECTOMY      REVISION RIAN-EN-Y  4/23/2014    [facility]    TOTAL KNEE ARTHROPLASTY Left 03/2019    TUBAL LIGATION         Social History     Socioeconomic History    Marital status: Single     Spouse name: Not on file    Number of children: Not on file    Years of education: Not on file    Highest education level: Not on file   Occupational History    Not on file   Tobacco Use    Smoking status: Former    Smokeless tobacco: Never    Tobacco comments:     quit over 25 years ago   Substance and Sexual Activity    Alcohol use: Yes     Comment: Alcoholic Drinks/day: Rarely    Drug use: No    Sexual activity: Not Currently     Partners: Male     Birth control/protection: Surgical   Other Topics Concern    Not on file   Social History Narrative    Single. Lives with her dog     Social Determinants of Health     Financial Resource Strain: Not on file   Food Insecurity: Not on file   Transportation Needs: Not on file   Physical Activity: Not on file   Stress: Not on file   Social Connections: Not on file   Intimate Partner Violence: Not At Risk (2/17/2022)    Humiliation, Afraid, Rape, and Kick questionnaire     Fear of Current or Ex-Partner: No     Emotionally Abused: No     Physically Abused: No     Sexually Abused: No   Housing Stability: Not on file       Family History   Problem Relation Age of Onset    Diabetes Mother     Heart Disease Mother     Hyperlipidemia Mother     Hypertension Mother      Family history reviewed and edited as appropriate    Medications and Allergies:     Outpatient Encounter Medications as of 9/11/2023   Medication Sig Dispense Refill    ADVAIR -21 mcg/actuation inhaler [ADVAIR -21 MCG/ACTUATION INHALER] Inhale 1 puff 2 (two) times a day.  11    albuterol (PROVENTIL HFA;VENTOLIN HFA) 90 mcg/actuation inhaler [ALBUTEROL (PROVENTIL HFA;VENTOLIN HFA) 90 MCG/ACTUATION INHALER] Inhale 2 puffs every 6 (six) hours as needed for wheezing.      albuterol (PROVENTIL) 2.5 mg /3 mL (0.083  %) nebulizer solution [ALBUTEROL (PROVENTIL) 2.5 MG /3 ML (0.083 %) NEBULIZER SOLUTION] Take 2.5 mg by nebulization every 6 (six) hours as needed for wheezing.      amLODIPine (NORVASC) 5 MG tablet [AMLODIPINE (NORVASC) 5 MG TABLET] Take 2.5 mg by mouth daily.       atorvastatin (LIPITOR) 20 MG tablet [ATORVASTATIN (LIPITOR) 20 MG TABLET] Take 20 mg by mouth bedtime.      CALCIUM CITRATE/VITAMIN D3 (CITRACAL + D PETITES ORAL) [CALCIUM CITRATE/VITAMIN D3 (CITRACAL + D PETITES ORAL)] Take 2 tablets by mouth 2 (two) times a day.       carvedilol (COREG) 3.125 MG tablet [CARVEDILOL (COREG) 3.125 MG TABLET] Take 3.125 mg by mouth bedtime.       cholecalciferol (VITAMIN D3) 125 mcg (5000 units) capsule Take 125 mcg by mouth daily      clotrimazole (LOTRIMIN) 1 % cream [CLOTRIMAZOLE (LOTRIMIN) 1 % CREAM] Apply 1 application topically 2 (two) times a day.  1    CONTOUR NEXT TEST STRIPS strips [CONTOUR NEXT TEST STRIPS STRIPS] daily. TEST ONCE DAILY      cyanocobalamin, vitamin B-12, 1,000 mcg Subl [CYANOCOBALAMIN, VITAMIN B-12, 1,000 MCG SUBL] Place 1 tablet (1,000 mcg total) under the tongue daily. 90 tablet prn    diltiazem (CARDIZEM) 60 MG tablet Take 1 tablet by mouth 4 times daily      fluocinonide (LIDEX) 0.05 % cream [FLUOCINONIDE (LIDEX) 0.05 % CREAM] Apply 1 application topically 2 (two) times a day.      gabapentin (NEURONTIN) 100 MG capsule Take 100 mg by mouth once Unsure of does      hydrocortisone 2.5 % cream [HYDROCORTISONE 2.5 % CREAM] Apply 1 application topically as needed.      losartan (COZAAR) 50 MG tablet [LOSARTAN (COZAAR) 50 MG TABLET] Take 50 mg by mouth daily. Two tabs daily      miconazole (MICOTIN) 2 % powder [MICONAZOLE (MICOTIN) 2 % POWDER] Apply 1 application topically as needed for itching (for rash under breasts).      montelukast (SINGULAIR) 10 mg tablet [MONTELUKAST (SINGULAIR) 10 MG TABLET] Take 10 mg by mouth bedtime.      omeprazole (PRILOSEC) 20 MG capsule Take 20 mg by mouth daily    "   pediatric multivit-iron-min (CEROVITE JR) Chew [PEDIATRIC MULTIVIT-IRON-MIN (CEROVITE JR) CHEW] Chew 1 tablet 2 (two) times a day. 180 each 3    phentermine (ADIPEX-P) 37.5 MG tablet TAKE HALF TO ONE TABLET BY MOUTH DAILY BEFORE BREAKFAST 90 tablet 1    QUEtiapine (SEROQUEL) 25 MG tablet [QUETIAPINE (SEROQUEL) 25 MG TABLET] Take 25 mg by mouth at bedtime.      SF 5000 PLUS 1.1 % Crea [SF 5000 PLUS 1.1 % CREA] Apply 1 application topically as needed.  0    SYMBICORT 80-4.5 MCG/ACT Inhaler INHALE 2 PUFFS BY MOUTH TWICE DAILY. RINSE MOUTH /. GARGLE AFTER USE      traZODone (DESYREL) 50 MG tablet [TRAZODONE (DESYREL) 50 MG TABLET] at bedtime as needed.      venlafaxine (EFFEXOR-XR) 150 MG 24 hr capsule [VENLAFAXINE (EFFEXOR-XR) 150 MG 24 HR CAPSULE] Take 1 capsule by mouth daily.  11    acetaminophen (TYLENOL) 500 MG tablet Take 500-1,000 mg by mouth as needed (Patient not taking: Reported on 9/11/2023)      levETIRAcetam (KEPPRA) 1000 MG tablet [LEVETIRACETAM (KEPPRA) 1000 MG TABLET] Take 1 tablet by mouth 2 (two) times a day. (Patient not taking: Reported on 9/11/2023)  3     No facility-administered encounter medications on file as of 9/11/2023.        Allergies   Allergen Reactions    Aspirin Other (See Comments)     Hx gastric bypass    Hydrocodone Other (See Comments)     Itching    Latex Unknown     Itchy    Nsaids Other (See Comments)     Hx of gastric bypass    Vicodin [Hydrocodone-Acetaminophen] Unknown     Itchy        Review of systems:  A full 10 point review of systems was obtained and was negative except for the pertinent positives and negatives stated within the HPI.    Objective Findings:   Physical Exam:    Constitutional: /75   Pulse 80   Ht 1.651 m (5' 5\")   Wt 98.6 kg (217 lb 6.4 oz)   SpO2 98%   BMI 36.18 kg/m    Physical Exam:    General: Alert, cooperative, no distress, well-appearing  Head: Atraumatic, normocephalic, no obvious abnormalities   Eyes: EOMI, Sclera anicteric, no " obvious conjunctival hemorrhage   Nose: Nares normal, no obvious malformation, no obvious rhinorrhea   Musculoskeletal: Range of motion intact, no obvious strength deficit  Skin: No jaundice, no obvious rash  Neurologic: AAOx3, no obvious neurologic abnormality  Psychiatric: Normal Affect, appropriate mood  Extremities: No obvious edema, no obvious malformation        Labs, Radiology, Pathology     Lab Results   Component Value Date    WBC 10.1 05/08/2023    WBC 9.4 04/21/2022    WBC 9.9 09/23/2020    HGB 12.5 05/08/2023    HGB 12.5 04/21/2022    HGB 11.7 (L) 09/23/2020     05/08/2023     04/21/2022     09/23/2020    CHOL 181 05/08/2023    CHOL 181 04/21/2022    CHOL 204 (H) 06/06/2019    TRIG 109 05/08/2023    TRIG 105 04/21/2022    TRIG 166 (H) 06/06/2019    HDL 85 05/08/2023    HDL 92 04/21/2022    HDL 85 06/06/2019    ALT 56 (H) 05/08/2023    ALT 63 (H) 04/21/2022    ALT 63 (H) 03/08/2021    AST 94 (H) 05/08/2023     (H) 04/21/2022    AST 58 (H) 03/08/2021     05/08/2023     04/21/2022     03/08/2021    BUN 10.9 05/08/2023    BUN 8 04/21/2022    BUN 8 03/08/2021    CO2 27 05/08/2023    CO2 27 04/21/2022    CO2 33 (H) 03/08/2021    TSH 16.79 (H) 04/21/2022        Liver Function Studies -   Recent Labs   Lab Test 03/08/21  1425   PROTTOTAL 7.6   ALBUMIN 4.4   BILITOTAL 0.7   ALKPHOS 174*   AST 58*   ALT 63*          Patient Active Problem List    Diagnosis Date Noted    Hepatic steatosis 06/06/2023     Priority: Medium    Elevated TSH 04/27/2022     Priority: Medium    Elevated liver enzymes 04/27/2022     Priority: Medium    Alkaline phosphatase elevation 04/27/2022     Priority: Medium    Morbid obesity (H) 04/21/2022     Priority: Medium    Congestive heart failure, unspecified HF chronicity, unspecified heart failure type (H) 03/29/2022     Priority: Medium    Esophageal dyskinesia 03/29/2022     Priority: Medium    Non-cardiac chest pain 03/29/2022     Priority:  Medium    Regurgitation of food 03/29/2022     Priority: Medium    Esophageal dysphagia 03/29/2022     Priority: Medium      Assessment and Plan   Assessment:    Yuliana Armendariz is a 63 year old female with asthma/COPD, GIORGIO with CPAP, HTN, HLD, CHF (No CVA or MI), seizure disorder (on keppra almost 10 years, bariatric surgery (onesimo-en-y 4/23/2014), dysphonia who is presenting as a follow up patient in consultation at the request of Dr. Nino ([facility]) with a chief complaint of dysphagia and hypercontractile esophagus.    The patient was seen in telephone consultation regarding her symptoms of dysphagia and hypercontractile esophagus.      Today the patient is interesting in monitoring her symptoms of dysphagia rather than pursuing additional endoscopy with repeat botox. If symptoms worsen or change I suggested repeat endoscopy with botox given the significant benefit she had the first time around (4/2022). In the future we may wish to pursue definitive therapy. We discussed POEM as a future option. Also discussed that it is uncertain if she would be a candidate given prior bariatric surgery. Also discussed the concept of POSED (post obesity related esophageal dysmotility).     Plan:    Please continue to monitor your symptoms with respect to your swallowing.   The next step for your difficulty swallowing would be a repeat endoscopy with botox injection if you choose to pursue that.    Follow up plan:   Return to clinic 6 months and as needed.    The risks and benefits of my recommendations, as well as other treatment options were discussed with the patient and any available family today. All questions were answered.     Follow up: As planned above. Today, I personally spent 40 minutes in direct face to face time with the patient, of which greater than 50% of the time was spent in patient education and counseling as described above. Approximately 12 minutes were spent on indirect care associated with the  patient's consultation including but not limited to review of: patient medical records to date, clinic visits, hospital records, lab results, imaging studies, procedural documentation, and coordinating care with other providers. The findings from this review are summarized in the above note. All of the above accounted for a cumulative time of 52 minutes and was performed on the date of service.     The patient verbalized understanding of the plan and was appreciative for the time spent and information provided during the office visit.     Author:   Parminder Lynch DO   of Medicine  Director, Esophageal Disorders Program  Division of Gastroenterology, Hepatology, and Nutrition  [facility]     Documentation assisted by voice recognition and documentation system.

## 2023-09-20 ENCOUNTER — TELEPHONE (OUTPATIENT)
Dept: GASTROENTEROLOGY | Facility: CLINIC | Age: 63
End: 2023-09-20
Payer: COMMERCIAL

## 2023-09-20 NOTE — TELEPHONE ENCOUNTER
LVM (no myc) -1st attempt     Schedule:  6 mo follow-up with Parminder Lynch (around March 2024). RTN ESO, in person or virtual.

## 2023-09-26 NOTE — TELEPHONE ENCOUNTER
Spoke with patient and scheduled the follow-up order. They are scheduled for an in person visit with Parminder Lynch on 3/18/24.

## 2023-10-18 ENCOUNTER — TELEPHONE (OUTPATIENT)
Dept: GASTROENTEROLOGY | Facility: CLINIC | Age: 63
End: 2023-10-18
Payer: COMMERCIAL

## 2023-12-06 ENCOUNTER — OFFICE VISIT (OUTPATIENT)
Dept: SURGERY | Facility: CLINIC | Age: 63
End: 2023-12-06
Payer: COMMERCIAL

## 2023-12-06 VITALS
HEIGHT: 64 IN | WEIGHT: 204 LBS | SYSTOLIC BLOOD PRESSURE: 138 MMHG | DIASTOLIC BLOOD PRESSURE: 88 MMHG | BODY MASS INDEX: 34.83 KG/M2

## 2023-12-06 DIAGNOSIS — R63.2 HYPERPHAGIA: ICD-10-CM

## 2023-12-06 DIAGNOSIS — R21 RASH: ICD-10-CM

## 2023-12-06 DIAGNOSIS — E66.811 OBESITY (BMI 30.0-34.9): ICD-10-CM

## 2023-12-06 DIAGNOSIS — K91.2 POSTOPERATIVE MALABSORPTION: Primary | ICD-10-CM

## 2023-12-06 PROCEDURE — 99214 OFFICE O/P EST MOD 30 MIN: CPT | Performed by: FAMILY MEDICINE

## 2023-12-06 RX ORDER — PEDI MULTIVIT 158/IRON/VIT K1 18MG-10MCG
TABLET,CHEWABLE ORAL
COMMUNITY
Start: 2023-10-13

## 2023-12-06 RX ORDER — CLOTRIMAZOLE 1 %
CREAM (GRAM) TOPICAL 2 TIMES DAILY
Qty: 60 G | Refills: 11 | Status: SHIPPED | OUTPATIENT
Start: 2023-12-06

## 2023-12-06 RX ORDER — PHENTERMINE HYDROCHLORIDE 37.5 MG/1
TABLET ORAL
Qty: 90 TABLET | Refills: 1 | Status: SHIPPED | OUTPATIENT
Start: 2023-12-06 | End: 2024-06-19

## 2023-12-06 RX ORDER — SODIUM FLUORIDE 5 MG/G
GEL, DENTIFRICE DENTAL
COMMUNITY
Start: 2023-11-29

## 2023-12-06 RX ORDER — LOSARTAN POTASSIUM 100 MG/1
TABLET ORAL
COMMUNITY
Start: 2023-10-16

## 2023-12-06 RX ORDER — GABAPENTIN 300 MG/1
CAPSULE ORAL
COMMUNITY
Start: 2023-11-25

## 2023-12-06 NOTE — PROGRESS NOTES
Bariatric Follow Up Visit with a History of Previous Bariatric Surgery     Date of visit: 12/6/2023  Physician: Dorothy Coronado MD, MD  Primary Care Provider:  Luan Mccray   63 year old  female    Date of Surgery: 4/23/2014  Initial Weight: 231#  Initial BMI: 38.9  Today's Weight:   Wt Readings from Last 1 Encounters:   12/06/23 92.5 kg (204 lb)     Body mass index is 35.02 kg/m .      Assessment and Plan     Assessment: Yuliana is a 63 year old year old female who is 9 1/2 yrs s/p  Joes Daniel en Y Gastric Bypass with Dr. Laura Armendariz feels as if she has achieved the goals she hoped to accomplish through bariatric surgery and weight loss.    Encounter Diagnosis   Name Primary?    Postoperative malabsorption Yes         Current Outpatient Medications:     acetaminophen (TYLENOL) 500 MG tablet, Take 500-1,000 mg by mouth as needed, Disp: , Rfl:     ADVAIR -21 mcg/actuation inhaler, [ADVAIR -21 MCG/ACTUATION INHALER] Inhale 1 puff 2 (two) times a day., Disp: , Rfl: 11    albuterol (PROVENTIL HFA;VENTOLIN HFA) 90 mcg/actuation inhaler, [ALBUTEROL (PROVENTIL HFA;VENTOLIN HFA) 90 MCG/ACTUATION INHALER] Inhale 2 puffs every 6 (six) hours as needed for wheezing., Disp: , Rfl:     albuterol (PROVENTIL) 2.5 mg /3 mL (0.083 %) nebulizer solution, [ALBUTEROL (PROVENTIL) 2.5 MG /3 ML (0.083 %) NEBULIZER SOLUTION] Take 2.5 mg by nebulization every 6 (six) hours as needed for wheezing., Disp: , Rfl:     amLODIPine (NORVASC) 5 MG tablet, [AMLODIPINE (NORVASC) 5 MG TABLET] Take 2.5 mg by mouth daily. , Disp: , Rfl:     atorvastatin (LIPITOR) 20 MG tablet, [ATORVASTATIN (LIPITOR) 20 MG TABLET] Take 20 mg by mouth bedtime., Disp: , Rfl:     CALCIUM CITRATE/VITAMIN D3 (CITRACAL + D PETITES ORAL), [CALCIUM CITRATE/VITAMIN D3 (CITRACAL + D PETITES ORAL)] Take 2 tablets by mouth 2 (two) times a day. , Disp: , Rfl:     carvedilol (COREG) 3.125 MG tablet, [CARVEDILOL (COREG) 3.125 MG  TABLET] Take 3.125 mg by mouth bedtime. , Disp: , Rfl:     cholecalciferol (VITAMIN D3) 125 mcg (5000 units) capsule, Take 125 mcg by mouth daily, Disp: , Rfl:     clotrimazole (LOTRIMIN) 1 % cream, [CLOTRIMAZOLE (LOTRIMIN) 1 % CREAM] Apply 1 application topically 2 (two) times a day., Disp: , Rfl: 1    CONTOUR NEXT TEST STRIPS strips, [CONTOUR NEXT TEST STRIPS STRIPS] daily. TEST ONCE DAILY, Disp: , Rfl:     cyanocobalamin, vitamin B-12, 1,000 mcg Subl, [CYANOCOBALAMIN, VITAMIN B-12, 1,000 MCG SUBL] Place 1 tablet (1,000 mcg total) under the tongue daily., Disp: 90 tablet, Rfl: prn    fluocinonide (LIDEX) 0.05 % cream, [FLUOCINONIDE (LIDEX) 0.05 % CREAM] Apply 1 application topically 2 (two) times a day., Disp: , Rfl:     gabapentin (NEURONTIN) 300 MG capsule, , Disp: , Rfl:     hydrocortisone 2.5 % cream, [HYDROCORTISONE 2.5 % CREAM] Apply 1 application topically as needed., Disp: , Rfl:     losartan (COZAAR) 100 MG tablet, , Disp: , Rfl:     miconazole (MICOTIN) 2 % powder, [MICONAZOLE (MICOTIN) 2 % POWDER] Apply 1 application topically as needed for itching (for rash under breasts)., Disp: , Rfl:     montelukast (SINGULAIR) 10 mg tablet, [MONTELUKAST (SINGULAIR) 10 MG TABLET] Take 10 mg by mouth bedtime., Disp: , Rfl:     omeprazole (PRILOSEC) 20 MG capsule, Take 20 mg by mouth daily, Disp: , Rfl:     pediatric multivit-iron-min (CEROVITE JR) Chew, [PEDIATRIC MULTIVIT-IRON-MIN (CEROVITE JR) CHEW] Chew 1 tablet 2 (two) times a day., Disp: 180 each, Rfl: 3    phentermine (ADIPEX-P) 37.5 MG tablet, TAKE HALF TO ONE TABLET BY MOUTH DAILY BEFORE BREAKFAST, Disp: 90 tablet, Rfl: 1    QUEtiapine (SEROQUEL) 25 MG tablet, [QUETIAPINE (SEROQUEL) 25 MG TABLET] Take 25 mg by mouth at bedtime., Disp: , Rfl:     SF 5000 PLUS 1.1 % Crea, [SF 5000 PLUS 1.1 % CREA] Apply 1 application topically as needed., Disp: , Rfl: 0    sodium fluoride dental gel (PREVIDENT) 1.1 % GEL topical gel, BRUSH TEETH WITH GEL TWICE DAILY. DO NOT EAT  "OR DRINK FOR 30 MINUTES AFTER, Disp: , Rfl:     SYMBICORT 80-4.5 MCG/ACT Inhaler, INHALE 2 PUFFS BY MOUTH TWICE DAILY. RINSE MOUTH /. GARGLE AFTER USE, Disp: , Rfl:     traZODone (DESYREL) 50 MG tablet, [TRAZODONE (DESYREL) 50 MG TABLET] at bedtime as needed., Disp: , Rfl:     venlafaxine (EFFEXOR-XR) 150 MG 24 hr capsule, [VENLAFAXINE (EFFEXOR-XR) 150 MG 24 HR CAPSULE] Take 1 capsule by mouth daily., Disp: , Rfl: 11    diltiazem (CARDIZEM) 60 MG tablet, Take 1 tablet by mouth 4 times daily (Patient not taking: Reported on 12/6/2023), Disp: , Rfl:     gabapentin (NEURONTIN) 100 MG capsule, Take 100 mg by mouth once Unsure of does (Patient not taking: Reported on 12/6/2023), Disp: , Rfl:     levETIRAcetam (KEPPRA) 1000 MG tablet, [LEVETIRACETAM (KEPPRA) 1000 MG TABLET] Take 1 tablet by mouth 2 (two) times a day. (Patient not taking: Reported on 9/11/2023), Disp: , Rfl: 3    losartan (COZAAR) 50 MG tablet, [LOSARTAN (COZAAR) 50 MG TABLET] Take 50 mg by mouth daily. Two tabs daily (Patient not taking: Reported on 12/6/2023), Disp: , Rfl:     Pediatric Multivitamins-Iron (CEROVITE JR) 18 MG chewable tablet, CHEW AND SWALLOW 2 TABLETS BY MOUTH TWICE DAILY (Patient not taking: Reported on 12/6/2023), Disp: , Rfl:      Plan:    Return in about 6 months (around 6/6/2024).    Bariatric Surgery Review     Interim History/LifeChanges: Maintaining a 27# weight loss. Has had EGD at the  and BOTOX injections. Had recent colonoscopy with polypectomy. Adenomatous. Seeing the neurosurgeon for spinal stenosis. Back injections didn't work. Taking her vitamins with consistency. She is taking phentermine. Walking her dog.       Patient Concerns: discussed multiple issues  Appetite (1-10): OK  GERD: On PPI    Medication changes: trazodone for sleep    Vitamin Intake:   B-12   SL   MVI  2/d   Vitamin D  5,000U   Calcium        Other                LABS: \"Reviewed  From   Nausea no  Vomiting no  Constipation no  Diarrhea no  Rashes yes " chronic recurrent  Hair Loss no  Calf tenderness no  Breathing difficulty no  Reactive Hypoglycemia avoids  Light Headedness no   Moods euthymic    12 point ROS as above and otherwise negative      Habits:  Alcohol: 5  Tobacco: no  Caffeine decaf  NSAIDS no  Exercise Routine: bands. Stetching, squats, walking as much as possible  3 meals/day yes  Protein first yes  60grams/day  Water Separate from meals yes  Calorie Containing Beverages daqueries  Restaurant eating/wk none  Sleeping with trazodone  Stress low- [facility]  CPAP: yes  Contraception: PM  DEXA:    Social History     Social History     Socioeconomic History    Marital status: Single     Spouse name: Not on file    Number of children: Not on file    Years of education: Not on file    Highest education level: Not on file   Occupational History    Not on file   Tobacco Use    Smoking status: Former    Smokeless tobacco: Never    Tobacco comments:     quit over 25 years ago   Substance and Sexual Activity    Alcohol use: Yes     Comment: Alcoholic Drinks/day: Rarely    Drug use: No    Sexual activity: Not Currently     Partners: Male     Birth control/protection: Surgical   Other Topics Concern    Not on file   Social History Narrative    Single. Lives with her dog     Social Determinants of Health     Financial Resource Strain: Not on file   Food Insecurity: Not on file   Transportation Needs: Not on file   Physical Activity: Not on file   Stress: Not on file   Social Connections: Not on file   Interpersonal Safety: Not At Risk (2/17/2022)    Humiliation, Afraid, Rape, and Kick questionnaire     Fear of Current or Ex-Partner: No     Emotionally Abused: No     Physically Abused: No     Sexually Abused: No   Housing Stability: Not on file       Past Medical History     Past Medical History:   Diagnosis Date    Asthma     Back pain     Congestive heart failure (H)     COPD (chronic obstructive pulmonary disease) (H)     Degenerative disc disease     Diabetes  type 2, controlled (H)     Dyslipidemia     Esophageal dyskinesia     GERD (gastroesophageal reflux disease)     Gout     Hepatic steatosis     Hyperparathyroidism (H24)     Hyperparathyroidism, secondary (H24)     Hypertension     Low bone mass     Metabolic syndrome     Morbid obesity (H)     Osteoarthritis     Reflux      Problem List     Patient Active Problem List   Diagnosis    Congestive heart failure, unspecified HF chronicity, unspecified heart failure type (H)    Esophageal dyskinesia    Non-cardiac chest pain    Regurgitation of food    Esophageal dysphagia    Morbid obesity (H)    Elevated TSH    Elevated liver enzymes    Alkaline phosphatase elevation    Hepatic steatosis     Medications       Current Outpatient Medications:     acetaminophen (TYLENOL) 500 MG tablet, Take 500-1,000 mg by mouth as needed, Disp: , Rfl:     ADVAIR -21 mcg/actuation inhaler, [ADVAIR -21 MCG/ACTUATION INHALER] Inhale 1 puff 2 (two) times a day., Disp: , Rfl: 11    albuterol (PROVENTIL HFA;VENTOLIN HFA) 90 mcg/actuation inhaler, [ALBUTEROL (PROVENTIL HFA;VENTOLIN HFA) 90 MCG/ACTUATION INHALER] Inhale 2 puffs every 6 (six) hours as needed for wheezing., Disp: , Rfl:     albuterol (PROVENTIL) 2.5 mg /3 mL (0.083 %) nebulizer solution, [ALBUTEROL (PROVENTIL) 2.5 MG /3 ML (0.083 %) NEBULIZER SOLUTION] Take 2.5 mg by nebulization every 6 (six) hours as needed for wheezing., Disp: , Rfl:     amLODIPine (NORVASC) 5 MG tablet, [AMLODIPINE (NORVASC) 5 MG TABLET] Take 2.5 mg by mouth daily. , Disp: , Rfl:     atorvastatin (LIPITOR) 20 MG tablet, [ATORVASTATIN (LIPITOR) 20 MG TABLET] Take 20 mg by mouth bedtime., Disp: , Rfl:     CALCIUM CITRATE/VITAMIN D3 (CITRACAL + D PETITES ORAL), [CALCIUM CITRATE/VITAMIN D3 (CITRACAL + D PETITES ORAL)] Take 2 tablets by mouth 2 (two) times a day. , Disp: , Rfl:     carvedilol (COREG) 3.125 MG tablet, [CARVEDILOL (COREG) 3.125 MG TABLET] Take 3.125 mg by mouth bedtime. , Disp: , Rfl:      cholecalciferol (VITAMIN D3) 125 mcg (5000 units) capsule, Take 125 mcg by mouth daily, Disp: , Rfl:     clotrimazole (LOTRIMIN) 1 % cream, [CLOTRIMAZOLE (LOTRIMIN) 1 % CREAM] Apply 1 application topically 2 (two) times a day., Disp: , Rfl: 1    CONTOUR NEXT TEST STRIPS strips, [CONTOUR NEXT TEST STRIPS STRIPS] daily. TEST ONCE DAILY, Disp: , Rfl:     cyanocobalamin, vitamin B-12, 1,000 mcg Subl, [CYANOCOBALAMIN, VITAMIN B-12, 1,000 MCG SUBL] Place 1 tablet (1,000 mcg total) under the tongue daily., Disp: 90 tablet, Rfl: prn    fluocinonide (LIDEX) 0.05 % cream, [FLUOCINONIDE (LIDEX) 0.05 % CREAM] Apply 1 application topically 2 (two) times a day., Disp: , Rfl:     gabapentin (NEURONTIN) 300 MG capsule, , Disp: , Rfl:     hydrocortisone 2.5 % cream, [HYDROCORTISONE 2.5 % CREAM] Apply 1 application topically as needed., Disp: , Rfl:     losartan (COZAAR) 100 MG tablet, , Disp: , Rfl:     miconazole (MICOTIN) 2 % powder, [MICONAZOLE (MICOTIN) 2 % POWDER] Apply 1 application topically as needed for itching (for rash under breasts)., Disp: , Rfl:     montelukast (SINGULAIR) 10 mg tablet, [MONTELUKAST (SINGULAIR) 10 MG TABLET] Take 10 mg by mouth bedtime., Disp: , Rfl:     omeprazole (PRILOSEC) 20 MG capsule, Take 20 mg by mouth daily, Disp: , Rfl:     pediatric multivit-iron-min (CEROVITE JR) Chew, [PEDIATRIC MULTIVIT-IRON-MIN (CEROVITE JR) CHEW] Chew 1 tablet 2 (two) times a day., Disp: 180 each, Rfl: 3    phentermine (ADIPEX-P) 37.5 MG tablet, TAKE HALF TO ONE TABLET BY MOUTH DAILY BEFORE BREAKFAST, Disp: 90 tablet, Rfl: 1    QUEtiapine (SEROQUEL) 25 MG tablet, [QUETIAPINE (SEROQUEL) 25 MG TABLET] Take 25 mg by mouth at bedtime., Disp: , Rfl:     SF 5000 PLUS 1.1 % Crea, [SF 5000 PLUS 1.1 % CREA] Apply 1 application topically as needed., Disp: , Rfl: 0    sodium fluoride dental gel (PREVIDENT) 1.1 % GEL topical gel, BRUSH TEETH WITH GEL TWICE DAILY. DO NOT EAT OR DRINK FOR 30 MINUTES AFTER, Disp: , Rfl:      "SYMBICORT 80-4.5 MCG/ACT Inhaler, INHALE 2 PUFFS BY MOUTH TWICE DAILY. RINSE MOUTH /. GARGLE AFTER USE, Disp: , Rfl:     traZODone (DESYREL) 50 MG tablet, [TRAZODONE (DESYREL) 50 MG TABLET] at bedtime as needed., Disp: , Rfl:     venlafaxine (EFFEXOR-XR) 150 MG 24 hr capsule, [VENLAFAXINE (EFFEXOR-XR) 150 MG 24 HR CAPSULE] Take 1 capsule by mouth daily., Disp: , Rfl: 11    diltiazem (CARDIZEM) 60 MG tablet, Take 1 tablet by mouth 4 times daily (Patient not taking: Reported on 12/6/2023), Disp: , Rfl:     gabapentin (NEURONTIN) 100 MG capsule, Take 100 mg by mouth once Unsure of does (Patient not taking: Reported on 12/6/2023), Disp: , Rfl:     levETIRAcetam (KEPPRA) 1000 MG tablet, [LEVETIRACETAM (KEPPRA) 1000 MG TABLET] Take 1 tablet by mouth 2 (two) times a day. (Patient not taking: Reported on 9/11/2023), Disp: , Rfl: 3    losartan (COZAAR) 50 MG tablet, [LOSARTAN (COZAAR) 50 MG TABLET] Take 50 mg by mouth daily. Two tabs daily (Patient not taking: Reported on 12/6/2023), Disp: , Rfl:     Pediatric Multivitamins-Iron (CEROVITE JR) 18 MG chewable tablet, CHEW AND SWALLOW 2 TABLETS BY MOUTH TWICE DAILY (Patient not taking: Reported on 12/6/2023), Disp: , Rfl:    Surgical History     Past Surgical History  She has a past surgical history that includes Revision Jose Daniel-En-Y (4/23/2014); Cholecystectomy; Hysterectomy; tubal ligation; Total Knee Arthroplasty (Left, 03/2019); Arthroscopy knee with meniscal repair (Left, 01/2019); Esophagoscopy, gastroscopy, duodenoscopy (EGD), combined (N/A, 4/26/2022); Esophageal Balloon Provocation Study (N/A, 4/26/2022); and Esophagogastroduodenoscopy, With Botulinum Toxin Injection (4/26/2022).    Objective-Exam     Constitutional:  /88   Ht 1.626 m (5' 4\")   Wt 92.5 kg (204 lb)   BMI 35.02 kg/m      General:  Pleasant and in no acute distress   Eyes:  EOMI  ENT:  Airway 2+  Moist mucous membranes  Neck:  Supple, No LAD, No thyromegaly, No carotid bruits " appreciated  Respiratory: Normal respiratory effort, no cough, wheezes or crackles  CV:  Regular rate and Rhythm,1/6 murmurs, pulses 2+, no calf tenderness, no LE edema  Gastrointestinal: Abdomen NT/ND, BS+  Musculoskeletal: muscle mass WNL  Skin: color good hair full, incisions nicely healed  Neurological: No tremor, normal gait  Psychiatric: alert and oriented X3, mood and affect normal    Counseling     We reviewed the important post op bariatric recommendations:  -eating 3 meals daily  -eating protein first, getting >60gm protein daily  -eating slowly, chewing food well  -avoiding/limiting calorie containing beverages  -drinking water 15-30 minutes before or after meals  -choosing wheat, not white with breads, crackers, pastas, micheal, bagels, tortillas, rice  -limiting restaurant or cafeteria eating to twice a week or less    We discussed the importance of restorative sleep and stress management in maintaining a healthy weight.  We discussed the National Weight Control Registry healthy weight maintenance strategies and ways to optimize metabolism.  We discussed the importance of physical activity including cardiovascular and strength training in maintaining a healthier weight.    We discussed the importance of life-long vitamin supplementation and life-long  follow-up.    Yuliana was reminded that, to avoid marginal ulcers she should avoid tobacco at all, alcohol in excess, caffeine in excess, and NSAIDS (unless indicated for cardioprotection or othewise and opposed by a PPI).    Dorothy Coronado MD, MD, [facility].  12/6/2023  11:21 AM      No images are attached to the encounter.  Total time spent on the date of this encounter doing: chart review, review of test results, patient visit, physical exam, education, counseling, developing plan of care, and documenting = 30 minutes.

## 2023-12-06 NOTE — LETTER
12/6/2023         RE: Yuliana Armendariz  [address]  [facility] [address]        Dear Colleague,    Thank you for referring your patient, Yuliana Armendariz, to the [facility]. Please see a copy of my visit note below.    Bariatric Follow Up Visit with a History of Previous Bariatric Surgery     Date of visit: 12/6/2023  Physician: Dorothy Coronado MD, MD  Primary Care Provider:  Luan Mccray  Yuliana Armendariz   63 year old  female    Date of Surgery: 4/23/2014  Initial Weight: 231#  Initial BMI: 38.9  Today's Weight:   Wt Readings from Last 1 Encounters:   12/06/23 92.5 kg (204 lb)     Body mass index is 35.02 kg/m .      Assessment and Plan     Assessment: Yuliana is a 63 year old year old female who is 9 1/2 yrs s/p  Jose Daniel en Y Gastric Bypass with Dr. Sanchez  Yuliana Armendariz feels as if she has achieved the goals she hoped to accomplish through bariatric surgery and weight loss.    Encounter Diagnosis   Name Primary?     Postoperative malabsorption Yes         Current Outpatient Medications:      acetaminophen (TYLENOL) 500 MG tablet, Take 500-1,000 mg by mouth as needed, Disp: , Rfl:      ADVAIR -21 mcg/actuation inhaler, [ADVAIR -21 MCG/ACTUATION INHALER] Inhale 1 puff 2 (two) times a day., Disp: , Rfl: 11     albuterol (PROVENTIL HFA;VENTOLIN HFA) 90 mcg/actuation inhaler, [ALBUTEROL (PROVENTIL HFA;VENTOLIN HFA) 90 MCG/ACTUATION INHALER] Inhale 2 puffs every 6 (six) hours as needed for wheezing., Disp: , Rfl:      albuterol (PROVENTIL) 2.5 mg /3 mL (0.083 %) nebulizer solution, [ALBUTEROL (PROVENTIL) 2.5 MG /3 ML (0.083 %) NEBULIZER SOLUTION] Take 2.5 mg by nebulization every 6 (six) hours as needed for wheezing., Disp: , Rfl:      amLODIPine (NORVASC) 5 MG tablet, [AMLODIPINE (NORVASC) 5 MG TABLET] Take 2.5 mg by mouth daily. , Disp: , Rfl:      atorvastatin (LIPITOR) 20 MG tablet, [ATORVASTATIN (LIPITOR) 20 MG  TABLET] Take 20 mg by mouth bedtime., Disp: , Rfl:      CALCIUM CITRATE/VITAMIN D3 (CITRACAL + D PETITES ORAL), [CALCIUM CITRATE/VITAMIN D3 (CITRACAL + D PETITES ORAL)] Take 2 tablets by mouth 2 (two) times a day. , Disp: , Rfl:      carvedilol (COREG) 3.125 MG tablet, [CARVEDILOL (COREG) 3.125 MG TABLET] Take 3.125 mg by mouth bedtime. , Disp: , Rfl:      cholecalciferol (VITAMIN D3) 125 mcg (5000 units) capsule, Take 125 mcg by mouth daily, Disp: , Rfl:      clotrimazole (LOTRIMIN) 1 % cream, [CLOTRIMAZOLE (LOTRIMIN) 1 % CREAM] Apply 1 application topically 2 (two) times a day., Disp: , Rfl: 1     CONTOUR NEXT TEST STRIPS strips, [CONTOUR NEXT TEST STRIPS STRIPS] daily. TEST ONCE DAILY, Disp: , Rfl:      cyanocobalamin, vitamin B-12, 1,000 mcg Subl, [CYANOCOBALAMIN, VITAMIN B-12, 1,000 MCG SUBL] Place 1 tablet (1,000 mcg total) under the tongue daily., Disp: 90 tablet, Rfl: prn     fluocinonide (LIDEX) 0.05 % cream, [FLUOCINONIDE (LIDEX) 0.05 % CREAM] Apply 1 application topically 2 (two) times a day., Disp: , Rfl:      gabapentin (NEURONTIN) 300 MG capsule, , Disp: , Rfl:      hydrocortisone 2.5 % cream, [HYDROCORTISONE 2.5 % CREAM] Apply 1 application topically as needed., Disp: , Rfl:      losartan (COZAAR) 100 MG tablet, , Disp: , Rfl:      miconazole (MICOTIN) 2 % powder, [MICONAZOLE (MICOTIN) 2 % POWDER] Apply 1 application topically as needed for itching (for rash under breasts)., Disp: , Rfl:      montelukast (SINGULAIR) 10 mg tablet, [MONTELUKAST (SINGULAIR) 10 MG TABLET] Take 10 mg by mouth bedtime., Disp: , Rfl:      omeprazole (PRILOSEC) 20 MG capsule, Take 20 mg by mouth daily, Disp: , Rfl:      pediatric multivit-iron-min (CEROVITE JR) Chew, [PEDIATRIC MULTIVIT-IRON-MIN (CEROVITE JR) CHEW] Chew 1 tablet 2 (two) times a day., Disp: 180 each, Rfl: 3     phentermine (ADIPEX-P) 37.5 MG tablet, TAKE HALF TO ONE TABLET BY MOUTH DAILY BEFORE BREAKFAST, Disp: 90 tablet, Rfl: 1     QUEtiapine (SEROQUEL) 25 MG  tablet, [QUETIAPINE (SEROQUEL) 25 MG TABLET] Take 25 mg by mouth at bedtime., Disp: , Rfl:      SF 5000 PLUS 1.1 % Crea, [SF 5000 PLUS 1.1 % CREA] Apply 1 application topically as needed., Disp: , Rfl: 0     sodium fluoride dental gel (PREVIDENT) 1.1 % GEL topical gel, BRUSH TEETH WITH GEL TWICE DAILY. DO NOT EAT OR DRINK FOR 30 MINUTES AFTER, Disp: , Rfl:      SYMBICORT 80-4.5 MCG/ACT Inhaler, INHALE 2 PUFFS BY MOUTH TWICE DAILY. RINSE MOUTH /. GARGLE AFTER USE, Disp: , Rfl:      traZODone (DESYREL) 50 MG tablet, [TRAZODONE (DESYREL) 50 MG TABLET] at bedtime as needed., Disp: , Rfl:      venlafaxine (EFFEXOR-XR) 150 MG 24 hr capsule, [VENLAFAXINE (EFFEXOR-XR) 150 MG 24 HR CAPSULE] Take 1 capsule by mouth daily., Disp: , Rfl: 11     diltiazem (CARDIZEM) 60 MG tablet, Take 1 tablet by mouth 4 times daily (Patient not taking: Reported on 12/6/2023), Disp: , Rfl:      gabapentin (NEURONTIN) 100 MG capsule, Take 100 mg by mouth once Unsure of does (Patient not taking: Reported on 12/6/2023), Disp: , Rfl:      levETIRAcetam (KEPPRA) 1000 MG tablet, [LEVETIRACETAM (KEPPRA) 1000 MG TABLET] Take 1 tablet by mouth 2 (two) times a day. (Patient not taking: Reported on 9/11/2023), Disp: , Rfl: 3     losartan (COZAAR) 50 MG tablet, [LOSARTAN (COZAAR) 50 MG TABLET] Take 50 mg by mouth daily. Two tabs daily (Patient not taking: Reported on 12/6/2023), Disp: , Rfl:      Pediatric Multivitamins-Iron (CEROVITE JR) 18 MG chewable tablet, CHEW AND SWALLOW 2 TABLETS BY MOUTH TWICE DAILY (Patient not taking: Reported on 12/6/2023), Disp: , Rfl:      Plan:    Return in about 6 months (around 6/6/2024).    Bariatric Surgery Review     Interim History/LifeChanges: Maintaining a 27# weight loss. Has had EGD at the  and BOTOX injections. Had recent colonoscopy with polypectomy. Adenomatous. Seeing the neurosurgeon for spinal stenosis. Back injections didn't work. Taking her vitamins with consistency. She is taking phentermine. Walking her  "dog.       Patient Concerns: discussed multiple issues  Appetite (1-10): OK  GERD: On PPI    Medication changes: trazodone for sleep    Vitamin Intake:   B-12   SL   MVI  2/d   Vitamin D  5,000U   Calcium        Other                LABS: \"Reviewed  From   Nausea no  Vomiting no  Constipation no  Diarrhea no  Rashes yes chronic recurrent  Hair Loss no  Calf tenderness no  Breathing difficulty no  Reactive Hypoglycemia avoids  Light Headedness no   Moods euthymic    12 point ROS as above and otherwise negative      Habits:  Alcohol: 5  Tobacco: no  Caffeine decaf  NSAIDS no  Exercise Routine: bands. Stetching, squats, walking as much as possible  3 meals/day yes  Protein first yes  60grams/day  Water Separate from meals yes  Calorie Containing Beverages daqueries  Restaurant eating/wk none  Sleeping with trazodone  Stress low- [facility]  CPAP: yes  Contraception: PM  DEXA:    Social History     Social History     Socioeconomic History     Marital status: Single     Spouse name: Not on file     Number of children: Not on file     Years of education: Not on file     Highest education level: Not on file   Occupational History     Not on file   Tobacco Use     Smoking status: Former     Smokeless tobacco: Never     Tobacco comments:     quit over 25 years ago   Substance and Sexual Activity     Alcohol use: Yes     Comment: Alcoholic Drinks/day: Rarely     Drug use: No     Sexual activity: Not Currently     Partners: Male     Birth control/protection: Surgical   Other Topics Concern     Not on file   Social History Narrative    Single. Lives with her dog     Social Determinants of Health     Financial Resource Strain: Not on file   Food Insecurity: Not on file   Transportation Needs: Not on file   Physical Activity: Not on file   Stress: Not on file   Social Connections: Not on file   Interpersonal Safety: Not At Risk (2/17/2022)    Humiliation, Afraid, Rape, and Kick questionnaire      Fear of Current or Ex-Partner: No "      Emotionally Abused: No      Physically Abused: No      Sexually Abused: No   Housing Stability: Not on file       Past Medical History     Past Medical History:   Diagnosis Date     Asthma      Back pain      Congestive heart failure (H)      COPD (chronic obstructive pulmonary disease) (H)      Degenerative disc disease      Diabetes type 2, controlled (H)      Dyslipidemia      Esophageal dyskinesia      GERD (gastroesophageal reflux disease)      Gout      Hepatic steatosis      Hyperparathyroidism (H24)      Hyperparathyroidism, secondary (H24)      Hypertension      Low bone mass      Metabolic syndrome      Morbid obesity (H)      Osteoarthritis      Reflux      Problem List     Patient Active Problem List   Diagnosis     Congestive heart failure, unspecified HF chronicity, unspecified heart failure type (H)     Esophageal dyskinesia     Non-cardiac chest pain     Regurgitation of food     Esophageal dysphagia     Morbid obesity (H)     Elevated TSH     Elevated liver enzymes     Alkaline phosphatase elevation     Hepatic steatosis     Medications       Current Outpatient Medications:      acetaminophen (TYLENOL) 500 MG tablet, Take 500-1,000 mg by mouth as needed, Disp: , Rfl:      ADVAIR -21 mcg/actuation inhaler, [ADVAIR -21 MCG/ACTUATION INHALER] Inhale 1 puff 2 (two) times a day., Disp: , Rfl: 11     albuterol (PROVENTIL HFA;VENTOLIN HFA) 90 mcg/actuation inhaler, [ALBUTEROL (PROVENTIL HFA;VENTOLIN HFA) 90 MCG/ACTUATION INHALER] Inhale 2 puffs every 6 (six) hours as needed for wheezing., Disp: , Rfl:      albuterol (PROVENTIL) 2.5 mg /3 mL (0.083 %) nebulizer solution, [ALBUTEROL (PROVENTIL) 2.5 MG /3 ML (0.083 %) NEBULIZER SOLUTION] Take 2.5 mg by nebulization every 6 (six) hours as needed for wheezing., Disp: , Rfl:      amLODIPine (NORVASC) 5 MG tablet, [AMLODIPINE (NORVASC) 5 MG TABLET] Take 2.5 mg by mouth daily. , Disp: , Rfl:      atorvastatin (LIPITOR) 20 MG tablet,  [ATORVASTATIN (LIPITOR) 20 MG TABLET] Take 20 mg by mouth bedtime., Disp: , Rfl:      CALCIUM CITRATE/VITAMIN D3 (CITRACAL + D PETITES ORAL), [CALCIUM CITRATE/VITAMIN D3 (CITRACAL + D PETITES ORAL)] Take 2 tablets by mouth 2 (two) times a day. , Disp: , Rfl:      carvedilol (COREG) 3.125 MG tablet, [CARVEDILOL (COREG) 3.125 MG TABLET] Take 3.125 mg by mouth bedtime. , Disp: , Rfl:      cholecalciferol (VITAMIN D3) 125 mcg (5000 units) capsule, Take 125 mcg by mouth daily, Disp: , Rfl:      clotrimazole (LOTRIMIN) 1 % cream, [CLOTRIMAZOLE (LOTRIMIN) 1 % CREAM] Apply 1 application topically 2 (two) times a day., Disp: , Rfl: 1     CONTOUR NEXT TEST STRIPS strips, [CONTOUR NEXT TEST STRIPS STRIPS] daily. TEST ONCE DAILY, Disp: , Rfl:      cyanocobalamin, vitamin B-12, 1,000 mcg Subl, [CYANOCOBALAMIN, VITAMIN B-12, 1,000 MCG SUBL] Place 1 tablet (1,000 mcg total) under the tongue daily., Disp: 90 tablet, Rfl: prn     fluocinonide (LIDEX) 0.05 % cream, [FLUOCINONIDE (LIDEX) 0.05 % CREAM] Apply 1 application topically 2 (two) times a day., Disp: , Rfl:      gabapentin (NEURONTIN) 300 MG capsule, , Disp: , Rfl:      hydrocortisone 2.5 % cream, [HYDROCORTISONE 2.5 % CREAM] Apply 1 application topically as needed., Disp: , Rfl:      losartan (COZAAR) 100 MG tablet, , Disp: , Rfl:      miconazole (MICOTIN) 2 % powder, [MICONAZOLE (MICOTIN) 2 % POWDER] Apply 1 application topically as needed for itching (for rash under breasts)., Disp: , Rfl:      montelukast (SINGULAIR) 10 mg tablet, [MONTELUKAST (SINGULAIR) 10 MG TABLET] Take 10 mg by mouth bedtime., Disp: , Rfl:      omeprazole (PRILOSEC) 20 MG capsule, Take 20 mg by mouth daily, Disp: , Rfl:      pediatric multivit-iron-min (CEROVITE JR) Chew, [PEDIATRIC MULTIVIT-IRON-MIN (CEROVITE JR) CHEW] Chew 1 tablet 2 (two) times a day., Disp: 180 each, Rfl: 3     phentermine (ADIPEX-P) 37.5 MG tablet, TAKE HALF TO ONE TABLET BY MOUTH DAILY BEFORE BREAKFAST, Disp: 90 tablet, Rfl:  1     QUEtiapine (SEROQUEL) 25 MG tablet, [QUETIAPINE (SEROQUEL) 25 MG TABLET] Take 25 mg by mouth at bedtime., Disp: , Rfl:      SF 5000 PLUS 1.1 % Crea, [SF 5000 PLUS 1.1 % CREA] Apply 1 application topically as needed., Disp: , Rfl: 0     sodium fluoride dental gel (PREVIDENT) 1.1 % GEL topical gel, BRUSH TEETH WITH GEL TWICE DAILY. DO NOT EAT OR DRINK FOR 30 MINUTES AFTER, Disp: , Rfl:      SYMBICORT 80-4.5 MCG/ACT Inhaler, INHALE 2 PUFFS BY MOUTH TWICE DAILY. RINSE MOUTH /. GARGLE AFTER USE, Disp: , Rfl:      traZODone (DESYREL) 50 MG tablet, [TRAZODONE (DESYREL) 50 MG TABLET] at bedtime as needed., Disp: , Rfl:      venlafaxine (EFFEXOR-XR) 150 MG 24 hr capsule, [VENLAFAXINE (EFFEXOR-XR) 150 MG 24 HR CAPSULE] Take 1 capsule by mouth daily., Disp: , Rfl: 11     diltiazem (CARDIZEM) 60 MG tablet, Take 1 tablet by mouth 4 times daily (Patient not taking: Reported on 12/6/2023), Disp: , Rfl:      gabapentin (NEURONTIN) 100 MG capsule, Take 100 mg by mouth once Unsure of does (Patient not taking: Reported on 12/6/2023), Disp: , Rfl:      levETIRAcetam (KEPPRA) 1000 MG tablet, [LEVETIRACETAM (KEPPRA) 1000 MG TABLET] Take 1 tablet by mouth 2 (two) times a day. (Patient not taking: Reported on 9/11/2023), Disp: , Rfl: 3     losartan (COZAAR) 50 MG tablet, [LOSARTAN (COZAAR) 50 MG TABLET] Take 50 mg by mouth daily. Two tabs daily (Patient not taking: Reported on 12/6/2023), Disp: , Rfl:      Pediatric Multivitamins-Iron (CEROVITE JR) 18 MG chewable tablet, CHEW AND SWALLOW 2 TABLETS BY MOUTH TWICE DAILY (Patient not taking: Reported on 12/6/2023), Disp: , Rfl:    Surgical History     Past Surgical History  She has a past surgical history that includes Revision Jose Daniel-En-Y (4/23/2014); Cholecystectomy; Hysterectomy; tubal ligation; Total Knee Arthroplasty (Left, 03/2019); Arthroscopy knee with meniscal repair (Left, 01/2019); Esophagoscopy, gastroscopy, duodenoscopy (EGD), combined (N/A, 4/26/2022); Esophageal Balloon  "Provocation Study (N/A, 4/26/2022); and Esophagogastroduodenoscopy, With Botulinum Toxin Injection (4/26/2022).    Objective-Exam     Constitutional:  /88   Ht 1.626 m (5' 4\")   Wt 92.5 kg (204 lb)   BMI 35.02 kg/m      General:  Pleasant and in no acute distress   Eyes:  EOMI  ENT:  Airway 2+  Moist mucous membranes  Neck:  Supple, No LAD, No thyromegaly, No carotid bruits appreciated  Respiratory: Normal respiratory effort, no cough, wheezes or crackles  CV:  Regular rate and Rhythm,1/6 murmurs, pulses 2+, no calf tenderness, no LE edema  Gastrointestinal: Abdomen NT/ND, BS+  Musculoskeletal: muscle mass WNL  Skin: color good hair full, incisions nicely healed  Neurological: No tremor, normal gait  Psychiatric: alert and oriented X3, mood and affect normal    Counseling     We reviewed the important post op bariatric recommendations:  -eating 3 meals daily  -eating protein first, getting >60gm protein daily  -eating slowly, chewing food well  -avoiding/limiting calorie containing beverages  -drinking water 15-30 minutes before or after meals  -choosing wheat, not white with breads, crackers, pastas, micheal, bagels, tortillas, rice  -limiting restaurant or cafeteria eating to twice a week or less    We discussed the importance of restorative sleep and stress management in maintaining a healthy weight.  We discussed the National Weight Control Registry healthy weight maintenance strategies and ways to optimize metabolism.  We discussed the importance of physical activity including cardiovascular and strength training in maintaining a healthier weight.    We discussed the importance of life-long vitamin supplementation and life-long  follow-up.    Yuliana was reminded that, to avoid marginal ulcers she should avoid tobacco at all, alcohol in excess, caffeine in excess, and NSAIDS (unless indicated for cardioprotection or othewise and opposed by a PPI).    Dorothy Coronado MD, MD, FAAFP  [facility]  12/6/2023  11:21 AM      No images are attached to the encounter.  Total time spent on the date of this encounter doing: chart review, review of test results, patient visit, physical exam, education, counseling, developing plan of care, and documenting = 30 minutes.      Again, thank you for allowing me to participate in the care of your patient.        Sincerely,        Dorothy Coronado MD

## 2024-01-03 ENCOUNTER — TELEPHONE (OUTPATIENT)
Dept: GASTROENTEROLOGY | Facility: CLINIC | Age: 64
End: 2024-01-03
Payer: COMMERCIAL

## 2024-01-03 NOTE — TELEPHONE ENCOUNTER
Left Voicemail (1st Attempt) for the patient to call back and schedule the following:    Appointment type: Follow up   Provider: Dr. Lynch  Return date: next available  Specialty phone number: [phone]  Additional appointment(s) needed:   Additonal Notes:

## 2024-03-20 ENCOUNTER — TELEPHONE (OUTPATIENT)
Dept: GASTROENTEROLOGY | Facility: CLINIC | Age: 64
End: 2024-03-20
Payer: COMMERCIAL

## 2024-03-20 NOTE — TELEPHONE ENCOUNTER
Left Voicemail (1st Attempt) for the patient to call back and schedule the following:    Appointment type: return GI   Provider: Syed  Return date: next available  Specialty phone number: [phone]  Additional appointment(s) needed:   Additonal Notes:

## 2024-05-07 ENCOUNTER — TELEPHONE (OUTPATIENT)
Dept: GASTROENTEROLOGY | Facility: CLINIC | Age: 64
End: 2024-05-07
Payer: COMMERCIAL

## 2024-06-13 ENCOUNTER — TRANSCRIBE ORDERS (OUTPATIENT)
Dept: SURGERY | Facility: CLINIC | Age: 64
End: 2024-06-13
Payer: COMMERCIAL

## 2024-06-13 ENCOUNTER — TELEPHONE (OUTPATIENT)
Dept: SURGERY | Facility: CLINIC | Age: 64
End: 2024-06-13
Payer: COMMERCIAL

## 2024-06-13 NOTE — TELEPHONE ENCOUNTER
General Call    Contacts         Type Contact Phone/Fax    06/13/2024 02:38 PM CDT Phone (Incoming) MarcelloYuliana (Self) [phone] (M)          Reason for Call:  pt called to resched her 6/6 missed appt.    Please not that she was just being released that day from long hospital stay for back surg.

## 2024-06-13 NOTE — TELEPHONE ENCOUNTER
Spoke to the patient about her recent back surgery and all that has transpired since then.  She currently has a wound vac in place and is working towards healing.  Her appointment was pushed back to October and we will just plan to do her annual labs at that time and a reminder was placed.  Cecelia Villagomez RN

## 2024-06-19 DIAGNOSIS — R63.2 HYPERPHAGIA: ICD-10-CM

## 2024-06-19 DIAGNOSIS — E66.811 OBESITY (BMI 30.0-34.9): ICD-10-CM

## 2024-06-19 RX ORDER — PHENTERMINE HYDROCHLORIDE 37.5 MG/1
TABLET ORAL
Qty: 90 TABLET | Refills: 1 | Status: SHIPPED | OUTPATIENT
Start: 2024-06-19 | End: 2024-08-07

## 2024-07-29 ENCOUNTER — TELEPHONE (OUTPATIENT)
Dept: SURGERY | Facility: CLINIC | Age: 64
End: 2024-07-29

## 2024-07-29 NOTE — TELEPHONE ENCOUNTER
Pt called in to report that she was pulling her bottle of phentermine out of the medicine cabinet and she dropped it into the wet/soapy sink. The entire bottle spilled and the pills started to dissolve. She was unable to save any of them. She would like  to put in a new script and call the pharmacy to let them know it's ok to fill it again early. I told her that I am going to have to check in with  and she verbalized understanding.    Sandy Delacruz RN, CBN  [facility]  P [phone]  F [phone]

## 2024-08-07 DIAGNOSIS — R63.2 HYPERPHAGIA: ICD-10-CM

## 2024-08-07 DIAGNOSIS — E66.811 OBESITY (BMI 30.0-34.9): ICD-10-CM

## 2024-08-07 RX ORDER — PHENTERMINE HYDROCHLORIDE 37.5 MG/1
TABLET ORAL
Qty: 90 TABLET | Refills: 1 | Status: SHIPPED | OUTPATIENT
Start: 2024-08-07

## 2024-08-08 NOTE — TELEPHONE ENCOUNTER
sent in new script for Phentermine.     Sandy Delacruz RN, CBN  [facility]  P [phone]  F [phone]

## 2024-10-14 ENCOUNTER — MYC MEDICAL ADVICE (OUTPATIENT)
Dept: SURGERY | Facility: CLINIC | Age: 64
End: 2024-10-14
Payer: MEDICARE

## 2024-10-14 DIAGNOSIS — R74.8 ELEVATED LIVER ENZYMES: ICD-10-CM

## 2024-10-14 DIAGNOSIS — Z98.84 S/P BARIATRIC SURGERY: ICD-10-CM

## 2024-10-14 DIAGNOSIS — E66.811 OBESITY (BMI 30.0-34.9): Primary | ICD-10-CM

## 2024-10-14 DIAGNOSIS — K90.9 INTESTINAL MALABSORPTION, UNSPECIFIED TYPE: ICD-10-CM

## 2024-10-14 NOTE — TELEPHONE ENCOUNTER
10 year post-op RNY  lab orders needed and will be done at her [facility] lab in preparation for appointment with Dorothy Coronado MD on 11/14/2024. Orders faxed.  Patient will get labs done as soon as she is able.  She is recovering from a back surgery that had some complications of wound care.  She was in the hospital for more than a month.    Cecelia Villagomez RN, CBN   [facility]

## 2024-10-18 ENCOUNTER — LAB (OUTPATIENT)
Dept: LAB | Facility: CLINIC | Age: 64
End: 2024-10-18
Payer: MEDICARE

## 2024-10-18 DIAGNOSIS — Z98.84 S/P BARIATRIC SURGERY: ICD-10-CM

## 2024-10-18 DIAGNOSIS — K90.9 INTESTINAL MALABSORPTION, UNSPECIFIED TYPE: ICD-10-CM

## 2024-10-18 DIAGNOSIS — R74.8 ELEVATED LIVER ENZYMES: ICD-10-CM

## 2024-10-18 LAB
ALBUMIN SERPL BCG-MCNC: 4.6 G/DL (ref 3.5–5.2)
ALP SERPL-CCNC: 316 U/L (ref 40–150)
ALT SERPL W P-5'-P-CCNC: 43 U/L (ref 0–50)
ANION GAP SERPL CALCULATED.3IONS-SCNC: 11 MMOL/L (ref 7–15)
AST SERPL W P-5'-P-CCNC: 65 U/L (ref 0–45)
BILIRUB SERPL-MCNC: 0.5 MG/DL
BUN SERPL-MCNC: 18 MG/DL (ref 8–23)
CALCIUM SERPL-MCNC: 10 MG/DL (ref 8.8–10.4)
CHLORIDE SERPL-SCNC: 101 MMOL/L (ref 98–107)
CHOLEST SERPL-MCNC: 205 MG/DL
CREAT SERPL-MCNC: 0.71 MG/DL (ref 0.51–0.95)
EGFRCR SERPLBLD CKD-EPI 2021: >90 ML/MIN/1.73M2
ERYTHROCYTE [DISTWIDTH] IN BLOOD BY AUTOMATED COUNT: 12.9 % (ref 10–15)
EST. AVERAGE GLUCOSE BLD GHB EST-MCNC: 108 MG/DL
FASTING STATUS PATIENT QL REPORTED: ABNORMAL
FASTING STATUS PATIENT QL REPORTED: ABNORMAL
FERRITIN SERPL-MCNC: 80 NG/ML (ref 11–328)
FOLATE SERPL-MCNC: >40 NG/ML (ref 4.6–34.8)
GLUCOSE SERPL-MCNC: 91 MG/DL (ref 70–99)
HBA1C MFR BLD: 5.4 % (ref 0–5.6)
HCO3 SERPL-SCNC: 29 MMOL/L (ref 22–29)
HCT VFR BLD AUTO: 39.8 % (ref 35–47)
HDLC SERPL-MCNC: 100 MG/DL
HGB BLD-MCNC: 12.7 G/DL (ref 11.7–15.7)
LDLC SERPL CALC-MCNC: 86 MG/DL
MCH RBC QN AUTO: 29.5 PG (ref 26.5–33)
MCHC RBC AUTO-ENTMCNC: 31.9 G/DL (ref 31.5–36.5)
MCV RBC AUTO: 93 FL (ref 78–100)
NONHDLC SERPL-MCNC: 105 MG/DL
PLATELET # BLD AUTO: 309 10E3/UL (ref 150–450)
POTASSIUM SERPL-SCNC: 4.1 MMOL/L (ref 3.4–5.3)
PROT SERPL-MCNC: 8.1 G/DL (ref 6.4–8.3)
PTH-INTACT SERPL-MCNC: 43 PG/ML (ref 15–65)
RBC # BLD AUTO: 4.3 10E6/UL (ref 3.8–5.2)
SODIUM SERPL-SCNC: 141 MMOL/L (ref 135–145)
TRIGL SERPL-MCNC: 95 MG/DL
VIT B12 SERPL-MCNC: 3870 PG/ML (ref 232–1245)
WBC # BLD AUTO: 7.9 10E3/UL (ref 4–11)

## 2024-10-18 PROCEDURE — 80053 COMPREHEN METABOLIC PANEL: CPT

## 2024-10-18 PROCEDURE — 82306 VITAMIN D 25 HYDROXY: CPT

## 2024-10-18 PROCEDURE — 36415 COLL VENOUS BLD VENIPUNCTURE: CPT

## 2024-10-18 PROCEDURE — 83036 HEMOGLOBIN GLYCOSYLATED A1C: CPT

## 2024-10-18 PROCEDURE — 80061 LIPID PANEL: CPT

## 2024-10-18 PROCEDURE — 84590 ASSAY OF VITAMIN A: CPT | Mod: 90

## 2024-10-18 PROCEDURE — 82607 VITAMIN B-12: CPT

## 2024-10-18 PROCEDURE — 83970 ASSAY OF PARATHORMONE: CPT

## 2024-10-18 PROCEDURE — 82746 ASSAY OF FOLIC ACID SERUM: CPT

## 2024-10-18 PROCEDURE — 84630 ASSAY OF ZINC: CPT | Mod: 90

## 2024-10-18 PROCEDURE — 82728 ASSAY OF FERRITIN: CPT

## 2024-10-18 PROCEDURE — 99000 SPECIMEN HANDLING OFFICE-LAB: CPT

## 2024-10-18 PROCEDURE — 84425 ASSAY OF VITAMIN B-1: CPT | Mod: 90

## 2024-10-18 PROCEDURE — 85027 COMPLETE CBC AUTOMATED: CPT

## 2024-10-20 LAB — ZINC SERPL-MCNC: 83.7 UG/DL

## 2024-10-21 LAB
ANNOTATION COMMENT IMP: NORMAL
RETINYL PALMITATE SERPL-MCNC: 0.03 MG/L
VIT A SERPL-MCNC: 0.75 MG/L

## 2024-10-22 LAB — VIT B1 PYROPHOSHATE BLD-SCNC: 206 NMOL/L

## 2024-10-26 LAB
DEPRECATED CALCIDIOL+CALCIFEROL SERPL-MC: 97 UG/L (ref 20–75)
VITAMIN D2 SERPL-MCNC: 10 UG/L
VITAMIN D3 SERPL-MCNC: 87 UG/L

## 2024-11-14 ENCOUNTER — OFFICE VISIT (OUTPATIENT)
Dept: SURGERY | Facility: CLINIC | Age: 64
End: 2024-11-14
Payer: COMMERCIAL

## 2024-11-14 VITALS
WEIGHT: 196 LBS | BODY MASS INDEX: 34.73 KG/M2 | HEIGHT: 63 IN | DIASTOLIC BLOOD PRESSURE: 82 MMHG | SYSTOLIC BLOOD PRESSURE: 144 MMHG

## 2024-11-14 DIAGNOSIS — E66.811 OBESITY (BMI 30.0-34.9): ICD-10-CM

## 2024-11-14 DIAGNOSIS — K91.2 POSTOPERATIVE MALABSORPTION: Primary | ICD-10-CM

## 2024-11-14 DIAGNOSIS — R63.2 HYPERPHAGIA: ICD-10-CM

## 2024-11-14 PROCEDURE — 99214 OFFICE O/P EST MOD 30 MIN: CPT | Performed by: FAMILY MEDICINE

## 2024-11-14 RX ORDER — LANCETS
EACH MISCELLANEOUS DAILY
COMMUNITY
Start: 2024-08-13

## 2024-11-14 RX ORDER — NITROFURANTOIN 25; 75 MG/1; MG/1
100 CAPSULE ORAL 2 TIMES DAILY
COMMUNITY
Start: 2024-10-17

## 2024-11-14 RX ORDER — PHENTERMINE HYDROCHLORIDE 37.5 MG/1
TABLET ORAL
Qty: 90 TABLET | Refills: 1 | Status: SHIPPED | OUTPATIENT
Start: 2024-11-14

## 2024-11-14 RX ORDER — NUTRITIONAL SUPPLEMENT
POWDER (GRAM) ORAL
COMMUNITY

## 2024-11-14 RX ORDER — CETIRIZINE HYDROCHLORIDE 10 MG/1
10 TABLET ORAL DAILY
COMMUNITY

## 2024-11-14 RX ORDER — MICONAZOLE NITRATE 2 %
CREAM WITH APPLICATOR VAGINAL AT BEDTIME
COMMUNITY
Start: 2024-11-11

## 2024-11-14 RX ORDER — TROSPIUM CHLORIDE 20 MG/1
20 TABLET, FILM COATED ORAL 2 TIMES DAILY
COMMUNITY
Start: 2024-10-11 | End: 2025-10-11

## 2024-11-14 RX ORDER — METHOCARBAMOL 500 MG/1
500 TABLET, FILM COATED ORAL 3 TIMES DAILY
COMMUNITY
Start: 2024-06-07

## 2024-11-14 RX ORDER — TRIAMCINOLONE ACETONIDE 1 MG/ML
LOTION TOPICAL 2 TIMES DAILY
COMMUNITY
Start: 2024-10-11

## 2024-11-14 RX ORDER — OXYBUTYNIN CHLORIDE 5 MG/1
5 TABLET ORAL
COMMUNITY
Start: 2024-05-12

## 2024-11-14 RX ORDER — CEPHALEXIN 500 MG/1
500 CAPSULE ORAL 2 TIMES DAILY
COMMUNITY
Start: 2024-07-10 | End: 2024-11-14

## 2024-11-14 NOTE — PROGRESS NOTES
Bariatric Follow Up Visit with a History of Previous Bariatric Surgery     Date of visit: 11/14/2024  Physician: Dorothy Coronado MD, MD  Primary Care Provider:  Luan Mccray SATHISH Marcello   64 year old  female    Date of Surgery: 4/23/2014  Initial Weight: 231#  Initial BMI: 38.9  Today's Weight:   Wt Readings from Last 1 Encounters:   11/14/24 88.9 kg (196 lb)     Body mass index is 34.72 kg/m .      Assessment and Plan     Assessment: Yuliana is a 64 year old year old female who is 10.5 yrs s/p  Jose Daniel en Y Gastric Bypass with Dr. Laura Bridges SATHISH Marcello feels as if she has achieved the goals she hoped to accomplish through bariatric surgery and weight loss.    Encounter Diagnoses   Name Primary?    Postoperative malabsorption Yes    Obesity (BMI 30.0-34.9)     Hyperphagia          Current Outpatient Medications:     acetaminophen (TYLENOL) 500 MG tablet, Take 500-1,000 mg by mouth as needed, Disp: , Rfl:     ADVAIR -21 mcg/actuation inhaler, [ADVAIR -21 MCG/ACTUATION INHALER] Inhale 1 puff 2 (two) times a day., Disp: , Rfl: 11    albuterol (PROVENTIL HFA;VENTOLIN HFA) 90 mcg/actuation inhaler, [ALBUTEROL (PROVENTIL HFA;VENTOLIN HFA) 90 MCG/ACTUATION INHALER] Inhale 2 puffs every 6 (six) hours as needed for wheezing., Disp: , Rfl:     albuterol (PROVENTIL) 2.5 mg /3 mL (0.083 %) nebulizer solution, [ALBUTEROL (PROVENTIL) 2.5 MG /3 ML (0.083 %) NEBULIZER SOLUTION] Take 2.5 mg by nebulization every 6 (six) hours as needed for wheezing., Disp: , Rfl:     amLODIPine (NORVASC) 5 MG tablet, [AMLODIPINE (NORVASC) 5 MG TABLET] Take 2.5 mg by mouth daily. , Disp: , Rfl:     atorvastatin (LIPITOR) 20 MG tablet, [ATORVASTATIN (LIPITOR) 20 MG TABLET] Take 20 mg by mouth bedtime., Disp: , Rfl:     CALCIUM CITRATE/VITAMIN D3 (CITRACAL + D PETITES ORAL), [CALCIUM CITRATE/VITAMIN D3 (CITRACAL + D PETITES ORAL)] Take 2 tablets by mouth 2 (two) times a day. , Disp: , Rfl:     carvedilol (COREG) 3.125  MG tablet, [CARVEDILOL (COREG) 3.125 MG TABLET] Take 3.125 mg by mouth bedtime. , Disp: , Rfl:     cetirizine (ZYRTEC) 10 MG tablet, Take 10 mg by mouth daily., Disp: , Rfl:     cholecalciferol (VITAMIN D3) 125 mcg (5000 units) capsule, Take 125 mcg by mouth daily, Disp: , Rfl:     clotrimazole (LOTRIMIN) 1 % external cream, Apply topically 2 times daily, Disp: 60 g, Rfl: 11    Colloidal Oatmeal 1 % CREA, Apply topically., Disp: , Rfl:     CONTOUR NEXT TEST STRIPS strips, [CONTOUR NEXT TEST STRIPS STRIPS] daily. TEST ONCE DAILY, Disp: , Rfl:     cyanocobalamin, vitamin B-12, 1,000 mcg Subl, [CYANOCOBALAMIN, VITAMIN B-12, 1,000 MCG SUBL] Place 1 tablet (1,000 mcg total) under the tongue daily., Disp: 90 tablet, Rfl: prn    diltiazem (CARDIZEM) 60 MG tablet, Take 1 tablet by mouth 4 times daily., Disp: , Rfl:     fluocinonide (LIDEX) 0.05 % cream, [FLUOCINONIDE (LIDEX) 0.05 % CREAM] Apply 1 application topically 2 (two) times a day., Disp: , Rfl:     gabapentin (NEURONTIN) 100 MG capsule, Take 100 mg by mouth once. Unsure of does, Disp: , Rfl:     gabapentin (NEURONTIN) 300 MG capsule, , Disp: , Rfl:     hydrocortisone 2.5 % cream, [HYDROCORTISONE 2.5 % CREAM] Apply 1 application topically as needed., Disp: , Rfl:     levETIRAcetam (KEPPRA) 1000 MG tablet, Take 1 tablet by mouth 2 times daily., Disp: , Rfl: 3    losartan (COZAAR) 100 MG tablet, , Disp: , Rfl:     methocarbamol (ROBAXIN) 500 MG tablet, Take 500 mg by mouth 3 times daily., Disp: , Rfl:     miconazole (MICATIN) 2 % cream, Place vaginally at bedtime., Disp: , Rfl:     Microlet Lancets MISC, daily., Disp: , Rfl:     montelukast (SINGULAIR) 10 mg tablet, [MONTELUKAST (SINGULAIR) 10 MG TABLET] Take 10 mg by mouth bedtime., Disp: , Rfl:     nitroFURantoin macrocrystal-monohydrate (MACROBID) 100 MG capsule, Take 100 mg by mouth 2 times daily., Disp: , Rfl:     Nutritional Supplements (JACQUELIN) POWD, , Disp: , Rfl:     omeprazole (PRILOSEC) 20 MG capsule, Take 20  "mg by mouth daily, Disp: , Rfl:     oxyBUTYnin (DITROPAN) 5 MG tablet, Take 5 mg by mouth., Disp: , Rfl:     pediatric multivit-iron-min (CEROVITE JR) Chew, [PEDIATRIC MULTIVIT-IRON-MIN (CEROVITE JR) CHEW] Chew 1 tablet 2 (two) times a day., Disp: 180 each, Rfl: 3    Pediatric Multivitamins-Iron (CEROVITE JR) 18 MG chewable tablet, , Disp: , Rfl:     phentermine (ADIPEX-P) 37.5 MG tablet, TAKE 1/2 TO 1 (ONE-HALF TO ONE) TABLET BY MOUTH ONCE DAILY BEFORE BREAKFAST, Disp: 90 tablet, Rfl: 1    Propylene Glycol, PF, 0.6 % SOLN, 1 drop., Disp: , Rfl:     QUEtiapine (SEROQUEL) 25 MG tablet, [QUETIAPINE (SEROQUEL) 25 MG TABLET] Take 25 mg by mouth at bedtime., Disp: , Rfl:     SF 5000 PLUS 1.1 % Crea, [SF 5000 PLUS 1.1 % CREA] Apply 1 application topically as needed., Disp: , Rfl: 0    sodium fluoride dental gel (PREVIDENT) 1.1 % GEL topical gel, BRUSH TEETH WITH GEL TWICE DAILY. DO NOT EAT OR DRINK FOR 30 MINUTES AFTER, Disp: , Rfl:     SYMBICORT 80-4.5 MCG/ACT Inhaler, INHALE 2 PUFFS BY MOUTH TWICE DAILY. RINSE MOUTH /. GARGLE AFTER USE, Disp: , Rfl:     traZODone (DESYREL) 50 MG tablet, [TRAZODONE (DESYREL) 50 MG TABLET] at bedtime as needed., Disp: , Rfl:     triamcinolone (KENALOG) 0.1 % external lotion, Apply topically 2 times daily., Disp: , Rfl:     trospium (SANCTURA) 20 MG tablet, Take 20 mg by mouth 2 times daily., Disp: , Rfl:     venlafaxine (EFFEXOR-XR) 150 MG 24 hr capsule, [VENLAFAXINE (EFFEXOR-XR) 150 MG 24 HR CAPSULE] Take 1 capsule by mouth daily., Disp: , Rfl: 11     Plan: Continue vitamins with consistency and Sean. Refill phentermine. RD prn. Given list of food shelves within 5 miles from her house. Recommend advocating for herself regarding disability insurance. Look in to [employer] benefits for ongoing spine stretching strengthening.     6 month follow up    Bariatric Surgery Review     Interim History/LifeChanges: Her back surgery \"didn't go well\" She had an anterior approach followed by a " "posterior approach. She developed an infection and had a wound vac and was in [facility] for 1 month. She has problems with urinating. She had a ureteral stent placed. Maintaining a 35# weight loss. Taking Sena 2X/d. Spending time with her sister.     Patient Concerns: needs refill  Appetite (1-10): OK  GERD: no    Medication changes: yes    Vitamin Intake:   B-12   SL   MVI  2/d   Vitamin D  5,000   Calcium   citrate     Other                LABS: \"Reviewed  excellent  Nausea no  Vomiting no  Constipation yes  Diarrhea no  Rashes scar she doesn't like  Hair Loss no  Calf tenderness no  Breathing difficulty no  Reactive Hypoglycemia no  Light Headedness no   Moods euthymuc    12 point ROS as above and otherwise negative      Habits:  Alcohol: none  Tobacco: no  Caffeine occ  NSAIDS no  Exercise Routine: PT and walking, can walk without her cane but uses for stability  3 meals/day yes  Protein first Sean 2X/d  +60grams/day  Water Separate from meals yes  Calorie Containing Beverages no  Restaurant eating/wk with her sister  Sleeping with trazodone 6 hours.   Stress high  CPAP: with consistency  Contraception: PM  DEXA:done with ortho    Social History     Social History     Socioeconomic History    Marital status: Single     Spouse name: Not on file    Number of children: Not on file    Years of education: Not on file    Highest education level: Not on file   Occupational History    Not on file   Tobacco Use    Smoking status: Former    Smokeless tobacco: Never    Tobacco comments:     quit over 25 years ago   Substance and Sexual Activity    Alcohol use: Yes     Comment: Alcoholic Drinks/day: Rarely    Drug use: No    Sexual activity: Not Currently     Partners: Male     Birth control/protection: Surgical   Other Topics Concern    Not on file   Social History Narrative    Single. Lives with her dog.  And her fiance (her children's father) on and off     Social Drivers of Health     Financial Resource Strain: Not on " file   Food Insecurity: No Food Insecurity (5/16/2024)    Received from [facility]    Hunger Vital Sign     Worried About Running Out of Food in the Last Year: Never true     Ran Out of Food in the Last Year: Never true   Transportation Needs: Not on file   Physical Activity: Not on file   Stress: Not on file   Social Connections: Not on file   Interpersonal Safety: Unknown (5/28/2024)    Received from [facility]    Humiliation, Afraid, Rape, and Kick questionnaire     Fear of Current or Ex-Partner: Not on file     Emotionally Abused: Not on file     Physically Abused: No     Sexually Abused: No   Housing Stability: Not on file       Past Medical History     Past Medical History:   Diagnosis Date    Asthma     Back pain     Congestive heart failure (H)     COPD (chronic obstructive pulmonary disease) (H)     Degenerative disc disease     Diabetes type 2, controlled (H)     Dyslipidemia     Esophageal dyskinesia     GERD (gastroesophageal reflux disease)     Gout     Hepatic steatosis     nodular appearance suggesting fibrosis/cirrhosis    Hyperparathyroidism (H)     Hyperparathyroidism, secondary (H)     Hypertension     Low bone mass     Metabolic syndrome     Morbid obesity (H)     Osteoarthritis     Reflux      Problem List     Patient Active Problem List   Diagnosis    Congestive heart failure, unspecified HF chronicity, unspecified heart failure type (H)    Esophageal dyskinesia    Non-cardiac chest pain    Regurgitation of food    Esophageal dysphagia    Morbid obesity (H)    Elevated TSH    Elevated liver enzymes    Alkaline phosphatase elevation    Hepatic steatosis     Medications       Current Outpatient Medications:     acetaminophen (TYLENOL) 500 MG tablet, Take 500-1,000 mg by mouth as needed, Disp: , Rfl:     ADVAIR -21 mcg/actuation inhaler, [ADVAIR -21 MCG/ACTUATION INHALER] Inhale 1 puff 2 (two) times a day., Disp: , Rfl: 11    albuterol (PROVENTIL HFA;VENTOLIN HFA) 90  mcg/actuation inhaler, [ALBUTEROL (PROVENTIL HFA;VENTOLIN HFA) 90 MCG/ACTUATION INHALER] Inhale 2 puffs every 6 (six) hours as needed for wheezing., Disp: , Rfl:     albuterol (PROVENTIL) 2.5 mg /3 mL (0.083 %) nebulizer solution, [ALBUTEROL (PROVENTIL) 2.5 MG /3 ML (0.083 %) NEBULIZER SOLUTION] Take 2.5 mg by nebulization every 6 (six) hours as needed for wheezing., Disp: , Rfl:     amLODIPine (NORVASC) 5 MG tablet, [AMLODIPINE (NORVASC) 5 MG TABLET] Take 2.5 mg by mouth daily. , Disp: , Rfl:     atorvastatin (LIPITOR) 20 MG tablet, [ATORVASTATIN (LIPITOR) 20 MG TABLET] Take 20 mg by mouth bedtime., Disp: , Rfl:     CALCIUM CITRATE/VITAMIN D3 (CITRACAL + D PETITES ORAL), [CALCIUM CITRATE/VITAMIN D3 (CITRACAL + D PETITES ORAL)] Take 2 tablets by mouth 2 (two) times a day. , Disp: , Rfl:     carvedilol (COREG) 3.125 MG tablet, [CARVEDILOL (COREG) 3.125 MG TABLET] Take 3.125 mg by mouth bedtime. , Disp: , Rfl:     cetirizine (ZYRTEC) 10 MG tablet, Take 10 mg by mouth daily., Disp: , Rfl:     cholecalciferol (VITAMIN D3) 125 mcg (5000 units) capsule, Take 125 mcg by mouth daily, Disp: , Rfl:     clotrimazole (LOTRIMIN) 1 % external cream, Apply topically 2 times daily, Disp: 60 g, Rfl: 11    Colloidal Oatmeal 1 % CREA, Apply topically., Disp: , Rfl:     CONTOUR NEXT TEST STRIPS strips, [CONTOUR NEXT TEST STRIPS STRIPS] daily. TEST ONCE DAILY, Disp: , Rfl:     cyanocobalamin, vitamin B-12, 1,000 mcg Subl, [CYANOCOBALAMIN, VITAMIN B-12, 1,000 MCG SUBL] Place 1 tablet (1,000 mcg total) under the tongue daily., Disp: 90 tablet, Rfl: prn    diltiazem (CARDIZEM) 60 MG tablet, Take 1 tablet by mouth 4 times daily., Disp: , Rfl:     fluocinonide (LIDEX) 0.05 % cream, [FLUOCINONIDE (LIDEX) 0.05 % CREAM] Apply 1 application topically 2 (two) times a day., Disp: , Rfl:     gabapentin (NEURONTIN) 100 MG capsule, Take 100 mg by mouth once. Unsure of does, Disp: , Rfl:     gabapentin (NEURONTIN) 300 MG capsule, , Disp: , Rfl:      hydrocortisone 2.5 % cream, [HYDROCORTISONE 2.5 % CREAM] Apply 1 application topically as needed., Disp: , Rfl:     levETIRAcetam (KEPPRA) 1000 MG tablet, Take 1 tablet by mouth 2 times daily., Disp: , Rfl: 3    losartan (COZAAR) 100 MG tablet, , Disp: , Rfl:     methocarbamol (ROBAXIN) 500 MG tablet, Take 500 mg by mouth 3 times daily., Disp: , Rfl:     miconazole (MICATIN) 2 % cream, Place vaginally at bedtime., Disp: , Rfl:     Microlet Lancets MISC, daily., Disp: , Rfl:     montelukast (SINGULAIR) 10 mg tablet, [MONTELUKAST (SINGULAIR) 10 MG TABLET] Take 10 mg by mouth bedtime., Disp: , Rfl:     nitroFURantoin macrocrystal-monohydrate (MACROBID) 100 MG capsule, Take 100 mg by mouth 2 times daily., Disp: , Rfl:     Nutritional Supplements (JACQUELIN) POWD, , Disp: , Rfl:     omeprazole (PRILOSEC) 20 MG capsule, Take 20 mg by mouth daily, Disp: , Rfl:     oxyBUTYnin (DITROPAN) 5 MG tablet, Take 5 mg by mouth., Disp: , Rfl:     pediatric multivit-iron-min (CEROVITE JR) Chew, [PEDIATRIC MULTIVIT-IRON-MIN (CEROVITE JR) CHEW] Chew 1 tablet 2 (two) times a day., Disp: 180 each, Rfl: 3    Pediatric Multivitamins-Iron (CEROVITE JR) 18 MG chewable tablet, , Disp: , Rfl:     phentermine (ADIPEX-P) 37.5 MG tablet, TAKE 1/2 TO 1 (ONE-HALF TO ONE) TABLET BY MOUTH ONCE DAILY BEFORE BREAKFAST, Disp: 90 tablet, Rfl: 1    Propylene Glycol, PF, 0.6 % SOLN, 1 drop., Disp: , Rfl:     QUEtiapine (SEROQUEL) 25 MG tablet, [QUETIAPINE (SEROQUEL) 25 MG TABLET] Take 25 mg by mouth at bedtime., Disp: , Rfl:     SF 5000 PLUS 1.1 % Crea, [SF 5000 PLUS 1.1 % CREA] Apply 1 application topically as needed., Disp: , Rfl: 0    sodium fluoride dental gel (PREVIDENT) 1.1 % GEL topical gel, BRUSH TEETH WITH GEL TWICE DAILY. DO NOT EAT OR DRINK FOR 30 MINUTES AFTER, Disp: , Rfl:     SYMBICORT 80-4.5 MCG/ACT Inhaler, INHALE 2 PUFFS BY MOUTH TWICE DAILY. RINSE MOUTH /. GARGLE AFTER USE, Disp: , Rfl:     traZODone (DESYREL) 50 MG tablet, [TRAZODONE  "(DESYREL) 50 MG TABLET] at bedtime as needed., Disp: , Rfl:     triamcinolone (KENALOG) 0.1 % external lotion, Apply topically 2 times daily., Disp: , Rfl:     trospium (SANCTURA) 20 MG tablet, Take 20 mg by mouth 2 times daily., Disp: , Rfl:     venlafaxine (EFFEXOR-XR) 150 MG 24 hr capsule, [VENLAFAXINE (EFFEXOR-XR) 150 MG 24 HR CAPSULE] Take 1 capsule by mouth daily., Disp: , Rfl: 11   Surgical History     Past Surgical History  She has a past surgical history that includes Revision Jose Daniel-En-Y (4/23/2014); Cholecystectomy; Hysterectomy; tubal ligation; Total Knee Arthroplasty (Left, 03/2019); Arthroscopy knee with meniscal repair (Left, 01/2019); Esophagoscopy, gastroscopy, duodenoscopy (EGD), combined (N/A, 4/26/2022); Esophageal Balloon Provocation Study (N/A, 4/26/2022); Esophagogastroduodenoscopy, With Botulinum Toxin Injection (4/26/2022); and IR Lumbar Puncture (11/17/2023).    Objective-Exam     Constitutional:  BP (!) 144/82   Ht 1.6 m (5' 3\")   Wt 88.9 kg (196 lb)   BMI 34.72 kg/m      General:  Pleasant and in no acute distress   Eyes:  EOMI  ENT:  Airway 1+  Moist mucous membranes  Neck:  Supple, No LAD, No thyromegaly, No carotid bruits appreciated  Respiratory: Normal respiratory effort, no cough, wheezes or crackles  CV:  Regular rate and Rhythm,no murmurs, pulses 2+, no calf tenderness, no LE edema  Gastrointestinal: Abdomen NT/ND, BS+  Musculoskeletal: muscle mass WNL  Skin: color brown hair full, incisions nicely healed  Neurological: No tremor, cane assisted gait  Psychiatric: alert and oriented X3, mood and affect normal    Counseling     We reviewed the important post op bariatric recommendations:  -eating 3 meals daily  -eating protein first, getting >60gm protein daily  -eating slowly, chewing food well  -avoiding/limiting calorie containing beverages  -drinking water 15-30 minutes before or after meals  -choosing wheat, not white with breads, crackers, pastas, micheal, bagels, tortillas, " rice  -limiting restaurant or cafeteria eating to twice a week or less    We discussed the importance of restorative sleep and stress management in maintaining a healthy weight.  We discussed the National Weight Control Registry healthy weight maintenance strategies and ways to optimize metabolism.  We discussed the importance of physical activity including cardiovascular and strength training in maintaining a healthier weight.    We discussed the importance of life-long vitamin supplementation and life-long  follow-up.    Yuliana was reminded that, to avoid marginal ulcers she should avoid tobacco at all, alcohol in excess, caffeine in excess, and NSAIDS (unless indicated for cardioprotection or othewise and opposed by a PPI).    Dorothy Coronado MD, MD, [facility].  11/14/2024  10:03 AM      No images are attached to the encounter.  Total time spent on the date of this encounter doing: chart review, review of test results, patient visit, physical exam, education, counseling, developing plan of care, and documenting = 30 minutes.

## 2024-11-14 NOTE — LETTER
11/14/2024      Yuliana Armendariz  [address]  [facility] [address]      Dear Colleague,    Thank you for referring your patient, Yuliana Armendariz, to the [facility] [place]. Please see a copy of my visit note below.    Bariatric Follow Up Visit with a History of Previous Bariatric Surgery     Date of visit: 11/14/2024  Physician: Dorothy Coronado MD, MD  Primary Care Provider:  Luan Mccray  Yuliana Armendariz   64 year old  female    Date of Surgery: 4/23/2014  Initial Weight: 231#  Initial BMI: 38.9  Today's Weight:   Wt Readings from Last 1 Encounters:   11/14/24 88.9 kg (196 lb)     Body mass index is 34.72 kg/m .      Assessment and Plan     Assessment: Yuliana is a 64 year old year old female who is 10.5 yrs s/p  Jose Daniel en Y Gastric Bypass with Dr. Sanchez  Yuliana Armendariz feels as if she has achieved the goals she hoped to accomplish through bariatric surgery and weight loss.    Encounter Diagnoses   Name Primary?     Postoperative malabsorption Yes     Obesity (BMI 30.0-34.9)      Hyperphagia          Current Outpatient Medications:      acetaminophen (TYLENOL) 500 MG tablet, Take 500-1,000 mg by mouth as needed, Disp: , Rfl:      ADVAIR -21 mcg/actuation inhaler, [ADVAIR -21 MCG/ACTUATION INHALER] Inhale 1 puff 2 (two) times a day., Disp: , Rfl: 11     albuterol (PROVENTIL HFA;VENTOLIN HFA) 90 mcg/actuation inhaler, [ALBUTEROL (PROVENTIL HFA;VENTOLIN HFA) 90 MCG/ACTUATION INHALER] Inhale 2 puffs every 6 (six) hours as needed for wheezing., Disp: , Rfl:      albuterol (PROVENTIL) 2.5 mg /3 mL (0.083 %) nebulizer solution, [ALBUTEROL (PROVENTIL) 2.5 MG /3 ML (0.083 %) NEBULIZER SOLUTION] Take 2.5 mg by nebulization every 6 (six) hours as needed for wheezing., Disp: , Rfl:      amLODIPine (NORVASC) 5 MG tablet, [AMLODIPINE (NORVASC) 5 MG TABLET] Take 2.5 mg by mouth daily. , Disp: , Rfl:      atorvastatin (LIPITOR) 20 MG tablet,  [ATORVASTATIN (LIPITOR) 20 MG TABLET] Take 20 mg by mouth bedtime., Disp: , Rfl:      CALCIUM CITRATE/VITAMIN D3 (CITRACAL + D PETITES ORAL), [CALCIUM CITRATE/VITAMIN D3 (CITRACAL + D PETITES ORAL)] Take 2 tablets by mouth 2 (two) times a day. , Disp: , Rfl:      carvedilol (COREG) 3.125 MG tablet, [CARVEDILOL (COREG) 3.125 MG TABLET] Take 3.125 mg by mouth bedtime. , Disp: , Rfl:      cetirizine (ZYRTEC) 10 MG tablet, Take 10 mg by mouth daily., Disp: , Rfl:      cholecalciferol (VITAMIN D3) 125 mcg (5000 units) capsule, Take 125 mcg by mouth daily, Disp: , Rfl:      clotrimazole (LOTRIMIN) 1 % external cream, Apply topically 2 times daily, Disp: 60 g, Rfl: 11     Colloidal Oatmeal 1 % CREA, Apply topically., Disp: , Rfl:      CONTOUR NEXT TEST STRIPS strips, [CONTOUR NEXT TEST STRIPS STRIPS] daily. TEST ONCE DAILY, Disp: , Rfl:      cyanocobalamin, vitamin B-12, 1,000 mcg Subl, [CYANOCOBALAMIN, VITAMIN B-12, 1,000 MCG SUBL] Place 1 tablet (1,000 mcg total) under the tongue daily., Disp: 90 tablet, Rfl: prn     diltiazem (CARDIZEM) 60 MG tablet, Take 1 tablet by mouth 4 times daily., Disp: , Rfl:      fluocinonide (LIDEX) 0.05 % cream, [FLUOCINONIDE (LIDEX) 0.05 % CREAM] Apply 1 application topically 2 (two) times a day., Disp: , Rfl:      gabapentin (NEURONTIN) 100 MG capsule, Take 100 mg by mouth once. Unsure of does, Disp: , Rfl:      gabapentin (NEURONTIN) 300 MG capsule, , Disp: , Rfl:      hydrocortisone 2.5 % cream, [HYDROCORTISONE 2.5 % CREAM] Apply 1 application topically as needed., Disp: , Rfl:      levETIRAcetam (KEPPRA) 1000 MG tablet, Take 1 tablet by mouth 2 times daily., Disp: , Rfl: 3     losartan (COZAAR) 100 MG tablet, , Disp: , Rfl:      methocarbamol (ROBAXIN) 500 MG tablet, Take 500 mg by mouth 3 times daily., Disp: , Rfl:      miconazole (MICATIN) 2 % cream, Place vaginally at bedtime., Disp: , Rfl:      Microlet Lancets MISC, daily., Disp: , Rfl:      montelukast (SINGULAIR) 10 mg tablet,  [MONTELUKAST (SINGULAIR) 10 MG TABLET] Take 10 mg by mouth bedtime., Disp: , Rfl:      nitroFURantoin macrocrystal-monohydrate (MACROBID) 100 MG capsule, Take 100 mg by mouth 2 times daily., Disp: , Rfl:      Nutritional Supplements (SEAN) POWD, , Disp: , Rfl:      omeprazole (PRILOSEC) 20 MG capsule, Take 20 mg by mouth daily, Disp: , Rfl:      oxyBUTYnin (DITROPAN) 5 MG tablet, Take 5 mg by mouth., Disp: , Rfl:      pediatric multivit-iron-min (CEROVITE JR) Chew, [PEDIATRIC MULTIVIT-IRON-MIN (CEROVITE JR) CHEW] Chew 1 tablet 2 (two) times a day., Disp: 180 each, Rfl: 3     Pediatric Multivitamins-Iron (CEROVITE JR) 18 MG chewable tablet, , Disp: , Rfl:      phentermine (ADIPEX-P) 37.5 MG tablet, TAKE 1/2 TO 1 (ONE-HALF TO ONE) TABLET BY MOUTH ONCE DAILY BEFORE BREAKFAST, Disp: 90 tablet, Rfl: 1     Propylene Glycol, PF, 0.6 % SOLN, 1 drop., Disp: , Rfl:      QUEtiapine (SEROQUEL) 25 MG tablet, [QUETIAPINE (SEROQUEL) 25 MG TABLET] Take 25 mg by mouth at bedtime., Disp: , Rfl:      SF 5000 PLUS 1.1 % Crea, [SF 5000 PLUS 1.1 % CREA] Apply 1 application topically as needed., Disp: , Rfl: 0     sodium fluoride dental gel (PREVIDENT) 1.1 % GEL topical gel, BRUSH TEETH WITH GEL TWICE DAILY. DO NOT EAT OR DRINK FOR 30 MINUTES AFTER, Disp: , Rfl:      SYMBICORT 80-4.5 MCG/ACT Inhaler, INHALE 2 PUFFS BY MOUTH TWICE DAILY. RINSE MOUTH /. GARGLE AFTER USE, Disp: , Rfl:      traZODone (DESYREL) 50 MG tablet, [TRAZODONE (DESYREL) 50 MG TABLET] at bedtime as needed., Disp: , Rfl:      triamcinolone (KENALOG) 0.1 % external lotion, Apply topically 2 times daily., Disp: , Rfl:      trospium (SANCTURA) 20 MG tablet, Take 20 mg by mouth 2 times daily., Disp: , Rfl:      venlafaxine (EFFEXOR-XR) 150 MG 24 hr capsule, [VENLAFAXINE (EFFEXOR-XR) 150 MG 24 HR CAPSULE] Take 1 capsule by mouth daily., Disp: , Rfl: 11     Plan: Continue vitamins with consistency and Sean. Refill phentermine. RD prn. Given list of food shelves within 5  "miles from her house. Recommend advocating for herself regarding disability insurance. Look in to [employer] benefits for ongoing spine stretching strengthening.     6 month follow up    Bariatric Surgery Review     Interim History/LifeChanges: Her back surgery \"didn't go well\" She had an anterior approach followed by a posterior approach. She developed an infection and had a wound vac and was in [facility] for 1 month. She has problems with urinating. She had a ureteral stent placed. Maintaining a 35# weight loss. Taking Sean 2X/d. Spending time with her sister.     Patient Concerns: needs refill  Appetite (1-10): OK  GERD: no    Medication changes: yes    Vitamin Intake:   B-12   SL   MVI  2/d   Vitamin D  5,000   Calcium   citrate     Other                LABS: \"Reviewed  excellent  Nausea no  Vomiting no  Constipation yes  Diarrhea no  Rashes scar she doesn't like  Hair Loss no  Calf tenderness no  Breathing difficulty no  Reactive Hypoglycemia no  Light Headedness no   Moods euthymuc    12 point ROS as above and otherwise negative      Habits:  Alcohol: none  Tobacco: no  Caffeine occ  NSAIDS no  Exercise Routine: PT and walking, can walk without her cane but uses for stability  3 meals/day yes  Protein first Sean 2X/d  +60grams/day  Water Separate from meals yes  Calorie Containing Beverages no  Restaurant eating/wk with her sister  Sleeping with trazodone 6 hours.   Stress high  CPAP: with consistency  Contraception: PM  DEXA:done with ortho    Social History     Social History     Socioeconomic History     Marital status: Single     Spouse name: Not on file     Number of children: Not on file     Years of education: Not on file     Highest education level: Not on file   Occupational History     Not on file   Tobacco Use     Smoking status: Former     Smokeless tobacco: Never     Tobacco comments:     quit over 25 years ago   Substance and Sexual Activity     Alcohol use: Yes     Comment: Alcoholic " Drinks/day: Rarely     Drug use: No     Sexual activity: Not Currently     Partners: Male     Birth control/protection: Surgical   Other Topics Concern     Not on file   Social History Narrative    Single. Lives with her dog.  And her fiance (her children's father) on and off     Social Drivers of Health     Financial Resource Strain: Not on file   Food Insecurity: No Food Insecurity (5/16/2024)    Received from [employer]    Hunger Vital Sign      Worried About Running Out of Food in the Last Year: Never true      Ran Out of Food in the Last Year: Never true   Transportation Needs: Not on file   Physical Activity: Not on file   Stress: Not on file   Social Connections: Not on file   Interpersonal Safety: Unknown (5/28/2024)    Received from [employer]    Humiliation, Afraid, Rape, and Kick questionnaire      Fear of Current or Ex-Partner: Not on file      Emotionally Abused: Not on file      Physically Abused: No      Sexually Abused: No   Housing Stability: Not on file       Past Medical History     Past Medical History:   Diagnosis Date     Asthma      Back pain      Congestive heart failure (H)      COPD (chronic obstructive pulmonary disease) (H)      Degenerative disc disease      Diabetes type 2, controlled (H)      Dyslipidemia      Esophageal dyskinesia      GERD (gastroesophageal reflux disease)      Gout      Hepatic steatosis     nodular appearance suggesting fibrosis/cirrhosis     Hyperparathyroidism (H)      Hyperparathyroidism, secondary (H)      Hypertension      Low bone mass      Metabolic syndrome      Morbid obesity (H)      Osteoarthritis      Reflux      Problem List     Patient Active Problem List   Diagnosis     Congestive heart failure, unspecified HF chronicity, unspecified heart failure type (H)     Esophageal dyskinesia     Non-cardiac chest pain     Regurgitation of food     Esophageal dysphagia     Morbid obesity (H)     Elevated TSH     Elevated liver enzymes     Alkaline  phosphatase elevation     Hepatic steatosis     Medications       Current Outpatient Medications:      acetaminophen (TYLENOL) 500 MG tablet, Take 500-1,000 mg by mouth as needed, Disp: , Rfl:      ADVAIR -21 mcg/actuation inhaler, [ADVAIR -21 MCG/ACTUATION INHALER] Inhale 1 puff 2 (two) times a day., Disp: , Rfl: 11     albuterol (PROVENTIL HFA;VENTOLIN HFA) 90 mcg/actuation inhaler, [ALBUTEROL (PROVENTIL HFA;VENTOLIN HFA) 90 MCG/ACTUATION INHALER] Inhale 2 puffs every 6 (six) hours as needed for wheezing., Disp: , Rfl:      albuterol (PROVENTIL) 2.5 mg /3 mL (0.083 %) nebulizer solution, [ALBUTEROL (PROVENTIL) 2.5 MG /3 ML (0.083 %) NEBULIZER SOLUTION] Take 2.5 mg by nebulization every 6 (six) hours as needed for wheezing., Disp: , Rfl:      amLODIPine (NORVASC) 5 MG tablet, [AMLODIPINE (NORVASC) 5 MG TABLET] Take 2.5 mg by mouth daily. , Disp: , Rfl:      atorvastatin (LIPITOR) 20 MG tablet, [ATORVASTATIN (LIPITOR) 20 MG TABLET] Take 20 mg by mouth bedtime., Disp: , Rfl:      CALCIUM CITRATE/VITAMIN D3 (CITRACAL + D PETITES ORAL), [CALCIUM CITRATE/VITAMIN D3 (CITRACAL + D PETITES ORAL)] Take 2 tablets by mouth 2 (two) times a day. , Disp: , Rfl:      carvedilol (COREG) 3.125 MG tablet, [CARVEDILOL (COREG) 3.125 MG TABLET] Take 3.125 mg by mouth bedtime. , Disp: , Rfl:      cetirizine (ZYRTEC) 10 MG tablet, Take 10 mg by mouth daily., Disp: , Rfl:      cholecalciferol (VITAMIN D3) 125 mcg (5000 units) capsule, Take 125 mcg by mouth daily, Disp: , Rfl:      clotrimazole (LOTRIMIN) 1 % external cream, Apply topically 2 times daily, Disp: 60 g, Rfl: 11     Colloidal Oatmeal 1 % CREA, Apply topically., Disp: , Rfl:      CONTOUR NEXT TEST STRIPS strips, [CONTOUR NEXT TEST STRIPS STRIPS] daily. TEST ONCE DAILY, Disp: , Rfl:      cyanocobalamin, vitamin B-12, 1,000 mcg Subl, [CYANOCOBALAMIN, VITAMIN B-12, 1,000 MCG SUBL] Place 1 tablet (1,000 mcg total) under the tongue daily., Disp: 90 tablet, Rfl: prn      diltiazem (CARDIZEM) 60 MG tablet, Take 1 tablet by mouth 4 times daily., Disp: , Rfl:      fluocinonide (LIDEX) 0.05 % cream, [FLUOCINONIDE (LIDEX) 0.05 % CREAM] Apply 1 application topically 2 (two) times a day., Disp: , Rfl:      gabapentin (NEURONTIN) 100 MG capsule, Take 100 mg by mouth once. Unsure of does, Disp: , Rfl:      gabapentin (NEURONTIN) 300 MG capsule, , Disp: , Rfl:      hydrocortisone 2.5 % cream, [HYDROCORTISONE 2.5 % CREAM] Apply 1 application topically as needed., Disp: , Rfl:      levETIRAcetam (KEPPRA) 1000 MG tablet, Take 1 tablet by mouth 2 times daily., Disp: , Rfl: 3     losartan (COZAAR) 100 MG tablet, , Disp: , Rfl:      methocarbamol (ROBAXIN) 500 MG tablet, Take 500 mg by mouth 3 times daily., Disp: , Rfl:      miconazole (MICATIN) 2 % cream, Place vaginally at bedtime., Disp: , Rfl:      Microlet Lancets MISC, daily., Disp: , Rfl:      montelukast (SINGULAIR) 10 mg tablet, [MONTELUKAST (SINGULAIR) 10 MG TABLET] Take 10 mg by mouth bedtime., Disp: , Rfl:      nitroFURantoin macrocrystal-monohydrate (MACROBID) 100 MG capsule, Take 100 mg by mouth 2 times daily., Disp: , Rfl:      Nutritional Supplements (JACQUELIN) POWD, , Disp: , Rfl:      omeprazole (PRILOSEC) 20 MG capsule, Take 20 mg by mouth daily, Disp: , Rfl:      oxyBUTYnin (DITROPAN) 5 MG tablet, Take 5 mg by mouth., Disp: , Rfl:      pediatric multivit-iron-min (CEROVITE JR) Chew, [PEDIATRIC MULTIVIT-IRON-MIN (CEROVITE JR) CHEW] Chew 1 tablet 2 (two) times a day., Disp: 180 each, Rfl: 3     Pediatric Multivitamins-Iron (CEROVITE JR) 18 MG chewable tablet, , Disp: , Rfl:      phentermine (ADIPEX-P) 37.5 MG tablet, TAKE 1/2 TO 1 (ONE-HALF TO ONE) TABLET BY MOUTH ONCE DAILY BEFORE BREAKFAST, Disp: 90 tablet, Rfl: 1     Propylene Glycol, PF, 0.6 % SOLN, 1 drop., Disp: , Rfl:      QUEtiapine (SEROQUEL) 25 MG tablet, [QUETIAPINE (SEROQUEL) 25 MG TABLET] Take 25 mg by mouth at bedtime., Disp: , Rfl:      SF 5000 PLUS 1.1 % Crea, [SF  "5000 PLUS 1.1 % CREA] Apply 1 application topically as needed., Disp: , Rfl: 0     sodium fluoride dental gel (PREVIDENT) 1.1 % GEL topical gel, BRUSH TEETH WITH GEL TWICE DAILY. DO NOT EAT OR DRINK FOR 30 MINUTES AFTER, Disp: , Rfl:      SYMBICORT 80-4.5 MCG/ACT Inhaler, INHALE 2 PUFFS BY MOUTH TWICE DAILY. RINSE MOUTH /. GARGLE AFTER USE, Disp: , Rfl:      traZODone (DESYREL) 50 MG tablet, [TRAZODONE (DESYREL) 50 MG TABLET] at bedtime as needed., Disp: , Rfl:      triamcinolone (KENALOG) 0.1 % external lotion, Apply topically 2 times daily., Disp: , Rfl:      trospium (SANCTURA) 20 MG tablet, Take 20 mg by mouth 2 times daily., Disp: , Rfl:      venlafaxine (EFFEXOR-XR) 150 MG 24 hr capsule, [VENLAFAXINE (EFFEXOR-XR) 150 MG 24 HR CAPSULE] Take 1 capsule by mouth daily., Disp: , Rfl: 11   Surgical History     Past Surgical History  She has a past surgical history that includes Revision Jose Daniel-En-Y (4/23/2014); Cholecystectomy; Hysterectomy; tubal ligation; Total Knee Arthroplasty (Left, 03/2019); Arthroscopy knee with meniscal repair (Left, 01/2019); Esophagoscopy, gastroscopy, duodenoscopy (EGD), combined (N/A, 4/26/2022); Esophageal Balloon Provocation Study (N/A, 4/26/2022); Esophagogastroduodenoscopy, With Botulinum Toxin Injection (4/26/2022); and IR Lumbar Puncture (11/17/2023).    Objective-Exam     Constitutional:  BP (!) 144/82   Ht 1.6 m (5' 3\")   Wt 88.9 kg (196 lb)   BMI 34.72 kg/m      General:  Pleasant and in no acute distress   Eyes:  EOMI  ENT:  Airway 1+  Moist mucous membranes  Neck:  Supple, No LAD, No thyromegaly, No carotid bruits appreciated  Respiratory: Normal respiratory effort, no cough, wheezes or crackles  CV:  Regular rate and Rhythm,no murmurs, pulses 2+, no calf tenderness, no LE edema  Gastrointestinal: Abdomen NT/ND, BS+  Musculoskeletal: muscle mass WNL  Skin: color brown hair full, incisions nicely healed  Neurological: No tremor, cane assisted gait  Psychiatric: alert and " oriented X3, mood and affect normal    Counseling     We reviewed the important post op bariatric recommendations:  -eating 3 meals daily  -eating protein first, getting >60gm protein daily  -eating slowly, chewing food well  -avoiding/limiting calorie containing beverages  -drinking water 15-30 minutes before or after meals  -choosing wheat, not white with breads, crackers, pastas, micheal, bagels, tortillas, rice  -limiting restaurant or cafeteria eating to twice a week or less    We discussed the importance of restorative sleep and stress management in maintaining a healthy weight.  We discussed the National Weight Control Registry healthy weight maintenance strategies and ways to optimize metabolism.  We discussed the importance of physical activity including cardiovascular and strength training in maintaining a healthier weight.    We discussed the importance of life-long vitamin supplementation and life-long  follow-up.    Yuliana was reminded that, to avoid marginal ulcers she should avoid tobacco at all, alcohol in excess, caffeine in excess, and NSAIDS (unless indicated for cardioprotection or othewise and opposed by a PPI).    Dorothy Coronado MD, MD, [facility].  11/14/2024  10:03 AM      No images are attached to the encounter.  Total time spent on the date of this encounter doing: chart review, review of test results, patient visit, physical exam, education, counseling, developing plan of care, and documenting = 30 minutes.      Again, thank you for allowing me to participate in the care of your patient.        Sincerely,        Dorothy Coronado MD

## 2024-11-14 NOTE — PATIENT INSTRUCTIONS
How to file a complaint with the .    [email or web address]://[email or web address].mn.us/Consumer/Publications/AssertYourRights.asp    Selwyn Miller Office of the   [phone] in the [facility]  [phone] Outside the [facility]      To write a letter:   [address] [place] [address]. [address]  [place] [address]     [email or web address]-help/     [employer].com    [place] s [facility] and [place](0.55 miles away)  [address] [place], [place] [address] [place] [address]  Everyone is welcome. All participants will receive a variety of fresh fruits and vegetables. No pre-registration is required for any of the event dates. These events do NOT count as a food shelf visit and will not affect your ability to get food at the food shelf this month.    Dates: June 14, July 26, August 9, September 13, October 11    10:00 a.m. - 12:00 p.m. Once a month on select Friday's  Visit [place] s Free PolicyBazaar - BitPoster and [place] Website    Get Directions to Corrigan s Black Tie [employer]ureser Ctrax and [place]  Categories: Community food distributions, [place] Markets, Food Shelves  ViFlux on [facility] (Door 3)(1.42 miles away)  [address] [facility] [address], [place] [address]  A[place]ova on [facility] is located in [facility] and is open to all [place] residents and [place] residents who live in [place] s service area. Please call [phone] for more details and no appointment is necessary.    Drive through only Hours: Monday s 3-5pm and Tuesdays 10-12pm    In Person Shopping Only Hours: Wednesday s 10-Noon and Thursday s 10-Noon    Mondays 3pm-5pm, Tuesday through Thursday 10am-12pm  [phone]    Visit ViFlux on [facility] (Door 3) Website    Get Directions to ViFlux [facility] (Door 3)  Categories: Food Shelves  Fruit of the Vine Food Shelf- [place](1.69 miles away)  [address]  Client choice shopping or curbside pickup. No appointment needed. Only send one member of household in the building and encouraged to wear mask.    Check Flexion for the latest update: https://www.Fab.com/FOTVSPMN    Tuesdays 3pm- 6pm, Friday 11am to 1pm (crisis appointment orders), Saturdays 9am to noon  [phone]    Visit Fruit of the Vin Food [facility] Website    Get Directions to Fruit [facility]  Categories: Food Shelves  [facility](1.7 miles away)  [address] [place] [address]  Serves East and [place] and [place] surrounding suburbs.    Tuesday and Thursday 9:00am to 11am and 1:00pm to 3:00pm and Friday 10:00am to 11:00am (Food Rescue Distribution )  [phone]    Visit [facility] Website    Get Directions to [facility]  Categories: Food Shelves  [facility](2.3 miles away)  [address] [place] [address]  All are welcome regardless of zip code.    Distribution of shelf-stable bag lunches Mondays and Thursdays 11:30 am -12:30 pm    Community Meals Sundays from 5:00-7:00 pm. This is usually a sit down dinner but we are now offering hot food in to-go containers because of COVIS-19.    Food Shelf The 2nd and 4th Saturdays of each month from 10:00-11:30 am Also on an emergency basis    2nd and 4th Saturdays of the month 10-11:30 am  [phone]    Visit [facility] Website    Get Directions to [facility]  Categories: Food Shelves  [facility] (CLUES)(2.41 miles away)  [address] [place] [address], [facility]  Mondays, 3-5 pm  [phone]    Visit [facility] Serv[facility] (CLUES) Website    Get Directions to [facility] (CLUES)  Categories: Food Shelves  Foodshelf-In-A-Box @ [place] [employer]up Mobil(2.53 miles away)  [address], [place] [address], [place]  Primary  Contact: Issa Frey; [phone]; [email or web address]    Secondary contact: Freda Hinojosa; [email or web address]    3rd Mon. of month from 3:00-5:00 pm  Visit Foodshelf-In-A-Box @ Shopalytic [employer]illing [employer] Mobil Website    Get Directions to Foodshelf-In-A-Box @ [place] MENA PRESTIGE PopSupercircuitsup Mobil  Categories: Food Shelves  Food For Thought at [facility](2.55 miles away)  [address] [address], [facility]  Primarily serves students of [facility]         Monday-Friday, 10AM-4PM  [phone]    Visit Food For Thought at [facility] Website    Get Directions to Food For Thought at [facility]  Categories: Food Shelves  Loaves & Fishes at Our [facility](2.64 miles away)  [address] [place] [address]  Grab-and-go meals    Wednesdays: 5:30-6:30pm     Visit Loaves & Fishes at Our [facility] Website    Get Directions to Loaves & Fishes at [facility]  Categories: Free Meals  [facility](2.65 miles away)  [address] [place] [address]  The Nutrition Assistance Program for Seniors (NAPS) provides a box of nutritious food each month to eligible low-income seniors, 60 years and over.    Call for more information  [phone]    Get Directions to [facility]  Categories: Senior Resources  [facility](2.81 miles away)  [address] [place] [address]  Call to set up delivery    [phone]    Get Directions to [facility]  Categories: Senior Resources  Community Engagement, [facility](2.81 miles away)  [address]  Pop up food shelf  [phone]    Get Directions to Community Engagement, [facility]  Categories: Food Shelves  [facility](2.97 miles away)  [address], [place] [address], [place]  Call to set up  delivery    [phone]    Get Directions to [facility]  Categories: Senior Resources  Department  [facility](3.04 miles away)  [address] [place], [place] [address] [place] [address]  A food shelf that serves the  population in [facility]    We are accepting walk - ins only during our normal business hours    Our delivery service is temporarily suspended    Department  Czech Work Food [facility] is a Certified SuperShelf which means they excel at providing a welcoming environment for communities to access appealing, healthy food. Learn more         Monday 1:00pm-6:00pm, Tuesday-Thursday 9:30am-2:30pm  [phone]    Visit Department  Czech Work Food Shelf Website    Get Directions to Department  Czech Work Food Shelf  Categories: Food Shelves  [facility](3.04 miles away)  [address], [place], [place], [place]  Free meals offered daily including holidays:    Breakfast: 9:00am - 9:45am  Lunch: 11:45am - 12:45pm  Dinner: 4:30pm - 5:15pm    Breakfast: 9:00am - 9:45am, Lunch: 11:45am - 12:45pm, Dinner: 4:30pm - 5:15pm  Get Directions to [facility]  Categories: Free Meals  Nova [employer](3.15 miles away)  [address] [place] [address]  Call for an appointment or fill out online order form: [email or web address]    Clients not currently allowed inside of the building.    Some delivery available, priority given to seniors.    Monday-Friday 9am-5pm  [phone]    Visit [facility] Food Shelf Website    Get Directions to [facility] Food Shelf  Categories: Food Shelves  [facility] Food Shelf(3.3 miles away)  [address] [place] [address]  The Food Shelf can be found in the basement. To schedule an appointment at our Food Shelf, please register here    Wed: 5PM -  7PM  [phone]    Visit [facility] Food Shelf Website    Get Directions to [facility] Food Shelf  Categories: Food Shelves  [address] Food Shelf(3.6 miles away)  [address] [place] [address]  Food Shelf: Mondays and Wednesday 10AM - 12PM and 1PM-3PM  Food Fair Fresh Food Distribution: Fridays at 11 AM - 2PM  Please call [phone] for questions regarding any other  assistance.  Food Shelf: Mondays and Wednesday 10AM - 12PM and 1PM-3PM, and Fridays at 11 AM - 2PM (Food Fair Fresh Food)  [phone]    Visit [address] Food Shelf Website    Get Directions to [address] Food Shelf  Categories: Food Shelves  DOR[facility]y Food Shelf(3.65 miles away)  Worship [employer] [address] [place] [id number]  Serves anyone.    No appointment is necessary. Clients are able to receive food assistance monthly on the designated days. New clients must bring a  s license or other ID and request to fill out an application.    3rd Saturday of each month from 12PM-1.00PM.  [phone]    Visit [facility] Food Shelf Website    Get Directions to [facility] [place] [facility] Food Shelf  Categories: Food Shelves  The Corner Shelf(3.68 miles away)  [address] [place] [address]  Due to high demand, The Corner Shelf may close early. We make every effort to not close early. Thank you for your understanding and we apologize for any inconvenience.    The Corner Shelf, [facility] food shelf, is a curbside food shelf that provides weekly distribution of fresh fruits, vegetables, meat and kitchen staples to the [place] area.    You can print and fill out the food order form available at [email or web address]-[email or web address]-corner-[email or web address]/ to save some time during your next visit. No appointment is necessary.    Please contact [email or web address] with any questions    Wednesdays and Thursdays from  10:00 am-11:30am  [phone]    Visit [facility] Corner [facility] Website    Get Directions to [facility]  Categories: Food Shelves    Food For Thought at [facility] (Food Resource Center)(3.73 miles away)  [address] [place] [address]  Located in [address] Undergraduate/, Faculty, and Staff              Monday 10AM - 6PM, Tuesday 11AM - 9PM, Wednesday 11AM - 6PM, Thursday 9AM - 6PM, Friday 9AM - 5PM, Saturday 10AM - 12:30PM, Sunday 10AM - 1PM  [phone]    Visit Food For Thought at [facility] ([address] Resource [employer]) Website    Get Directions to Food For Thought at [facility] ([employer])  Categories: Food Shelves  Opt[employer] Meals(3.88 miles away)  [address] [place] [address], [facility]  COVID-19 Updates: Home Delivered Meals, Frozen or Hot, delivered to client s door. Serving the [address] [place] [address] Area, accepting Waiver clients, and Title 3 for [place], [place], [place], and [facility]. Registration required. Call [phone] to register.    Monday - Friday 8:00AM - 4:00PM  [phone]    Visit [employer] Meals Website    Get Directions to [employer] Meals  Categories: Senior Resources  [place] [facility](3.94 miles away)  [address]  Everyone is welcome. All participants will receive a variety of fresh fruits and vegetables. No pre-registration is required for any of the event dates. These events do NOT count as a food shelf visit and will not affect your ability to get food at the food shelf this month.    Dates: June 7, July 12, August 2, September 6, October 4    10:00 a.m. - 12:00 p.m. Once a month on select Friday's  Visit [place] [facility] Website    Get Directions to [facility] [facility]  Categories: Community food distributions, [employer] Markets, Food Shelves  Comet s Cupboard ([facility] [address] [facility]  Shelf)(3.94 miles away)  [address] [place] [address]  A food shelf that serves Success students, staff, faculty, and alumni.    Appointment system: [email or web address]    Open to community members by appointment    Tues 9-11, Wed 2-4, Thurs 2-4  [phone]    Visit [facility] s Cupboard ([facility] Food Shelf) Website    Get Directions to [facility] [employer] Mobility ([facility] Food Shelf)  Categories: Food Shelves  Foodshelf-In-A-Box at [facility] Pop-up Mobile(4.15 miles away)  [address] [place] [address], [facility]  Primary Contact: Maria Eugenia Morillo; [email or web address]    1st Monday and 1st Wednesday of month from 3:00-5:00 pm  Visit Foodshelf-In-A-Box at [facility] PurePlay Website    Get Directions to Foodshelf-In-A-Box at [facility] Pop-up Mobile  Categories: Food Shelves  Loaves & Fishes at [facility](4.18 miles away)  [address] [place] [address]  Service Location: Hybrid meal service with to-go and in-person dining options. To-go meals served in the SE parking lot off [facility], near door #5.    Special Notice: No meal service on Fridays during Lent: 2/16, 2/23, 3/1, 3/8, 3/15, 3/22, and Good Friday (3/29/24).    Monday - Friday, 5:00 - 6:00 PM     Visit Loaves & Fishes at [facility] Website    Get Directions to Loaves & Fishes at [facility]  Categories: Free Meals  [address](4.2 miles away)  [address] [place] [address]  Call to set up delivery    [phone]    Get Directions to [address]  Categories: Senior Resources  Altavista s Free Farmer s Market - [facility] Field(4.26 miles away)  [address] [place] [address], [facility]  Everyone is welcome. All participants will receive a variety of fresh fruits and vegetables. No pre-registration is required for any of the event dates. These events do NOT count as a food shelf visit and will  not affect your ability to get food at the food shelf this month.    Dates: June 21, July 19, August 23, September 20    10:00 a.m. - 12:00 p.m. Once a month on select Friday's  Visit [place] [employer]  Lizbethhetras [facility] Website    Get Directions to [place] s Next One's On Me (NOOM)  [facility]  Categories: Community food distributions, Royal Markets, Food Shelves  [facility](4.27 miles away)  [address] [place] [address]  Call [place] Meals on Wheels to sign up: [phone]  or locally if need help: [phone]    Meal on Wheels delivered M, W, F.  [phone]    Get Directions to [facility]  Categories: Senior Resources  [facility] House at the [facility](4.29 miles away)  [address] [place] [address]  To shop in-person you need to schedule an appointment ahead of time. Here are some important things to know:    You are welcome to schedule up-to two in-person shopping appointments, per month, for your yourself and your household at the [facility]  Appointments are required to shop in-person. To schedule an appointment, you can call our Service Connect line: [phone] or visit our website to schedule an appointment online: [email or web address]  Appointments are limited and will be scheduled on a first-come, first-served basis  Additionally, you are welcome to one set of walk-up bags per month, for you and your household. A set of walk-up bags consists of two pre-packaged grocery bags with staple food items.  Mondays 1-4PM & 5-6:30PM Tuesday -Friday 9AM-11:30PM & 1-3:30PM 2nd Saturday of each month 9:30AM-Noon  [phone]    Visit [facility] House at the [facility] Website    Get Directions to [facility]  Categories: Food Shelves  [facility](4.32 miles away)  [address], [place] [address],  [place]  Guests should come to the door nearest the northeast corner of the building to receive service. Please see our calendar on the  Calendar & Events  page of our website for food shelf dates as they vary.    All guests are allowed to do their own grocery shopping, assistance is available if needed. Due to COVID-19 restrictions one guest at a time will be allowed in the food shelf room. Guests are allowed one visit per calendar month and must complete an eligibility form once per calendar year. We do not offer delivery service.    Tuesdays 12-2pm  [phone]    Visit [facility] [place] Website    Get Directions to [facility] [place]  Categories: Food Shelves  [facility] [place].(4.32 miles away)  [address], [place], [place], [place]  Monday: A hot meal is served each Monday with additional produce and bakery items available to select from.    Wednesday: A bagged lunch is served each Wednesday with additional produce and bakery items available to select from.    NOTE: Dates are subject to cancellation without notice. Please check our calendar for holiday closures.    Monday and Wednesday 12:00pm-2:00pm (Take-Out Only)  [phone]    Visit [facility] [place]. Website    Get Directions to [facility] [place].  Categories: Free Meals  [facility](4.59 miles away)  [address] [place] [address], [facility]  Call to set up delivery    Meals are delivered between 11:00am - 12:00pm, Monday - Friday.  [phone]    Visit [facility] Website    Get Directions to [facility]  Categories: Senior Resources  [facility] Food Shelf(4.62 miles away)  [address], [place] [address], [facility]  Important update: There is a limit to how many households we can serve each service shift. It s best to arrive on time and be prepared to wait your turn as arriving at the end of the service may mean you are unable to  shop. You can arrive early but the doors won t be open until the beginning of the service shift.    Mon, Tues, Thurs, Fri 10am-12pm and 2-4pm and Wed. 4-7pm  [phone]    Visit [facility] Food Shelf Website    Get Directions to [facility] Food Shelf  Categories: Food Shelves  [facility](4.9 miles away)  [address], [place], [place], [facility]  COVID-19 Update:    Client Choice, Appointments, Table of food outside M-F (Free for All), Monthly bag of food (Changes Monthly). No appointments on Wednesday, just walk ins.    Mon-Fri 10:30-1pm  [phone]    Visit [facility] Food [facility] Website    Get Directions to [facility]  Categories: Food Shelves  [facility](4.98 miles away)  [address] [place] [address]  Located in [facility]. Serves [address], [address], [address], [address] and [address].    Please call [phone] to schedule an appointment. Will take walk-ins as well.    Monday, Tuesday and Thursday 9:30 a.m. - 2:30 p.m.  [phone]    Visit [facility] Website    Get Directions to [facility]  Categories: Food Shelves

## 2025-01-04 DIAGNOSIS — R21 RASH: ICD-10-CM

## 2025-01-06 ENCOUNTER — OFFICE VISIT (OUTPATIENT)
Dept: GASTROENTEROLOGY | Facility: CLINIC | Age: 65
End: 2025-01-06
Payer: COMMERCIAL

## 2025-01-06 VITALS
HEART RATE: 87 BPM | HEIGHT: 63 IN | DIASTOLIC BLOOD PRESSURE: 87 MMHG | SYSTOLIC BLOOD PRESSURE: 153 MMHG | WEIGHT: 199 LBS | BODY MASS INDEX: 35.26 KG/M2 | OXYGEN SATURATION: 95 %

## 2025-01-06 DIAGNOSIS — K22.4 JACKHAMMER ESOPHAGUS: ICD-10-CM

## 2025-01-06 DIAGNOSIS — R13.19 ESOPHAGEAL DYSPHAGIA: Primary | ICD-10-CM

## 2025-01-06 DIAGNOSIS — K22.4 ESOPHAGEAL DYSKINESIA: ICD-10-CM

## 2025-01-06 PROCEDURE — 99215 OFFICE O/P EST HI 40 MIN: CPT | Performed by: INTERNAL MEDICINE

## 2025-01-06 RX ORDER — CLOTRIMAZOLE 1 %
CREAM (GRAM) TOPICAL 2 TIMES DAILY
Qty: 60 G | Refills: 11 | Status: SHIPPED | OUTPATIENT
Start: 2025-01-06

## 2025-01-06 RX ORDER — OMEPRAZOLE 40 MG/1
40 CAPSULE, DELAYED RELEASE ORAL DAILY
Qty: 90 CAPSULE | Refills: 3 | Status: SHIPPED | OUTPATIENT
Start: 2025-01-06

## 2025-01-06 ASSESSMENT — PAIN SCALES - GENERAL: PAINLEVEL_OUTOF10: WORST PAIN (10)

## 2025-01-06 NOTE — PROGRESS NOTES
Gastroenterology Visit for: Yuliana Armendariz 1960   MRN: [id number]     Reason for Visit:  chief complaint    Referred by: Syed  / [address] [place] [address] / [facility] [id number]  Patient Care Team:  Luan Mccray MD as PCP - General  Parmnider Lynch DO as MD (Gastroenterology)  Parminder Lynch DO as Assigned Gastroenterology Provider    History of Present Illness:   Yuliana Armendariz is a 64 year old female with asthma/COPD, GIORGIO with CPAP, HTN, HLD, CHF (No CVA or MI), seizure disorder (on keppra almost 10 years, bariatric surgery (onesimo-en-y 4/23/2014), dysphonia who is presenting as a follow up patient in consultation at the request of Dr. Nino ([facility]) with a chief complaint of dysphagia and hypercontractile esophagus.    1/6/25  Had back surgery anterior approach and posterior approach. She had several follow ups with infection and admissions. Had been in the hospital for over a month due to infection and wound vac. Had syncopal episode during admission.  While admitted had bladder infection and now has a stent (ureteral). Walking with cane. Sleeping with a lot of pillows. History of ganglion cyst, knee surgery.    Continues to have swallowing issues. Typical lunch can be any of the following: low sodium soup. Half of a sandwich. Can eat bread. Yogurt. Salad. Fruits, bananas. Deli meats.     Foods that get caught when swallowing:  Pasta, rice, slice of bread, Lobster/shrimp, beans    Reports incomplete controlled acid reflux on omeprazole 20mg daily requiring Tums in addition.  -----------------------------------------------------------------------    9/11/23  Spinal stenosis new diagnosis. She has been getting physical therapy. Recently had two rotator cuff repairs. She believes she may require additional procedures.     States she still has a problem with food getting caught. This is occurring daily. Not as bad as it was when we first met. She is starting to get acid reflux and is on that daily.  "After food gets stuck she will drink water and it will slowly go down. Occasionally she will feel it sit in the esophagus. Does not eat large portions because of gastric bypass. Eats minimal bread. Avoids food with skin. Eats a lot of chicken and fish. +dietary changes: chewing thoroughly.     Patient states she has an upcoming colonoscopy scheduled at [facility].     See updated BEDQ and Eckardt score below: These patient reported outcomes are pertinent to the HPI and have personally been reviewed.  ----------------------------------------------  Interval History 3/6/2023:   The patient states that she has been doing well. Every so often she has difficulty. 1-2 times a week or month she may have the sensation that food gets caught. Continues to take omeprazole. The patient had regurgitation this morning. Botox appears to have helped and the improvement has been sustained. The patient states that her daughter has achalasia and is concerned about if she has achalasia or not. Patient states that she has early satiety. She denies weight loss due to dysphagia.       See updated BEDQ and eckardt score.   ---------------------------------------------------------------  3/29/2022 Interval History:  Yuliana SATHISH Armendariz states her main symptoms are chest pain and regurgitation along with dysphagia.    Not currently taking omeprazole since taking diltiazem. Patient states that she has heartburn which is worse now that she is off of her PPI. The patient has a history of bariatric surgery. Her symptoms started with non-cardiac chest pain and regurgitation. She feels that food will get stuck and then regurgitate after 30-40 minutes. She can sip room temperature water to help with food that gets stuck. Cold water causes more discomfort. She occasionally drinks ginger ale to see if that helps \"break up acid\". She chews food very thoroughly. Barely eats bread because of symptoms. She is scared to eat foods because of it getting caught. " "Sweet potatoes and baked chicken.     Her symptoms have been ongoing for \"years\" but now it is worse. Worsening over the past year.     Has had prior endoscopy with balloon dilation to 19mm for a non-obstructing schatzki ring. This did not improve her symptoms. She has been on diltiazem and peppermint without relief.     Daughter diagnosed with achalasia and underwent surgery. She was diagnosed when she was in high school. Father of her daughter has dysphagia as well. Patient does not know of anyone on her side of the family    Patient feels that she has lost approximately 35 pounds however her objective weight is 225lbs.  ---------------------------------------------------------------     Yuliana Armendariz denies  odynophagia,  nausea, vomiting, early satiety, abdominal pain, abdominal distension/bloating, diarrhea, constipation, hematochezia, or melena. No unintentional weight loss.     Wt Readings from Last 5 Encounters:   01/06/25 90.3 kg (199 lb)   11/14/24 88.9 kg (196 lb)   12/06/23 92.5 kg (204 lb)   09/11/23 98.6 kg (217 lb 6.4 oz)   06/06/23 98.7 kg (217 lb 9.6 oz)        Esophageal Questionnaire(s)    BEDQ Questionnaire      2/17/2022    10:09 AM 3/29/2022     7:00 AM 3/6/2023     1:24 PM 9/11/2023     1:00 PM   BEDQ Questionnaire: How Often Have You Had the Following?   Trouble eating solid food (meat, bread, vegetables) 5 4 2  5   Trouble eating soft foods (yogurt, jello, pudding) 0 2 0  0   Trouble swallowing liquids 0 0 0  0   Pain while swallowing 2 4 1  0   Coughing or choking while swallowing foods or liquids 0 5 0  0   Total Score: 7 15 3 5       Proxy-reported         2/17/2022    10:09 AM 3/29/2022     7:00 AM 3/6/2023     1:24 PM 9/11/2023     1:00 PM   BEDQ Questionnaire: Discomfort/Pain Ratings   Eating solid food (meat, bread, vegetables) 5 3 2  3   Eating soft foods (yogurt, jello, pudding) 0 3 0  0   Drinking liquid 0 0 0  0   Total Score: 5 6 2 3       Proxy-reported       Eckardt " Questionnaire      2/17/2022    10:09 AM 3/29/2022     7:00 AM 3/6/2023     1:25 PM 9/11/2023     1:00 PM   Eckardt Questionnaire   Dysphagia 3 3 1 2   Regurgitation 1 1 1  0   Retrosternal Pain 2 1 0  1   Weight Loss (kg) 0 0 0 0   Total Score:  6 5 2 3       Proxy-reported       Promis 10 Questionnaire      2/17/2022    10:09 AM 3/29/2022     7:00 AM 3/6/2023     1:28 PM   PROMIS 10 FLOWSHEET DATA   In general, would you say your health is: 2 2 2    In general, would you say your quality of life is: 3 3 2    In general, how would you rate your physical health? 3 2 2    In general, how would you rate your mental health, including your mood and your ability to think? 5 5 2    In general, how would you rate your satisfaction with your social activities and relationships? 5 5 2    In general, please rate how well you carry out your usual social activities and roles. (This includes activities at home, at work and in your community, and responsibilities as a parent, child, spouse, employee, friend, etc.) 5 5 2    To what extent are you able to carry out your everyday physical activities such as walking, climbing stairs, carrying groceries, or moving a chair? 3 3 4    In the past 7 days, how often have you been bothered by emotional problems such as feeling anxious, depressed, or irritable? 3 2 5    In the past 7 days, how would you rate your fatigue on average? 4 3 4    In the past 7 days, how would you rate your pain on average, where 0 means no pain, and 10 means worst imaginable pain? 8 8 10    Mental health question re-calculation - no clinical value 3 4 1   Physical health question re-calculation - no clinical value 2 3 2   Pain question re-calculation - no clinical value 2 2 1   Global Mental Health Score 16 17 7   Global Physical Health Score 10 10 9   PROMIS TOTAL - SUBSCORES 26 27 16       Proxy-reported     STUDIES & PROCEDURES:    EGD:     4/26/2022   Findings:        The examined esophagus was normal.         The Z-line was regular and was found 36 cm from the incisors. With the        patient in the left lateral decubitus position, a 16 cm long 3 mm        diameter functional lumen imaging probe (FLIP), with a volume-based        barostat bag, was placed at the lower esophageal sphincter without        endoscopic visualization for the diagnostic evaluation of dysphagia.        Saline was infused into the bag to a volume of 30 mL. Additional saline        was infused to a volume of 40 mL and Additional saline was infused to a        volume of 50 mL. FLIP Topography was performed using stepwise        distensions and demonstrated repetitive antegrade contractions (RAC).        The catheter was deflated and withdrawn. SEE OP NOTE IN EPIC FOR FULL        DATA TABLE. Area was successfully injected with 100 units botulinum        toxin. All contractions were hypercontractile. Did not fill beyond 50ml        due to the significant contractile force.        Abnormal motility was noted in the esophagus. There is spasticity of the        esophageal body. Suggestive of hypercontractile esophagus.        One non-bleeding superficial gastric-jejunal anastomotic ulcer with no        stigmata of bleeding was found at the anastomosis. The lesion was 4 mm        in largest dimension. Biopsies were taken with a cold forceps for        histology. Verification of patient identification for the specimen was        done. Estimated blood loss: none.        The entire examined stomach was normal. Biopsies were taken with a cold        forceps for Helicobacter pylori testing. Verification of patient        identification for the specimen was done. Estimated blood loss: none.        The examined jejunum was normal.                                                                                     Impression:            - Normal esophagus.                          - Z-line regular, 36 cm from the incisors. Injected                          with  botulinum toxin.                          - Abnormal esophageal motility, established esophageal                          spasm.                          - Non-bleeding gastric ulcer with no stigmata of                          bleeding. Biopsied.                          - Normal stomach. Biopsied.                          - Normal examined jejunum.                          - FLIP imaging performed, consistent with spastic                          esophageal motility disorder.     Final Diagnosis   A. STOMACH, BIOPSY AT ANASTOMOSIS:  Jejunal mucosa with small ulceration, neutrophils and other changes suggestive of peptic jejunitis; no dysplasia or malignancy      B. STOMACH, BIOPSY:  Gastric body/fundic-type mucosa with PPI-like changes; no significant inflammation; negative for H. pylori, intestinal metaplasia, or dysplasia          Colonoscopy:  Date:  Impression:  Pathology Report:     EndoFLIP directed at the UES or LES (8cm (EF-325) balloon length or 16cm (EF-322) balloon length):   Date:  8cm balloon  Balloon inflation Balloon pressure CSA (mm^2) DI (mm^2/mmHg) Dmin (mm) Compliance   20 (ladmark ID)        30        40        50           EndoFLIP directed at the LES (16cm (EF-322) balloon length):   Date: 4/26/22     16cm balloon  Balloon inflation Balloon pressure CSA (mm^2) DI (mm^2/mmHg) Dmin (mm) Compliance   30 (ladmark ID) 36.1   9.78 21.2     40 66.2   8.58 26.9     50 77   8.84 29.4     60             70                           Hypercontractile RACs     High Resolution Manometry:  Date: 2/17/22  Impression:   The baseline tone of the lower esophageal sphincter was normotensive. The lower esophageal sphincter was not able to relax appropriately as measured by the IRP (17.0). The EGJ morphology was type 1. There was some increased pressurization below the LES which may be due to the catheter coiling either at the LES or in the gastric pouch post onesimo-en-y bypass. There was peristalsis visible. The  DCI, DL, and contractile pattern were not within normal limits. 8/10 swallows were hypercontractile. There were 3/10 swallows with elevated intrabolus pressure and compartmentalized pressurization. Rapid water swallows were performed with less than normal augmentation. Rapid Drink Challenge does not indicate an elevated IRP. Supine liquid swallows were performed. Upright liquid swallows were performed with a median upright IRP of 8.9. All 5/5 swallows were hypercontractile and all 5 had intrabolus pressurization. Bolus transit as measured by impedance was complete in all 10/10 swallows. This is most consistent with hypercontractile esophagus.       Wording of procedure description and interpretation was adapted from [facility] [employer] Weekly Motility Conference (2018).       Impressions   Based on the most recent [place] Classification v4.0, the findings are most consistent with hypercontractile esophagus. There is some intrabolus pressurization noted and an elevated supine IRP however this does not meet criteria for EGJ outflow obstruction based on the improved IRP seen on upright swallows. An endoscopy with endoFLIP may be warranted to maximally evaluate the EGJ prior to any possible non-medical intervention.     *Note, this study was performed while the patient was taking diltiazem.     Dr Parminder Lynch     PH/Impedance:  Date:  Impression:     Bravo:  48 or 96hr  Date:  Impression:    CT:  Date:  Impression:    Esophagram:  Date:  Impression:     Prior medical records were reviewed including, but not limited to, notes from referring providers, lab work, radiographic tests, and other diagnostic tests. Pertinent results were summarized above.     History     Past Medical History:   Diagnosis Date    Asthma     Back pain     Congestive heart failure (H)     COPD (chronic obstructive pulmonary disease) (H)     Degenerative disc disease     Diabetes type 2, controlled (H)     Dyslipidemia      Esophageal dyskinesia     GERD (gastroesophageal reflux disease)     Gout     Hepatic steatosis     nodular appearance suggesting fibrosis/cirrhosis    Hyperparathyroidism (H)     Hyperparathyroidism, secondary (H)     Hypertension     Low bone mass     Metabolic syndrome     Morbid obesity (H)     Osteoarthritis     Reflux        Past Surgical History:   Procedure Laterality Date    ARTHROSCOPY KNEE WITH MENISCAL REPAIR Left 01/2019    CHOLECYSTECTOMY      ESOPHAGEAL BALLOON PROVOCATION STUDY N/A 4/26/2022    Procedure: ESOPHAGEAL BALLOON PROVOCATION STUDY;  Surgeon: Parminder Lynch DO;  Location: [facility]    ESOPHAGOGASTRODUODENOSCOPY, WITH BOTULINUM TOXIN INJECTION  4/26/2022    Procedure: Esophagogastroduodenoscopy, With Botulinum Toxin Injection;  Surgeon: Parminder Lynch DO;  Location: [facility]    ESOPHAGOSCOPY, GASTROSCOPY, DUODENOSCOPY (EGD), COMBINED N/A 4/26/2022    Procedure: ESOPHAGOGASTRODUODENOSCOPY, WITH BIOPSY;  Surgeon: Parminder Lynch DO;  Location: [facility]    HYSTERECTOMY      IR LUMBAR PUNCTURE  11/17/2023    REVISION RIAN-EN-Y  4/23/2014    [facility]    TOTAL KNEE ARTHROPLASTY Left 03/2019    TUBAL LIGATION         Social History     Socioeconomic History    Marital status: Single     Spouse name: Not on file    Number of children: Not on file    Years of education: Not on file    Highest education level: Not on file   Occupational History    Not on file   Tobacco Use    Smoking status: Former    Smokeless tobacco: Never    Tobacco comments:     quit over 25 years ago   Substance and Sexual Activity    Alcohol use: Yes     Comment: Alcoholic Drinks/day: Rarely    Drug use: No    Sexual activity: Not Currently     Partners: Male     Birth control/protection: Surgical   Other Topics Concern    Not on file   Social History Narrative    Single. Lives with her dog.  And her fiance (her children's father) on and off     Social Drivers of Health     Financial Resource Strain: Not on file   Food Insecurity: No Food  Insecurity (5/16/2024)    Received from [facility]    Hunger Vital Sign     Worried About Running Out of Food in the Last Year: Never true     Ran Out of Food in the Last Year: Never true   Transportation Needs: Not on file   Physical Activity: Not on file   Stress: Not on file   Social Connections: Not on file   Interpersonal Safety: Unknown (5/28/2024)    Received from [facility]    Humiliation, Afraid, Rape, and Kick questionnaire     Fear of Current or Ex-Partner: Not on file     Emotionally Abused: Not on file     Physically Abused: No     Sexually Abused: No   Housing Stability: Not on file       Family History   Problem Relation Age of Onset    Diabetes Mother     Heart Disease Mother     Hyperlipidemia Mother     Hypertension Mother      Family history reviewed and edited as appropriate    Medications and Allergies:     Outpatient Encounter Medications as of 1/6/2025   Medication Sig Dispense Refill    acetaminophen (TYLENOL) 500 MG tablet Take 500-1,000 mg by mouth as needed      ADVAIR -21 mcg/actuation inhaler [ADVAIR -21 MCG/ACTUATION INHALER] Inhale 1 puff 2 (two) times a day.  11    albuterol (PROVENTIL HFA;VENTOLIN HFA) 90 mcg/actuation inhaler [ALBUTEROL (PROVENTIL HFA;VENTOLIN HFA) 90 MCG/ACTUATION INHALER] Inhale 2 puffs every 6 (six) hours as needed for wheezing.      albuterol (PROVENTIL) 2.5 mg /3 mL (0.083 %) nebulizer solution [ALBUTEROL (PROVENTIL) 2.5 MG /3 ML (0.083 %) NEBULIZER SOLUTION] Take 2.5 mg by nebulization every 6 (six) hours as needed for wheezing.      amLODIPine (NORVASC) 5 MG tablet [AMLODIPINE (NORVASC) 5 MG TABLET] Take 2.5 mg by mouth daily.       atorvastatin (LIPITOR) 20 MG tablet [ATORVASTATIN (LIPITOR) 20 MG TABLET] Take 20 mg by mouth bedtime.      CALCIUM CITRATE/VITAMIN D3 (CITRACAL + D PETITES ORAL) [CALCIUM CITRATE/VITAMIN D3 (CITRACAL + D PETITES ORAL)] Take 2 tablets by mouth 2 (two) times a day.       carvedilol (COREG) 3.125 MG tablet  [CARVEDILOL (COREG) 3.125 MG TABLET] Take 3.125 mg by mouth bedtime.       cetirizine (ZYRTEC) 10 MG tablet Take 10 mg by mouth daily.      cholecalciferol (VITAMIN D3) 125 mcg (5000 units) capsule Take 125 mcg by mouth daily      clotrimazole (LOTRIMIN) 1 % external cream APPLY TOPICALLY TO THE AFFECTED AREA TWICE DAILY 60 g 11    Colloidal Oatmeal 1 % CREA Apply topically.      CONTOUR NEXT TEST STRIPS strips [CONTOUR NEXT TEST STRIPS STRIPS] daily. TEST ONCE DAILY      cyanocobalamin, vitamin B-12, 1,000 mcg Subl [CYANOCOBALAMIN, VITAMIN B-12, 1,000 MCG SUBL] Place 1 tablet (1,000 mcg total) under the tongue daily. 90 tablet prn    diltiazem (CARDIZEM) 60 MG tablet Take 1 tablet by mouth 4 times daily.      fluocinonide (LIDEX) 0.05 % cream [FLUOCINONIDE (LIDEX) 0.05 % CREAM] Apply 1 application topically 2 (two) times a day.      gabapentin (NEURONTIN) 100 MG capsule Take 100 mg by mouth once. Unsure of does      gabapentin (NEURONTIN) 300 MG capsule       hydrocortisone 2.5 % cream [HYDROCORTISONE 2.5 % CREAM] Apply 1 application topically as needed.      levETIRAcetam (KEPPRA) 1000 MG tablet Take 1 tablet by mouth 2 times daily.  3    losartan (COZAAR) 100 MG tablet       methocarbamol (ROBAXIN) 500 MG tablet Take 500 mg by mouth 3 times daily.      miconazole (MICATIN) 2 % cream Place vaginally at bedtime.      Microlet Lancets MISC daily.      montelukast (SINGULAIR) 10 mg tablet [MONTELUKAST (SINGULAIR) 10 MG TABLET] Take 10 mg by mouth bedtime.      nitroFURantoin macrocrystal-monohydrate (MACROBID) 100 MG capsule Take 100 mg by mouth 2 times daily.      Nutritional Supplements (JACQUELIN) POWD       omeprazole (PRILOSEC) 20 MG capsule Take 20 mg by mouth daily      omeprazole (PRILOSEC) 40 MG DR capsule Take 1 capsule (40 mg) by mouth daily. 90 capsule 3    oxyBUTYnin (DITROPAN) 5 MG tablet Take 5 mg by mouth.      pediatric multivit-iron-min (CEROVITE JR) Chew [PEDIATRIC MULTIVIT-IRON-MIN (CEROVITE JR) CHEW]  "Chew 1 tablet 2 (two) times a day. 180 each 3    Pediatric Multivitamins-Iron (CEROVITE JR) 18 MG chewable tablet       phentermine (ADIPEX-P) 37.5 MG tablet TAKE 1/2 TO 1 (ONE-HALF TO ONE) TABLET BY MOUTH ONCE DAILY BEFORE BREAKFAST 90 tablet 1    Propylene Glycol, PF, 0.6 % SOLN 1 drop.      QUEtiapine (SEROQUEL) 25 MG tablet [QUETIAPINE (SEROQUEL) 25 MG TABLET] Take 25 mg by mouth at bedtime.      SF 5000 PLUS 1.1 % Crea [SF 5000 PLUS 1.1 % CREA] Apply 1 application topically as needed.  0    sodium fluoride dental gel (PREVIDENT) 1.1 % GEL topical gel BRUSH TEETH WITH GEL TWICE DAILY. DO NOT EAT OR DRINK FOR 30 MINUTES AFTER      SYMBICORT 80-4.5 MCG/ACT Inhaler INHALE 2 PUFFS BY MOUTH TWICE DAILY. RINSE MOUTH /. GARGLE AFTER USE      traZODone (DESYREL) 50 MG tablet [TRAZODONE (DESYREL) 50 MG TABLET] at bedtime as needed.      triamcinolone (KENALOG) 0.1 % external lotion Apply topically 2 times daily.      trospium (SANCTURA) 20 MG tablet Take 20 mg by mouth 2 times daily.      venlafaxine (EFFEXOR-XR) 150 MG 24 hr capsule [VENLAFAXINE (EFFEXOR-XR) 150 MG 24 HR CAPSULE] Take 1 capsule by mouth daily.  11    [DISCONTINUED] clotrimazole (LOTRIMIN) 1 % external cream Apply topically 2 times daily 60 g 11     No facility-administered encounter medications on file as of 1/6/2025.        Allergies   Allergen Reactions    Cephalexin Hives     Vs cherries. Seen 7/4 urgency room.    Aspirin Other (See Comments)     Hx gastric bypass    Hydrocodone Other (See Comments)     Itching    Latex Unknown     Itchy    Nsaids Other (See Comments)     Hx of gastric bypass    Vicodin [Hydrocodone-Acetaminophen] Unknown     Itchy        Review of systems:  A full 10 point review of systems was obtained and was negative except for the pertinent positives and negatives stated within the HPI.    Objective Findings:   Physical Exam:    Constitutional: BP (!) 153/87   Pulse 87   Ht 1.6 m (5' 3\")   Wt 90.3 kg (199 lb)   SpO2 95%   " BMI 35.25 kg/m    Physical Exam:    General: Alert, cooperative, no distress, well-appearing  Head: Atraumatic, normocephalic, no obvious abnormalities   Eyes: EOMI, Sclera anicteric, no obvious conjunctival hemorrhage   Nose: Nares normal, no obvious malformation, no obvious rhinorrhea   Musculoskeletal: Range of motion intact, no obvious strength deficit  Skin: No jaundice, no obvious rash  Neurologic: AAOx3, no obvious neurologic abnormality  Psychiatric: Normal Affect, appropriate mood  Extremities: No obvious edema, no obvious malformation        Labs, Radiology, Pathology     Lab Results   Component Value Date    WBC 7.9 10/18/2024    WBC 10.1 05/08/2023    WBC 9.4 04/21/2022    HGB 12.7 10/18/2024    HGB 12.5 05/08/2023    HGB 12.5 04/21/2022     10/18/2024     05/08/2023     04/21/2022    CHOL 205 (H) 10/18/2024    CHOL 181 05/08/2023    CHOL 181 04/21/2022    TRIG 95 10/18/2024    TRIG 109 05/08/2023    TRIG 105 04/21/2022     10/18/2024    HDL 85 05/08/2023    HDL 92 04/21/2022    ALT 43 10/18/2024    ALT 56 (H) 05/08/2023    ALT 63 (H) 04/21/2022    AST 65 (H) 10/18/2024    AST 94 (H) 05/08/2023     (H) 04/21/2022     10/18/2024     05/08/2023     04/21/2022    BUN 18.0 10/18/2024    BUN 10.9 05/08/2023    BUN 8 04/21/2022    CO2 29 10/18/2024    CO2 27 05/08/2023    CO2 27 04/21/2022    TSH 16.79 (H) 04/21/2022        Liver Function Studies -   Recent Labs   Lab Test 03/08/21  1425   PROTTOTAL 7.6   ALBUMIN 4.4   BILITOTAL 0.7   ALKPHOS 174*   AST 58*   ALT 63*          Patient Active Problem List    Diagnosis Date Noted    Hepatic steatosis 06/06/2023     Priority: Medium    Elevated TSH 04/27/2022     Priority: Medium    Elevated liver enzymes 04/27/2022     Priority: Medium    Alkaline phosphatase elevation 04/27/2022     Priority: Medium    Morbid obesity (H) 04/21/2022     Priority: Medium    Congestive heart failure, unspecified HF chronicity,  unspecified heart failure type (H) 03/29/2022     Priority: Medium    Esophageal dyskinesia 03/29/2022     Priority: Medium    Non-cardiac chest pain 03/29/2022     Priority: Medium    Regurgitation of food 03/29/2022     Priority: Medium    Esophageal dysphagia 03/29/2022     Priority: Medium      Assessment and Plan   Assessment:    Yuliana Armendariz is a 64 year old female with asthma/COPD, GIORGIO with CPAP, HTN, HLD, CHF (No CVA or MI), seizure disorder (on keppra almost 10 years, bariatric surgery (onesimo-en-y 4/23/2012), dysphonia who is presenting as a follow up patient in consultation at the request of Dr. Nino (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    The patient has currently been undergoing multiple health issues. She continues to describe dysphagia. She is not interested in repeating a manometry at this time. 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 She previously had improvement with botulinum toxin injection in 2022. In the meantime plan for increasing the omeprazole 40mg daily instead of 20mg daily given reflux symptoms and reliance on Tums in addition to omeprazole.     Reports having issues with sedation with prior colonoscopy.      Plan:  Please schedule an upper endoscopy with dilation and botox of the lower esophageal sphincter  Begin omeprazole 40mg instead of 20mg    Follow up plan:   Return to clinic 6 months and as needed.    The risks and benefits of my recommendations, as well as other treatment options were discussed with the patient and any available family today. All questions were answered.     Follow up: As planned above. Today, I personally spent 45 minutes in direct face to face time with the patient, of which greater than 50% of the time was spent in patient education and counseling as described above. Approximately 5 minutes were spent  on indirect care associated with the patient's consultation including but not limited to review of: patient medical records to date, clinic visits, hospital records, lab results, imaging studies, procedural documentation, and coordinating care with other providers. The findings from this review are summarized in the above note. All of the above accounted for a cumulative time of 45 minutes and was performed on the date of service.     The patient verbalized understanding of the plan and was appreciative for the time spent and information provided during the office visit.     Author:   Parminder Lynch DO   of Medicine  Director, Esophageal Disorders Program  Director of Endoscopy  Division of Gastroenterology, Hepatology, and Nutrition  [facility]    Dr. Lynch speaks for [employer] regarding dupilumab and has participated in advisory boards for the medication. He receives income for these activities. When discussing the medication, patients were informed of Dr. Lynch's role and potential conflict of interest. All questions and/or concerns were answered during the encounter.    Dr. Lynch speaks for [employer] regarding vonoprazan. He receives income for these activities. When discussing the medication, patients were informed of Dr. Lynch's role and potential conflict of interest. All questions and/or concerns were answered during the encounter.       Documentation assisted by voice recognition and documentation system.

## 2025-01-06 NOTE — LETTER
1/6/2025      Yuliana Armendariz  [address]  [facility] [address]      Dear Colleague,    Thank you for referring your patient, Yuliana Armendariz, to the [facility] [place]. Please see a copy of my visit note below.      Gastroenterology Visit for: Yuliana Armendariz 1960   MRN: [id number]     Reason for Visit:  chief complaint    Referred by: Syed  / [address] [place] [address] / [facility] [id number]  Patient Care Team:  Luan Mccray MD as PCP - General  Parminder Lynch DO as MD (Gastroenterology)  Parminder Lynch DO as Assigned Gastroenterology Provider    History of Present Illness:   Yuliana Armendariz is a 64 year old female with asthma/COPD, GIORGIO with CPAP, HTN, HLD, CHF (No CVA or MI), seizure disorder (on keppra almost 10 years, bariatric surgery (onesimo-en-y 4/23/2014), dysphonia who is presenting as a follow up patient in consultation at the request of Dr. Nino ([facility]) with a chief complaint of dysphagia and hypercontractile esophagus.    1/6/25  Had back surgery anterior approach and posterior approach. She had several follow ups with infection and admissions. Had been in the hospital for over a month due to infection and wound vac. Had syncopal episode during admission.  While admitted had bladder infection and now has a stent (ureteral). Walking with cane. Sleeping with a lot of pillows. History of ganglion cyst, knee surgery.    Continues to have swallowing issues. Typical lunch can be any of the following: low sodium soup. Half of a sandwich. Can eat bread. Yogurt. Salad. Fruits, bananas. Deli meats.     Foods that get caught when swallowing:  Pasta, rice, slice of bread, Lobster/shrimp, beans    Reports incomplete controlled acid reflux on omeprazole 20mg daily requiring Tums in addition.  -----------------------------------------------------------------------    9/11/23  Spinal stenosis new diagnosis. She has been getting physical therapy.  Recently had two rotator cuff repairs. She believes she may require additional procedures.     States she still has a problem with food getting caught. This is occurring daily. Not as bad as it was when we first met. She is starting to get acid reflux and is on that daily. After food gets stuck she will drink water and it will slowly go down. Occasionally she will feel it sit in the esophagus. Does not eat large portions because of gastric bypass. Eats minimal bread. Avoids food with skin. Eats a lot of chicken and fish. +dietary changes: chewing thoroughly.     Patient states she has an upcoming colonoscopy scheduled at [facility].     See updated BEDQ and Eckardt score below: These patient reported outcomes are pertinent to the HPI and have personally been reviewed.  ----------------------------------------------  Interval History 3/6/2023:   The patient states that she has been doing well. Every so often she has difficulty. 1-2 times a week or month she may have the sensation that food gets caught. Continues to take omeprazole. The patient had regurgitation this morning. Botox appears to have helped and the improvement has been sustained. The patient states that her daughter has achalasia and is concerned about if she has achalasia or not. Patient states that she has early satiety. She denies weight loss due to dysphagia.       See updated BEDQ and eckardt score.   ---------------------------------------------------------------  3/29/2022 Interval History:  Yuliana BOWER Marcello states her main symptoms are chest pain and regurgitation along with dysphagia.    Not currently taking omeprazole since taking diltiazem. Patient states that she has heartburn which is worse now that she is off of her PPI. The patient has a history of bariatric surgery. Her symptoms started with non-cardiac chest pain and regurgitation. She feels that food will get stuck and then regurgitate after 30-40 minutes. She can sip room temperature water  "to help with food that gets stuck. Cold water causes more discomfort. She occasionally drinks ginger ale to see if that helps \"break up acid\". She chews food very thoroughly. Barely eats bread because of symptoms. She is scared to eat foods because of it getting caught. Sweet potatoes and baked chicken.     Her symptoms have been ongoing for \"years\" but now it is worse. Worsening over the past year.     Has had prior endoscopy with balloon dilation to 19mm for a non-obstructing schatzki ring. This did not improve her symptoms. She has been on diltiazem and peppermint without relief.     Daughter diagnosed with achalasia and underwent surgery. She was diagnosed when she was in high school. Father of her daughter has dysphagia as well. Patient does not know of anyone on her side of the family    Patient feels that she has lost approximately 35 pounds however her objective weight is 225lbs.  ---------------------------------------------------------------     Yuliana Armendariz denies  odynophagia,  nausea, vomiting, early satiety, abdominal pain, abdominal distension/bloating, diarrhea, constipation, hematochezia, or melena. No unintentional weight loss.     Wt Readings from Last 5 Encounters:   01/06/25 90.3 kg (199 lb)   11/14/24 88.9 kg (196 lb)   12/06/23 92.5 kg (204 lb)   09/11/23 98.6 kg (217 lb 6.4 oz)   06/06/23 98.7 kg (217 lb 9.6 oz)        Esophageal Questionnaire(s)    BEDQ Questionnaire      2/17/2022    10:09 AM 3/29/2022     7:00 AM 3/6/2023     1:24 PM 9/11/2023     1:00 PM   BEDQ Questionnaire: How Often Have You Had the Following?   Trouble eating solid food (meat, bread, vegetables) 5 4 2  5   Trouble eating soft foods (yogurt, jello, pudding) 0 2 0  0   Trouble swallowing liquids 0 0 0  0   Pain while swallowing 2 4 1  0   Coughing or choking while swallowing foods or liquids 0 5 0  0   Total Score: 7 15 3 5       Proxy-reported         2/17/2022    10:09 AM 3/29/2022     7:00 AM 3/6/2023     1:24 PM " 9/11/2023     1:00 PM   BEDQ Questionnaire: Discomfort/Pain Ratings   Eating solid food (meat, bread, vegetables) 5 3 2  3   Eating soft foods (yogurt, jello, pudding) 0 3 0  0   Drinking liquid 0 0 0  0   Total Score: 5 6 2 3       Proxy-reported       Eckardt Questionnaire      2/17/2022    10:09 AM 3/29/2022     7:00 AM 3/6/2023     1:25 PM 9/11/2023     1:00 PM   Eckardt Questionnaire   Dysphagia 3 3 1 2   Regurgitation 1 1 1  0   Retrosternal Pain 2 1 0  1   Weight Loss (kg) 0 0 0 0   Total Score:  6 5 2 3       Proxy-reported       Promis 10 Questionnaire      2/17/2022    10:09 AM 3/29/2022     7:00 AM 3/6/2023     1:28 PM   PROMIS 10 FLOWSHEET DATA   In general, would you say your health is: 2 2 2    In general, would you say your quality of life is: 3 3 2    In general, how would you rate your physical health? 3 2 2    In general, how would you rate your mental health, including your mood and your ability to think? 5 5 2    In general, how would you rate your satisfaction with your social activities and relationships? 5 5 2    In general, please rate how well you carry out your usual social activities and roles. (This includes activities at home, at work and in your community, and responsibilities as a parent, child, spouse, employee, friend, etc.) 5 5 2    To what extent are you able to carry out your everyday physical activities such as walking, climbing stairs, carrying groceries, or moving a chair? 3 3 4    In the past 7 days, how often have you been bothered by emotional problems such as feeling anxious, depressed, or irritable? 3 2 5    In the past 7 days, how would you rate your fatigue on average? 4 3 4    In the past 7 days, how would you rate your pain on average, where 0 means no pain, and 10 means worst imaginable pain? 8 8 10    Mental health question re-calculation - no clinical value 3 4 1   Physical health question re-calculation - no clinical value 2 3 2   Pain question re-calculation - no  clinical value 2 2 1   Global Mental Health Score 16 17 7   Global Physical Health Score 10 10 9   PROMIS TOTAL - SUBSCORES 26 27 16       Proxy-reported     STUDIES & PROCEDURES:    EGD:     4/26/2022   Findings:        The examined esophagus was normal.        The Z-line was regular and was found 36 cm from the incisors. With the        patient in the left lateral decubitus position, a 16 cm long 3 mm        diameter functional lumen imaging probe (FLIP), with a volume-based        barostat bag, was placed at the lower esophageal sphincter without        endoscopic visualization for the diagnostic evaluation of dysphagia.        Saline was infused into the bag to a volume of 30 mL. Additional saline        was infused to a volume of 40 mL and Additional saline was infused to a        volume of 50 mL. FLIP Topography was performed using stepwise        distensions and demonstrated repetitive antegrade contractions (RAC).        The catheter was deflated and withdrawn. SEE OP NOTE IN EPIC FOR FULL        DATA TABLE. Area was successfully injected with 100 units botulinum        toxin. All contractions were hypercontractile. Did not fill beyond 50ml        due to the significant contractile force.        Abnormal motility was noted in the esophagus. There is spasticity of the        esophageal body. Suggestive of hypercontractile esophagus.        One non-bleeding superficial gastric-jejunal anastomotic ulcer with no        stigmata of bleeding was found at the anastomosis. The lesion was 4 mm        in largest dimension. Biopsies were taken with a cold forceps for        histology. Verification of patient identification for the specimen was        done. Estimated blood loss: none.        The entire examined stomach was normal. Biopsies were taken with a cold        forceps for Helicobacter pylori testing. Verification of patient        identification for the specimen was done. Estimated blood loss: none.        The  examined jejunum was normal.                                                                                     Impression:            - Normal esophagus.                          - Z-line regular, 36 cm from the incisors. Injected                          with botulinum toxin.                          - Abnormal esophageal motility, established esophageal                          spasm.                          - Non-bleeding gastric ulcer with no stigmata of                          bleeding. Biopsied.                          - Normal stomach. Biopsied.                          - Normal examined jejunum.                          - FLIP imaging performed, consistent with spastic                          esophageal motility disorder.     Final Diagnosis   A. STOMACH, BIOPSY AT ANASTOMOSIS:  Jejunal mucosa with small ulceration, neutrophils and other changes suggestive of peptic jejunitis; no dysplasia or malignancy      B. STOMACH, BIOPSY:  Gastric body/fundic-type mucosa with PPI-like changes; no significant inflammation; negative for H. pylori, intestinal metaplasia, or dysplasia          Colonoscopy:  Date:  Impression:  Pathology Report:     EndoFLIP directed at the UES or LES (8cm (EF-325) balloon length or 16cm (EF-322) balloon length):   Date:  8cm balloon  Balloon inflation Balloon pressure CSA (mm^2) DI (mm^2/mmHg) Dmin (mm) Compliance   20 (ladmark ID)        30        40        50           EndoFLIP directed at the LES (16cm (EF-322) balloon length):   Date: 4/26/22     16cm balloon  Balloon inflation Balloon pressure CSA (mm^2) DI (mm^2/mmHg) Dmin (mm) Compliance   30 (ladmark ID) [id number]   9.78 21.2     40 66.2   8.58 26.9     50 77   8.84 29.4     60             70                           Hypercontractile RACs     High Resolution Manometry:  Date: 2/17/22  Impression:   The baseline tone of the lower esophageal sphincter was normotensive. The lower esophageal sphincter was not able to relax  appropriately as measured by the IRP (17.0). The EGJ morphology was type 1. There was some increased pressurization below the LES which may be due to the catheter coiling either at the LES or in the gastric pouch post onesimo-en-y bypass. There was peristalsis visible. The DCI, DL, and contractile pattern were not within normal limits. 8/10 swallows were hypercontractile. There were 3/10 swallows with elevated intrabolus pressure and compartmentalized pressurization. Rapid water swallows were performed with less than normal augmentation. Rapid Drink Challenge does not indicate an elevated IRP. Supine liquid swallows were performed. Upright liquid swallows were performed with a median upright IRP of 8.9. All 5/5 swallows were hypercontractile and all 5 had intrabolus pressurization. Bolus transit as measured by impedance was complete in all 10/10 swallows. This is most consistent with hypercontractile esophagus.       Wording of procedure description and interpretation was adapted from [facility] Weekly Motility Conference (2018).       Impressions   Based on the most recent [place] Classification v4.0, the findings are most consistent with hypercontractile esophagus. There is some intrabolus pressurization noted and an elevated supine IRP however this does not meet criteria for EGJ outflow obstruction based on the improved IRP seen on upright swallows. An endoscopy with endoFLIP may be warranted to maximally evaluate the EGJ prior to any possible non-medical intervention.     *Note, this study was performed while the patient was taking diltiazem.     Dr Parminder Lynch     PH/Impedance:  Date:  Impression:     Bravo:  48 or 96hr  Date:  Impression:    CT:  Date:  Impression:    Esophagram:  Date:  Impression:     Prior medical records were reviewed including, but not limited to, notes from referring providers, lab work, radiographic tests, and other diagnostic tests. Pertinent results were  summarized above.     History     Past Medical History:   Diagnosis Date     Asthma      Back pain      Congestive heart failure (H)      COPD (chronic obstructive pulmonary disease) (H)      Degenerative disc disease      Diabetes type 2, controlled (H)      Dyslipidemia      Esophageal dyskinesia      GERD (gastroesophageal reflux disease)      Gout      Hepatic steatosis     nodular appearance suggesting fibrosis/cirrhosis     Hyperparathyroidism (H)      Hyperparathyroidism, secondary (H)      Hypertension      Low bone mass      Metabolic syndrome      Morbid obesity (H)      Osteoarthritis      Reflux        Past Surgical History:   Procedure Laterality Date     ARTHROSCOPY KNEE WITH MENISCAL REPAIR Left 01/2019     CHOLECYSTECTOMY       ESOPHAGEAL BALLOON PROVOCATION STUDY N/A 4/26/2022    Procedure: ESOPHAGEAL BALLOON PROVOCATION STUDY;  Surgeon: Parminder Lynch DO;  Location: [facility]     ESOPHAGOGASTRODUODENOSCOPY, WITH BOTULINUM TOXIN INJECTION  4/26/2022    Procedure: Esophagogastroduodenoscopy, With Botulinum Toxin Injection;  Surgeon: Parminder Lynch DO;  Location: [facility]     ESOPHAGOSCOPY, GASTROSCOPY, DUODENOSCOPY (EGD), COMBINED N/A 4/26/2022    Procedure: ESOPHAGOGASTRODUODENOSCOPY, WITH BIOPSY;  Surgeon: Parminder Lynch DO;  Location: [facility]     HYSTERECTOMY       IR LUMBAR PUNCTURE  11/17/2023     REVISION RIAN-EN-Y  4/23/2014    Laura     TOTAL KNEE ARTHROPLASTY Left 03/2019     TUBAL LIGATION         Social History     Socioeconomic History     Marital status: Single     Spouse name: Not on file     Number of children: Not on file     Years of education: Not on file     Highest education level: Not on file   Occupational History     Not on file   Tobacco Use     Smoking status: Former     Smokeless tobacco: Never     Tobacco comments:     quit over 25 years ago   Substance and Sexual Activity     Alcohol use: Yes     Comment: Alcoholic Drinks/day: Rarely     Drug use: No     Sexual activity: Not  Currently     Partners: Male     Birth control/protection: Surgical   Other Topics Concern     Not on file   Social History Narrative    Single. Lives with her dog.  And her fiance (her children's father) on and off     Social Drivers of Health     Financial Resource Strain: Not on file   Food Insecurity: No Food Insecurity (5/16/2024)    Received from [facility]    Hunger Vital Sign      Worried About Running Out of Food in the Last Year: Never true      Ran Out of Food in the Last Year: Never true   Transportation Needs: Not on file   Physical Activity: Not on file   Stress: Not on file   Social Connections: Not on file   Interpersonal Safety: Unknown (5/28/2024)    Received from [facility]    Humiliation, Afraid, Rape, and Kick questionnaire      Fear of Current or Ex-Partner: Not on file      Emotionally Abused: Not on file      Physically Abused: No      Sexually Abused: No   Housing Stability: Not on file       Family History   Problem Relation Age of Onset     Diabetes Mother      Heart Disease Mother      Hyperlipidemia Mother      Hypertension Mother      Family history reviewed and edited as appropriate    Medications and Allergies:     Outpatient Encounter Medications as of 1/6/2025   Medication Sig Dispense Refill     acetaminophen (TYLENOL) 500 MG tablet Take 500-1,000 mg by mouth as needed       ADVAIR -21 mcg/actuation inhaler [ADVAIR -21 MCG/ACTUATION INHALER] Inhale 1 puff 2 (two) times a day.  11     albuterol (PROVENTIL HFA;VENTOLIN HFA) 90 mcg/actuation inhaler [ALBUTEROL (PROVENTIL HFA;VENTOLIN HFA) 90 MCG/ACTUATION INHALER] Inhale 2 puffs every 6 (six) hours as needed for wheezing.       albuterol (PROVENTIL) 2.5 mg /3 mL (0.083 %) nebulizer solution [ALBUTEROL (PROVENTIL) 2.5 MG /3 ML (0.083 %) NEBULIZER SOLUTION] Take 2.5 mg by nebulization every 6 (six) hours as needed for wheezing.       amLODIPine (NORVASC) 5 MG tablet [AMLODIPINE (NORVASC) 5 MG TABLET] Take 2.5 mg by  mouth daily.        atorvastatin (LIPITOR) 20 MG tablet [ATORVASTATIN (LIPITOR) 20 MG TABLET] Take 20 mg by mouth bedtime.       CALCIUM CITRATE/VITAMIN D3 (CITRACAL + D PETITES ORAL) [CALCIUM CITRATE/VITAMIN D3 (CITRACAL + D PETITES ORAL)] Take 2 tablets by mouth 2 (two) times a day.        carvedilol (COREG) 3.125 MG tablet [CARVEDILOL (COREG) 3.125 MG TABLET] Take 3.125 mg by mouth bedtime.        cetirizine (ZYRTEC) 10 MG tablet Take 10 mg by mouth daily.       cholecalciferol (VITAMIN D3) 125 mcg (5000 units) capsule Take 125 mcg by mouth daily       clotrimazole (LOTRIMIN) 1 % external cream APPLY TOPICALLY TO THE AFFECTED AREA TWICE DAILY 60 g 11     Colloidal Oatmeal 1 % CREA Apply topically.       CONTOUR NEXT TEST STRIPS strips [CONTOUR NEXT TEST STRIPS STRIPS] daily. TEST ONCE DAILY       cyanocobalamin, vitamin B-12, 1,000 mcg Subl [CYANOCOBALAMIN, VITAMIN B-12, 1,000 MCG SUBL] Place 1 tablet (1,000 mcg total) under the tongue daily. 90 tablet prn     diltiazem (CARDIZEM) 60 MG tablet Take 1 tablet by mouth 4 times daily.       fluocinonide (LIDEX) 0.05 % cream [FLUOCINONIDE (LIDEX) 0.05 % CREAM] Apply 1 application topically 2 (two) times a day.       gabapentin (NEURONTIN) 100 MG capsule Take 100 mg by mouth once. Unsure of does       gabapentin (NEURONTIN) 300 MG capsule        hydrocortisone 2.5 % cream [HYDROCORTISONE 2.5 % CREAM] Apply 1 application topically as needed.       levETIRAcetam (KEPPRA) 1000 MG tablet Take 1 tablet by mouth 2 times daily.  3     losartan (COZAAR) 100 MG tablet        methocarbamol (ROBAXIN) 500 MG tablet Take 500 mg by mouth 3 times daily.       miconazole (MICATIN) 2 % cream Place vaginally at bedtime.       Microlet Lancets MISC daily.       montelukast (SINGULAIR) 10 mg tablet [MONTELUKAST (SINGULAIR) 10 MG TABLET] Take 10 mg by mouth bedtime.       nitroFURantoin macrocrystal-monohydrate (MACROBID) 100 MG capsule Take 100 mg by mouth 2 times daily.       Nutritional  Supplements (JACQUELIN) POWD        omeprazole (PRILOSEC) 20 MG capsule Take 20 mg by mouth daily       omeprazole (PRILOSEC) 40 MG DR capsule Take 1 capsule (40 mg) by mouth daily. 90 capsule 3     oxyBUTYnin (DITROPAN) 5 MG tablet Take 5 mg by mouth.       pediatric multivit-iron-min (CEROVITE JR) Chew [PEDIATRIC MULTIVIT-IRON-MIN (CEROVITE JR) CHEW] Chew 1 tablet 2 (two) times a day. 180 each 3     Pediatric Multivitamins-Iron (CEROVITE JR) 18 MG chewable tablet        phentermine (ADIPEX-P) 37.5 MG tablet TAKE 1/2 TO 1 (ONE-HALF TO ONE) TABLET BY MOUTH ONCE DAILY BEFORE BREAKFAST 90 tablet 1     Propylene Glycol, PF, 0.6 % SOLN 1 drop.       QUEtiapine (SEROQUEL) 25 MG tablet [QUETIAPINE (SEROQUEL) 25 MG TABLET] Take 25 mg by mouth at bedtime.       SF 5000 PLUS 1.1 % Crea [SF 5000 PLUS 1.1 % CREA] Apply 1 application topically as needed.  0     sodium fluoride dental gel (PREVIDENT) 1.1 % GEL topical gel BRUSH TEETH WITH GEL TWICE DAILY. DO NOT EAT OR DRINK FOR 30 MINUTES AFTER       SYMBICORT 80-4.5 MCG/ACT Inhaler INHALE 2 PUFFS BY MOUTH TWICE DAILY. RINSE MOUTH /. GARGLE AFTER USE       traZODone (DESYREL) 50 MG tablet [TRAZODONE (DESYREL) 50 MG TABLET] at bedtime as needed.       triamcinolone (KENALOG) 0.1 % external lotion Apply topically 2 times daily.       trospium (SANCTURA) 20 MG tablet Take 20 mg by mouth 2 times daily.       venlafaxine (EFFEXOR-XR) 150 MG 24 hr capsule [VENLAFAXINE (EFFEXOR-XR) 150 MG 24 HR CAPSULE] Take 1 capsule by mouth daily.  11     [DISCONTINUED] clotrimazole (LOTRIMIN) 1 % external cream Apply topically 2 times daily 60 g 11     No facility-administered encounter medications on file as of 1/6/2025.        Allergies   Allergen Reactions     Cephalexin Hives     Vs cherries. Seen 7/4 urgency room.     Aspirin Other (See Comments)     Hx gastric bypass     Hydrocodone Other (See Comments)     Itching     Latex Unknown     Itchy     Nsaids Other (See Comments)     Hx of gastric  "bypass     Vicodin [Hydrocodone-Acetaminophen] Unknown     Itchy        Review of systems:  A full 10 point review of systems was obtained and was negative except for the pertinent positives and negatives stated within the HPI.    Objective Findings:   Physical Exam:    Constitutional: BP (!) 153/87   Pulse 87   Ht 1.6 m (5' 3\")   Wt 90.3 kg (199 lb)   SpO2 95%   BMI 35.25 kg/m    Physical Exam:    General: Alert, cooperative, no distress, well-appearing  Head: Atraumatic, normocephalic, no obvious abnormalities   Eyes: EOMI, Sclera anicteric, no obvious conjunctival hemorrhage   Nose: Nares normal, no obvious malformation, no obvious rhinorrhea   Musculoskeletal: Range of motion intact, no obvious strength deficit  Skin: No jaundice, no obvious rash  Neurologic: AAOx3, no obvious neurologic abnormality  Psychiatric: Normal Affect, appropriate mood  Extremities: No obvious edema, no obvious malformation        Labs, Radiology, Pathology     Lab Results   Component Value Date    WBC 7.9 10/18/2024    WBC 10.1 05/08/2023    WBC 9.4 04/21/2022    HGB 12.7 10/18/2024    HGB 12.5 05/08/2023    HGB 12.5 04/21/2022     10/18/2024     05/08/2023     04/21/2022    CHOL 205 (H) 10/18/2024    CHOL 181 05/08/2023    CHOL 181 04/21/2022    TRIG 95 10/18/2024    TRIG 109 05/08/2023    TRIG 105 04/21/2022     10/18/2024    HDL 85 05/08/2023    HDL 92 04/21/2022    ALT 43 10/18/2024    ALT 56 (H) 05/08/2023    ALT 63 (H) 04/21/2022    AST 65 (H) 10/18/2024    AST 94 (H) 05/08/2023     (H) 04/21/2022     10/18/2024     05/08/2023     04/21/2022    BUN 18.0 10/18/2024    BUN 10.9 05/08/2023    BUN 8 04/21/2022    CO2 29 10/18/2024    CO2 27 05/08/2023    CO2 27 04/21/2022    TSH 16.79 (H) 04/21/2022        Liver Function Studies -   Recent Labs   Lab Test 03/08/21  1425   PROTTOTAL 7.6   ALBUMIN 4.4   BILITOTAL 0.7   ALKPHOS 174*   AST 58*   ALT 63*          Patient Active " Problem List    Diagnosis Date Noted     Hepatic steatosis 06/06/2023     Priority: Medium     Elevated TSH 04/27/2022     Priority: Medium     Elevated liver enzymes 04/27/2022     Priority: Medium     Alkaline phosphatase elevation 04/27/2022     Priority: Medium     Morbid obesity (H) 04/21/2022     Priority: Medium     Congestive heart failure, unspecified HF chronicity, unspecified heart failure type (H) 03/29/2022     Priority: Medium     Esophageal dyskinesia 03/29/2022     Priority: Medium     Non-cardiac chest pain 03/29/2022     Priority: Medium     Regurgitation of food 03/29/2022     Priority: Medium     Esophageal dysphagia 03/29/2022     Priority: Medium      Assessment and Plan   Assessment:    Yuliana Armendariz is a 64 year old female with asthma/COPD, GIORGIO with CPAP, HTN, HLD, CHF (No CVA or MI), seizure disorder (on keppra almost 10 years, bariatric surgery (onesimo-en-y 4/23/2014), dysphonia who is presenting as a follow up patient in consultation at the request of Dr. Nino ([facility]) with a chief complaint of dysphagia and hypercontractile esophagus.    The patient has currently been undergoing multiple health issues. She continues to describe dysphagia. She is not interested in repeating a manometry at this time. 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 She previously had improvement with botulinum toxin injection in 2022. In the meantime plan for increasing the omeprazole 40mg daily instead of 20mg daily given reflux symptoms and reliance on Tums in addition to omeprazole.     Reports having issues with sedation with prior colonoscopy.      Plan:  Please schedule an upper endoscopy with dilation and botox of the lower esophageal sphincter  Begin omeprazole 40mg instead of 20mg    Follow up plan:   Return to clinic 6 months and as needed.    The risks and benefits of my recommendations,  as well as other treatment options were discussed with the patient and any available family today. All questions were answered.     Follow up: As planned above. Today, I personally spent 45 minutes in direct face to face time with the patient, of which greater than 50% of the time was spent in patient education and counseling as described above. Approximately 5 minutes were spent on indirect care associated with the patient's consultation including but not limited to review of: patient medical records to date, clinic visits, hospital records, lab results, imaging studies, procedural documentation, and coordinating care with other providers. The findings from this review are summarized in the above note. All of the above accounted for a cumulative time of 45 minutes and was performed on the date of service.     The patient verbalized understanding of the plan and was appreciative for the time spent and information provided during the office visit.     Author:   Parminder Lynch DO   of Medicine  Director, Esophageal Disorders Program  Director of Endoscopy  Division of Gastroenterology, Hepatology, and Nutrition  [facility]    Dr. Lynch speaks for [employer] regarding dupilumab and has participated in advisory boards for the medication. He receives income for these activities. When discussing the medication, patients were informed of Dr. Lynch's role and potential conflict of interest. All questions and/or concerns were answered during the encounter.    Dr. Lynch speaks for [employer] regarding vonoprazan. He receives income for these activities. When discussing the medication, patients were informed of Dr. Lynch's role and potential conflict of interest. All questions and/or concerns were answered during the encounter.       Documentation assisted by voice recognition and documentation system.      Again, thank you for allowing me to participate in the care of your  patient.        Sincerely,        Parminder Lynch, DO    Electronically signed

## 2025-01-06 NOTE — PATIENT INSTRUCTIONS
It was a pleasure taking care of you today.  I've included a brief summary of our discussion and care plan from today's visit below.  Please review this information with your primary care provider.    Please schedule an upper endoscopy with dilation and botox of the lower esophageal sphincter  Begin omeprazole 40mg instead of 20mg    If you feel you received exceptional care and are interested in supporting the clinical and research goals of Dr. Lynch or the Division of Gastroenterology, Hepatology, and Nutrition please contact him directly through [employer]  to discuss opportunities to donate.    Sincerely,    Parminder Lynch DO   of Medicine  Director, Esophageal Disorders Program  Division of Gastroenterology, Hepatology, and Nutrition  [facility]      Please see below for any additional questions and scheduling guidelines.    Sign up for [employer]: [employer] patient portal serves as a secure platform for accessing your medical records from the [facility]. Additionally, [employer] facilitates easy, timely, and secure messaging with your care team. If you have not signed up, you may do so by using the provided code or calling [phone].    Coordinating your care after your visit:  There are multiple options for scheduling your follow-up care based on your provider's recommendation.    How do I schedule a follow-up clinic appointment:   After your appointment, you may receive scheduling assistance with the Clinic Coordinators by having a seat in the waiting room and a Clinic Coordinator will call you up to schedule.  Virtual visits or after you leave the clinic:  Your provider has placed a follow-up order in the [employer] portal for scheduling your return appointment. A member of the scheduling team will contact you to schedule.  [facility] Scheduling: Timely scheduling through [employer] is advised to ensure appointment availability.   Call to schedule: You may schedule your follow-up  appointment(s) by calling [phone], option 1.    How do I schedule my endoscopy or colonoscopy procedure:  If a procedure, such as a colonoscopy or upper endoscopy was ordered by your provider, the scheduling team will contact you to schedule this procedure. Or you may choose to call to schedule at   [phone], option 2.  Please allow 20-30 minutes when scheduling a procedure.    How do I get my blood work done? To get your blood work done, you need to schedule a lab appointment at an [facility]. There are multiple ways to schedule:   At the clinic: The Clinic Coordinator you meet after your visit can help you schedule a lab appointment.   [facility] scheduling: [facility] offers online lab scheduling at all [facility] laboratory locations.   Call to schedule: You can call [phone] to schedule your lab appointment.    How do I schedule my imaging study: To schedule imaging studies, such as CT scans, ultrasounds, MRIs, or X-rays, contact Imaging Services at [phone].    How do I schedule a referral to another doctor: If your provider recommended a referral to another specialist(s), the referral order was placed by your provider. You will receive a phone call to schedule this referral, or you may choose to call the number attached to the referral to self-schedule.    For Post-Visit Question(s):  For any inquiries following today's visit:  Please utilize [facility] messaging and allow 48 hours for reply or contact the Call Center during normal business hours at [phone], option 3.  For Emergent After-hours questions, contact the On-Call GI Fellow through the [facility]  at [phone].  In addition, you may contact your Nurse directly using the provided contact information.    Test Results:  Test results will be accessible via [facility] in compliance with the 21st Century Cures Act. This means that your results will be available to you at the same time as your  provider. Often you may see your results before your provider does. Results are reviewed by staff within two weeks with communication follow-up. Results may be released in the patient portal prior to your care team review.    Prescription Refill(s):  Medication prescribed by your provider will be addressed during your visit. For future refills, please coordinate with your pharmacy. If you have not had a recent clinic visit or routine labs, for your safety, your provider may not be able to refill your prescription.

## 2025-03-05 ENCOUNTER — TELEPHONE (OUTPATIENT)
Dept: GASTROENTEROLOGY | Facility: CLINIC | Age: 65
End: 2025-03-05
Payer: COMMERCIAL

## 2025-03-05 NOTE — TELEPHONE ENCOUNTER
"Endoscopy Scheduling Screen    Have you had any respiratory illness or flu-like symptoms in the last 10 days?  No    What is your communication preference for Instructions and/or Bowel Prep?   Mail/USPS    What insurance is in the chart?  Other:  [facility]    Ordering/Referring Provider:     JANET ROUSE      (If ordering provider performs procedure, schedule with ordering provider unless otherwise instructed. )    BMI: Estimated body mass index is 35.25 kg/m  as calculated from the following:    Height as of 1/6/25: 1.6 m (5' 3\").    Weight as of 1/6/25: 90.3 kg (199 lb).     Sedation Ordered  MAC/deep sedation.   BMI<= 45 45 < BMI <= 48 48 < BMI < = 50  BMI > 50   No Restrictions No [facility]  No [facility]  [facility] with exceptions Hospital Only OR Only       Do you have a history of malignant hyperthermia?  No    (Females) Are you currently pregnant?   No     Have you been diagnosed or told you have pulmonary hypertension?   No    Do you have an LVAD?  No    Have you been told you have moderate to severe sleep apnea?  Yes. Do you use a CPAP? Yes Where is the patient located?. (RN Review required for scheduling unless scheduling in Hospital.)     Have you been told you have COPD, asthma, or any other lung disease?  Yes     What breathing problems do you have?  Asthma     Do you use home oxygen?  No    Have your breathing problems required an ED visit or hospitalization in the last year?  No.    Has your doctor ordered any cardiac tests like echo, angiogram, stress test, ablation, or EKG, that you have not completed yet?  No    Do you  have a history of any heart conditions?  Yes     Have you had any hospitalizations  in the last year for heart related issues, for example a stent placement, heart attack, or cardiomyopathy?  No    Do you have any implantable devices in your body (pacemaker, ICD)?  No    Do you take nitroglycerine?  No    Have you ever had or are you waiting for an organ transplant?  No. Continue scheduling, " "no site restrictions.    Have you had a stroke or transient ischemic attack (TIA aka \"mini stroke\") in the last 2 years?   No.    Have you been diagnosed with or been told you have cirrhosis of the liver?   No.    Are you currently on dialysis?   No    Do you need assistance transferring?   No    BMI: Estimated body mass index is 35.25 kg/m  as calculated from the following:    Height as of 1/6/25: 1.6 m (5' 3\").    Weight as of 1/6/25: 90.3 kg (199 lb).     Is patients BMI > 40 and scheduling location [facility]?  No    Do you take an injectable or oral medication for weight loss or diabetes (excluding insulin)?  Yes, hold time can be up to 7 days. Please consult with you prescribing provider to discuss endoscopy recommendations. (Please schedule at least 7 days out.)    Do you take the medication Naltrexone?  No    Do you take blood thinners?  No       Prep   Are you currently on dialysis or do you have chronic kidney disease?  No    Do you have a diagnosis of diabetes?  Yes (Golytely Prep)    Do you have a diagnosis of cystic fibrosis (CF)?  No    On a regular basis do you go 3 -5 days between bowel movements?  No    BMI > 40?  No    Preferred Pharmacy:        [id number] [address] [address]  Phone: [phone] Fax: [phone]        Final Scheduling Details     Procedure scheduled  Upper endoscopy (EGD)    Surgeon:  Syed     Date of procedure:  6/10/25     Pre-OP / PAC:   No - Not required for this site.    Location  [facility] - Per RN assessment.    Sedation   MAC/Deep Sedation - Per order.      Patient Reminders:   You will receive a call from a Nurse to review instructions and health history.  This assessment must be completed prior to your procedure.  Failure to complete the Nurse assessment may result in the procedure being cancelled.      On the day of your procedure, please designate an adult(s) who can drive you home stay with " you for the next 24 hours. The medicines used in the exam will make you sleepy. You will not be able to drive.      You cannot take public transportation, ride share services, or non-medical taxi service without a responsible caregiver.  Medical transport services are allowed with the requirement that a responsible caregiver will receive you at your destination.  We require that drivers and caregivers are confirmed prior to your procedure.

## 2025-04-15 ENCOUNTER — OFFICE VISIT (OUTPATIENT)
Dept: SURGERY | Facility: CLINIC | Age: 65
End: 2025-04-15
Payer: COMMERCIAL

## 2025-04-15 VITALS
HEIGHT: 63 IN | BODY MASS INDEX: 34.66 KG/M2 | DIASTOLIC BLOOD PRESSURE: 84 MMHG | SYSTOLIC BLOOD PRESSURE: 138 MMHG | WEIGHT: 195.6 LBS

## 2025-04-15 DIAGNOSIS — E66.01 MORBID OBESITY (H): ICD-10-CM

## 2025-04-15 DIAGNOSIS — K91.2 POSTOPERATIVE MALABSORPTION: ICD-10-CM

## 2025-04-15 DIAGNOSIS — G47.33 MODERATE OBSTRUCTIVE SLEEP APNEA: Primary | ICD-10-CM

## 2025-04-15 PROCEDURE — 99214 OFFICE O/P EST MOD 30 MIN: CPT | Performed by: FAMILY MEDICINE

## 2025-04-15 PROCEDURE — 3079F DIAST BP 80-89 MM HG: CPT | Performed by: FAMILY MEDICINE

## 2025-04-15 PROCEDURE — 3075F SYST BP GE 130 - 139MM HG: CPT | Performed by: FAMILY MEDICINE

## 2025-04-15 RX ORDER — METAXALONE 800 MG/1
800 TABLET ORAL
COMMUNITY
Start: 2025-04-02 | End: 2025-04-16

## 2025-04-15 NOTE — LETTER
4/15/2025      Yuliana Armendariz  [address]  [facility] [address]      Dear Colleague,    Thank you for referring your patient, Yuliana Armendariz, to the [facility] [place]. Please see a copy of my visit note below.    Bariatric Follow Up Visit with a History of Previous Bariatric Surgery     Date of visit: 4/15/2025  Physician: Dorothy Coronado MD, MD  Primary Care Provider:  Luan Mccray  Yuliana Armendariz   64 year old  female    Date of Surgery: 4/23/14  Initial Weight: 231#  Initial BMI: 38.9  Today's Weight:   Wt Readings from Last 1 Encounters:   04/15/25 88.7 kg (195 lb 9.6 oz)     Body mass index is 34.65 kg/m .      Assessment and Plan     Assessment: Yuliana is a 64 year old year old female who is 11 years s/p  Jose Daniel en Y Gastric Bypass with Dr. Sanchez  Yuliana Armendariz feels as if she had achieved the goals she hoped to accomplish through bariatric surgery and weight loss.    Encounter Diagnoses   Name Primary?     Postoperative malabsorption      Moderate obstructive sleep apnea Yes     Morbid obesity (H)          Current Outpatient Medications:      ADVAIR -21 mcg/actuation inhaler, [ADVAIR -21 MCG/ACTUATION INHALER] Inhale 1 puff 2 (two) times a day., Disp: , Rfl: 11     albuterol (PROVENTIL HFA;VENTOLIN HFA) 90 mcg/actuation inhaler, [ALBUTEROL (PROVENTIL HFA;VENTOLIN HFA) 90 MCG/ACTUATION INHALER] Inhale 2 puffs every 6 (six) hours as needed for wheezing., Disp: , Rfl:      albuterol (PROVENTIL) 2.5 mg /3 mL (0.083 %) nebulizer solution, [ALBUTEROL (PROVENTIL) 2.5 MG /3 ML (0.083 %) NEBULIZER SOLUTION] Take 2.5 mg by nebulization every 6 (six) hours as needed for wheezing., Disp: , Rfl:      amLODIPine (NORVASC) 5 MG tablet, [AMLODIPINE (NORVASC) 5 MG TABLET] Take 2.5 mg by mouth daily. , Disp: , Rfl:      atorvastatin (LIPITOR) 20 MG tablet, [ATORVASTATIN (LIPITOR) 20 MG TABLET] Take 20 mg by mouth bedtime., Disp: ,  Rfl:      CALCIUM CITRATE/VITAMIN D3 (CITRACAL + D PETITES ORAL), [CALCIUM CITRATE/VITAMIN D3 (CITRACAL + D PETITES ORAL)] Take 2 tablets by mouth 2 (two) times a day. , Disp: , Rfl:      cetirizine (ZYRTEC) 10 MG tablet, Take 10 mg by mouth daily., Disp: , Rfl:      cholecalciferol (VITAMIN D3) 125 mcg (5000 units) capsule, Take 125 mcg by mouth daily, Disp: , Rfl:      clotrimazole (LOTRIMIN) 1 % external cream, APPLY TOPICALLY TO THE AFFECTED AREA TWICE DAILY, Disp: 60 g, Rfl: 11     Colloidal Oatmeal 1 % CREA, Apply topically., Disp: , Rfl:      CONTOUR NEXT TEST STRIPS strips, [CONTOUR NEXT TEST STRIPS STRIPS] daily. TEST ONCE DAILY, Disp: , Rfl:      cyanocobalamin, vitamin B-12, 1,000 mcg Subl, [CYANOCOBALAMIN, VITAMIN B-12, 1,000 MCG SUBL] Place 1 tablet (1,000 mcg total) under the tongue daily., Disp: 90 tablet, Rfl: prn     fluocinonide (LIDEX) 0.05 % cream, [FLUOCINONIDE (LIDEX) 0.05 % CREAM] Apply 1 application topically 2 (two) times a day., Disp: , Rfl:      hydrocortisone 2.5 % cream, [HYDROCORTISONE 2.5 % CREAM] Apply 1 application topically as needed., Disp: , Rfl:      losartan (COZAAR) 100 MG tablet, , Disp: , Rfl:      metaxalone (SKELAXIN) 800 MG tablet, Take 800 mg by mouth., Disp: , Rfl:      methocarbamol (ROBAXIN) 500 MG tablet, Take 500 mg by mouth 3 times daily., Disp: , Rfl:      miconazole (MICATIN) 2 % cream, Place vaginally at bedtime., Disp: , Rfl:      Microlet Lancets MISC, daily., Disp: , Rfl:      montelukast (SINGULAIR) 10 mg tablet, [MONTELUKAST (SINGULAIR) 10 MG TABLET] Take 10 mg by mouth bedtime., Disp: , Rfl:      Nutritional Supplements (JACQUELIN) POWD, , Disp: , Rfl:      oxyBUTYnin (DITROPAN) 5 MG tablet, Take 5 mg by mouth., Disp: , Rfl:      pediatric multivit-iron-min (CEROVITE JR) Chew, [PEDIATRIC MULTIVIT-IRON-MIN (CEROVITE JR) CHEW] Chew 1 tablet 2 (two) times a day., Disp: 180 each, Rfl: 3     phentermine (ADIPEX-P) 37.5 MG tablet, TAKE 1/2 TO 1 (ONE-HALF TO ONE)  "TABLET BY MOUTH ONCE DAILY BEFORE BREAKFAST, Disp: 90 tablet, Rfl: 1     Propylene Glycol, PF, 0.6 % SOLN, 1 drop., Disp: , Rfl:      SF 5000 PLUS 1.1 % Crea, [SF 5000 PLUS 1.1 % CREA] Apply 1 application topically as needed., Disp: , Rfl: 0     sodium fluoride dental gel (PREVIDENT) 1.1 % GEL topical gel, BRUSH TEETH WITH GEL TWICE DAILY. DO NOT EAT OR DRINK FOR 30 MINUTES AFTER, Disp: , Rfl:      SYMBICORT 80-4.5 MCG/ACT Inhaler, INHALE 2 PUFFS BY MOUTH TWICE DAILY. RINSE MOUTH /. GARGLE AFTER USE, Disp: , Rfl:      tirzepatide-Weight Management (ZEPBOUND) 2.5 MG/0.5ML prefilled pen, Inject 0.5 mLs (2.5 mg) subcutaneously every 7 days., Disp: 2 mL, Rfl: 0     traZODone (DESYREL) 50 MG tablet, [TRAZODONE (DESYREL) 50 MG TABLET] at bedtime as needed., Disp: , Rfl:      triamcinolone (KENALOG) 0.1 % external lotion, Apply topically 2 times daily., Disp: , Rfl:      trospium (SANCTURA) 20 MG tablet, Take 20 mg by mouth 2 times daily., Disp: , Rfl:      venlafaxine (EFFEXOR-XR) 150 MG 24 hr capsule, [VENLAFAXINE (EFFEXOR-XR) 150 MG 24 HR CAPSULE] Take 1 capsule by mouth daily., Disp: , Rfl: 11     Plan:Tirzaptide as Zepbound for Moderate to Severe GIORGIO with CPAP in the setting of BMI 34.65. Continue checking blood pressures at home, if over 140/90 let Dr. Nate Cash know.     RD then next available    Bariatric Surgery Review     Interim History/LifeChanges: Weight is going up despite not eating much. Following her back surgery last year, the death of her dog, wound infection requiring prolonged hospitalization, she may have a loose screw in her lumbar fixation and has a consult for that. She has been doing pool therapy once a week. 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 Seeing a pain specialist. Checking her blood pressure at home and getting 130's systolic 80 diastolic    Patient Concerns: weight up  Appetite (1-10): upo  GERD: " yes    Medication changes:     Vitamin Intake:   B-12   SL   MVI  2/d   Vitamin D  5,000   Calcium   Petites twice daily     Other                LABS:  looked great in October. AST still a little high    Nausea no  Vomiting no  Constipation no  Diarrhea no  Rashes no  Hair Loss no  Calf tenderness no  Breathing difficulty no  Reactive Hypoglycemia no  Light Headedness no   Moods euthymic/stable    12 point ROS as above and otherwise negative      Habits:  Alcohol: no  Tobacco: no  Caffeine coffee  NSAIDS no  Exercise Routine: waling, PT pool, uses her cane sometimes,   3 meals/day fruit, yogurt, toast WW and peanut butter,  L: salad or deli meat, tuna, baked chicken, some red meat D: salad turkey taco salad  Protein first yes  ?grams/day  Water Separate from meals yes  Calorie Containing Beverages juice welches zero   Restaurant eating/wk rare  Sleeping well with trazodone  Stress low/mod  CPAP: nightly  Contraception: PM  DEXA:LBM    Social History     Social History     Socioeconomic History     Marital status: Single     Spouse name: Not on file     Number of children: Not on file     Years of education: Not on file     Highest education level: Not on file   Occupational History     Not on file   Tobacco Use     Smoking status: Former     Smokeless tobacco: Never     Tobacco comments:     quit over 25 years ago   Substance and Sexual Activity     Alcohol use: Yes     Comment: Alcoholic Drinks/day: Rarely     Drug use: No     Sexual activity: Not Currently     Partners: Male     Birth control/protection: Surgical   Other Topics Concern     Not on file   Social History Narrative    Single. Lives with her dog.  And her fiance (her children's father) on and off. On SSDI.      Social Drivers of Health     Financial Resource Strain: Not on file   Food Insecurity: No Food Insecurity (5/16/2024)    [facility]    Hunger Vital Sign      Worried About Running Out of Food in the Last Year: Never true       Ran Out of Food in the Last Year: Never true   Transportation Needs: Not on file   Physical Activity: Not on file   Stress: Not on file   Social Connections: Not on file   Interpersonal Safety: Unknown (5/28/2024)    Received from [facility]    Humiliation, Afraid, Rape, and Kick questionnaire      Fear of Current or Ex-Partner: Not on file      Emotionally Abused: Not on file      Physically Abused: No      Sexually Abused: No   Housing Stability: Not on file       Past Medical History     Past Medical History:   Diagnosis Date     Asthma      Back pain      Congestive heart failure (H)      COPD (chronic obstructive pulmonary disease) (H)      Degenerative disc disease      Diabetes type 2, controlled (H)      Dyslipidemia      Esophageal dyskinesia      GERD (gastroesophageal reflux disease)      Gout      Hepatic steatosis     nodular appearance suggesting fibrosis/cirrhosis     Hyperparathyroidism      Hyperparathyroidism, secondary      Hypertension      Low bone mass      Metabolic syndrome      Morbid obesity (H)      Osteoarthritis      Reflux      Problem List     Patient Active Problem List   Diagnosis     Congestive heart failure, unspecified HF chronicity, unspecified heart failure type (H)     Esophageal dyskinesia     Non-cardiac chest pain     Regurgitation of food     Esophageal dysphagia     Morbid obesity (H)     Elevated TSH     Elevated liver enzymes     Alkaline phosphatase elevation     Hepatic steatosis     Medications       Current Outpatient Medications:      ADVAIR -21 mcg/actuation inhaler, [ADVAIR -21 MCG/ACTUATION INHALER] Inhale 1 puff 2 (two) times a day., Disp: , Rfl: 11     albuterol (PROVENTIL HFA;VENTOLIN HFA) 90 mcg/actuation inhaler, [ALBUTEROL (PROVENTIL HFA;VENTOLIN HFA) 90 MCG/ACTUATION INHALER] Inhale 2 puffs every 6 (six) hours as needed for wheezing., Disp: , Rfl:      albuterol (PROVENTIL) 2.5 mg /3 mL (0.083 %) nebulizer solution, [ALBUTEROL (PROVENTIL)  2.5 MG /3 ML (0.083 %) NEBULIZER SOLUTION] Take 2.5 mg by nebulization every 6 (six) hours as needed for wheezing., Disp: , Rfl:      amLODIPine (NORVASC) 5 MG tablet, [AMLODIPINE (NORVASC) 5 MG TABLET] Take 2.5 mg by mouth daily. , Disp: , Rfl:      atorvastatin (LIPITOR) 20 MG tablet, [ATORVASTATIN (LIPITOR) 20 MG TABLET] Take 20 mg by mouth bedtime., Disp: , Rfl:      CALCIUM CITRATE/VITAMIN D3 (CITRACAL + D PETITES ORAL), [CALCIUM CITRATE/VITAMIN D3 (CITRACAL + D PETITES ORAL)] Take 2 tablets by mouth 2 (two) times a day. , Disp: , Rfl:      cetirizine (ZYRTEC) 10 MG tablet, Take 10 mg by mouth daily., Disp: , Rfl:      cholecalciferol (VITAMIN D3) 125 mcg (5000 units) capsule, Take 125 mcg by mouth daily, Disp: , Rfl:      clotrimazole (LOTRIMIN) 1 % external cream, APPLY TOPICALLY TO THE AFFECTED AREA TWICE DAILY, Disp: 60 g, Rfl: 11     Colloidal Oatmeal 1 % CREA, Apply topically., Disp: , Rfl:      CONTOUR NEXT TEST STRIPS strips, [CONTOUR NEXT TEST STRIPS STRIPS] daily. TEST ONCE DAILY, Disp: , Rfl:      cyanocobalamin, vitamin B-12, 1,000 mcg Subl, [CYANOCOBALAMIN, VITAMIN B-12, 1,000 MCG SUBL] Place 1 tablet (1,000 mcg total) under the tongue daily., Disp: 90 tablet, Rfl: prn     fluocinonide (LIDEX) 0.05 % cream, [FLUOCINONIDE (LIDEX) 0.05 % CREAM] Apply 1 application topically 2 (two) times a day., Disp: , Rfl:      hydrocortisone 2.5 % cream, [HYDROCORTISONE 2.5 % CREAM] Apply 1 application topically as needed., Disp: , Rfl:      losartan (COZAAR) 100 MG tablet, , Disp: , Rfl:      metaxalone (SKELAXIN) 800 MG tablet, Take 800 mg by mouth., Disp: , Rfl:      methocarbamol (ROBAXIN) 500 MG tablet, Take 500 mg by mouth 3 times daily., Disp: , Rfl:      miconazole (MICATIN) 2 % cream, Place vaginally at bedtime., Disp: , Rfl:      Microlet Lancets MISC, daily., Disp: , Rfl:      montelukast (SINGULAIR) 10 mg tablet, [MONTELUKAST (SINGULAIR) 10 MG TABLET] Take 10 mg by mouth bedtime., Disp: , Rfl:       Nutritional Supplements (JACQUELIN) POWD, , Disp: , Rfl:      oxyBUTYnin (DITROPAN) 5 MG tablet, Take 5 mg by mouth., Disp: , Rfl:      pediatric multivit-iron-min (CEROVITE JR) Chew, [PEDIATRIC MULTIVIT-IRON-MIN (CEROVITE JR) CHEW] Chew 1 tablet 2 (two) times a day., Disp: 180 each, Rfl: 3     phentermine (ADIPEX-P) 37.5 MG tablet, TAKE 1/2 TO 1 (ONE-HALF TO ONE) TABLET BY MOUTH ONCE DAILY BEFORE BREAKFAST, Disp: 90 tablet, Rfl: 1     Propylene Glycol, PF, 0.6 % SOLN, 1 drop., Disp: , Rfl:      SF 5000 PLUS 1.1 % Crea, [SF 5000 PLUS 1.1 % CREA] Apply 1 application topically as needed., Disp: , Rfl: 0     sodium fluoride dental gel (PREVIDENT) 1.1 % GEL topical gel, BRUSH TEETH WITH GEL TWICE DAILY. DO NOT EAT OR DRINK FOR 30 MINUTES AFTER, Disp: , Rfl:      SYMBICORT 80-4.5 MCG/ACT Inhaler, INHALE 2 PUFFS BY MOUTH TWICE DAILY. RINSE MOUTH /. GARGLE AFTER USE, Disp: , Rfl:      tirzepatide-Weight Management (ZEPBOUND) 2.5 MG/0.5ML prefilled pen, Inject 0.5 mLs (2.5 mg) subcutaneously every 7 days., Disp: 2 mL, Rfl: 0     traZODone (DESYREL) 50 MG tablet, [TRAZODONE (DESYREL) 50 MG TABLET] at bedtime as needed., Disp: , Rfl:      triamcinolone (KENALOG) 0.1 % external lotion, Apply topically 2 times daily., Disp: , Rfl:      trospium (SANCTURA) 20 MG tablet, Take 20 mg by mouth 2 times daily., Disp: , Rfl:      venlafaxine (EFFEXOR-XR) 150 MG 24 hr capsule, [VENLAFAXINE (EFFEXOR-XR) 150 MG 24 HR CAPSULE] Take 1 capsule by mouth daily., Disp: , Rfl: 11   Surgical History     Past Surgical History  She has a past surgical history that includes Revision Jose Daniel-En-Y (4/23/2014); Cholecystectomy; Hysterectomy; tubal ligation; Total Knee Arthroplasty (Left, 03/2019); Arthroscopy knee with meniscal repair (Left, 01/2019); Esophagoscopy, gastroscopy, duodenoscopy (EGD), combined (N/A, 4/26/2022); Esophageal Balloon Provocation Study (N/A, 4/26/2022); Esophagogastroduodenoscopy, With Botulinum Toxin Injection (4/26/2022); and IR  "Lumbar Puncture (11/17/2023).    Objective-Exam     Constitutional:  /84 (BP Location: Right arm)   Ht 1.6 m (5' 3\")   Wt 88.7 kg (195 lb 9.6 oz)   BMI 34.65 kg/m      General:  Pleasant and in no acute distress   Eyes:  EOMI  ENT:  Airway 2+  Moist mucous membranes  Neck:  Supple,  Respiratory: Normal respiratory effort, no cough, wheezes or crackles  CV:  Regular rate and Rhythm,2/6 murmur, pulses 2+, no calf tenderness, minimal LE edema  Gastrointestinal: Abdomen NT/ND, BS+  Musculoskeletal: muscle mass WNL  Skin: color brown hair full, colorful, incisions nicely healed  Neurological: No tremor, normal gait  Psychiatric: alert and oriented X3, mood and affect normal    Counseling     We reviewed the important post op bariatric recommendations:  -eating 3 meals daily  -eating protein first, getting >60gm protein daily  -eating slowly, chewing food well  -avoiding/limiting calorie containing beverages  -drinking water 15-30 minutes before or after meals  -choosing wheat, not white with breads, crackers, pastas, micheal, bagels, tortillas, rice  -limiting restaurant or cafeteria eating to twice a week or less    We discussed the importance of restorative sleep and stress management in maintaining a healthy weight.  We discussed the National Weight Control Registry healthy weight maintenance strategies and ways to optimize metabolism.  We discussed the importance of physical activity including cardiovascular and strength training in maintaining a healthier weight.    We discussed the importance of life-long vitamin supplementation and life-long  follow-up.    Yuliana was reminded that, to avoid marginal ulcers she should avoid tobacco at all, alcohol in excess, caffeine in excess, and NSAIDS (unless indicated for cardioprotection or othewise and opposed by a PPI).    Dorothy Coronado MD, MD, FAAFP  [facility].  4/15/2025  11:06 AM      No images are attached to the encounter.  Total " time spent on the date of this encounter doing: chart review, review of test results, patient visit, physical exam, education, counseling, developing plan of care, and documenting = 30 minutes.      Again, thank you for allowing me to participate in the care of your patient.        Sincerely,        Dorothy Coronado MD    Electronically signed

## 2025-04-15 NOTE — PROGRESS NOTES
Bariatric Follow Up Visit with a History of Previous Bariatric Surgery     Date of visit: 4/15/2025  Physician: Dorothy Coronado MD, MD  Primary Care Provider:  Luan Mccray SATHISH Armendariz   64 year old  female    Date of Surgery: 4/23/14  Initial Weight: 231#  Initial BMI: 38.9  Today's Weight:   Wt Readings from Last 1 Encounters:   04/15/25 88.7 kg (195 lb 9.6 oz)     Body mass index is 34.65 kg/m .      Assessment and Plan     Assessment: Yuliana is a 64 year old year old female who is 11 years s/p  Jose Daniel en Y Gastric Bypass with Dr. Laura Armendariz feels as if she had achieved the goals she hoped to accomplish through bariatric surgery and weight loss.    Encounter Diagnoses   Name Primary?    Postoperative malabsorption     Moderate obstructive sleep apnea Yes    Morbid obesity (H)          Current Outpatient Medications:     ADVAIR -21 mcg/actuation inhaler, [ADVAIR -21 MCG/ACTUATION INHALER] Inhale 1 puff 2 (two) times a day., Disp: , Rfl: 11    albuterol (PROVENTIL HFA;VENTOLIN HFA) 90 mcg/actuation inhaler, [ALBUTEROL (PROVENTIL HFA;VENTOLIN HFA) 90 MCG/ACTUATION INHALER] Inhale 2 puffs every 6 (six) hours as needed for wheezing., Disp: , Rfl:     albuterol (PROVENTIL) 2.5 mg /3 mL (0.083 %) nebulizer solution, [ALBUTEROL (PROVENTIL) 2.5 MG /3 ML (0.083 %) NEBULIZER SOLUTION] Take 2.5 mg by nebulization every 6 (six) hours as needed for wheezing., Disp: , Rfl:     amLODIPine (NORVASC) 5 MG tablet, [AMLODIPINE (NORVASC) 5 MG TABLET] Take 2.5 mg by mouth daily. , Disp: , Rfl:     atorvastatin (LIPITOR) 20 MG tablet, [ATORVASTATIN (LIPITOR) 20 MG TABLET] Take 20 mg by mouth bedtime., Disp: , Rfl:     CALCIUM CITRATE/VITAMIN D3 (CITRACAL + D PETITES ORAL), [CALCIUM CITRATE/VITAMIN D3 (CITRACAL + D PETITES ORAL)] Take 2 tablets by mouth 2 (two) times a day. , Disp: , Rfl:     cetirizine (ZYRTEC) 10 MG tablet, Take 10 mg by mouth daily., Disp: , Rfl:     cholecalciferol  (VITAMIN D3) 125 mcg (5000 units) capsule, Take 125 mcg by mouth daily, Disp: , Rfl:     clotrimazole (LOTRIMIN) 1 % external cream, APPLY TOPICALLY TO THE AFFECTED AREA TWICE DAILY, Disp: 60 g, Rfl: 11    Colloidal Oatmeal 1 % CREA, Apply topically., Disp: , Rfl:     CONTOUR NEXT TEST STRIPS strips, [CONTOUR NEXT TEST STRIPS STRIPS] daily. TEST ONCE DAILY, Disp: , Rfl:     cyanocobalamin, vitamin B-12, 1,000 mcg Subl, [CYANOCOBALAMIN, VITAMIN B-12, 1,000 MCG SUBL] Place 1 tablet (1,000 mcg total) under the tongue daily., Disp: 90 tablet, Rfl: prn    fluocinonide (LIDEX) 0.05 % cream, [FLUOCINONIDE (LIDEX) 0.05 % CREAM] Apply 1 application topically 2 (two) times a day., Disp: , Rfl:     hydrocortisone 2.5 % cream, [HYDROCORTISONE 2.5 % CREAM] Apply 1 application topically as needed., Disp: , Rfl:     losartan (COZAAR) 100 MG tablet, , Disp: , Rfl:     metaxalone (SKELAXIN) 800 MG tablet, Take 800 mg by mouth., Disp: , Rfl:     methocarbamol (ROBAXIN) 500 MG tablet, Take 500 mg by mouth 3 times daily., Disp: , Rfl:     miconazole (MICATIN) 2 % cream, Place vaginally at bedtime., Disp: , Rfl:     Microlet Lancets MISC, daily., Disp: , Rfl:     montelukast (SINGULAIR) 10 mg tablet, [MONTELUKAST (SINGULAIR) 10 MG TABLET] Take 10 mg by mouth bedtime., Disp: , Rfl:     Nutritional Supplements (JACQUELIN) POWD, , Disp: , Rfl:     oxyBUTYnin (DITROPAN) 5 MG tablet, Take 5 mg by mouth., Disp: , Rfl:     pediatric multivit-iron-min (CEROVITE JR) Chew, [PEDIATRIC MULTIVIT-IRON-MIN (CEROVITE JR) CHEW] Chew 1 tablet 2 (two) times a day., Disp: 180 each, Rfl: 3    phentermine (ADIPEX-P) 37.5 MG tablet, TAKE 1/2 TO 1 (ONE-HALF TO ONE) TABLET BY MOUTH ONCE DAILY BEFORE BREAKFAST, Disp: 90 tablet, Rfl: 1    Propylene Glycol, PF, 0.6 % SOLN, 1 drop., Disp: , Rfl:     SF 5000 PLUS 1.1 % Crea, [SF 5000 PLUS 1.1 % CREA] Apply 1 application topically as needed., Disp: , Rfl: 0    sodium fluoride dental gel (PREVIDENT) 1.1 % GEL topical gel,  "BRUSH TEETH WITH GEL TWICE DAILY. DO NOT EAT OR DRINK FOR 30 MINUTES AFTER, Disp: , Rfl:     SYMBICORT 80-4.5 MCG/ACT Inhaler, INHALE 2 PUFFS BY MOUTH TWICE DAILY. RINSE MOUTH /. GARGLE AFTER USE, Disp: , Rfl:     tirzepatide-Weight Management (ZEPBOUND) 2.5 MG/0.5ML prefilled pen, Inject 0.5 mLs (2.5 mg) subcutaneously every 7 days., Disp: 2 mL, Rfl: 0    traZODone (DESYREL) 50 MG tablet, [TRAZODONE (DESYREL) 50 MG TABLET] at bedtime as needed., Disp: , Rfl:     triamcinolone (KENALOG) 0.1 % external lotion, Apply topically 2 times daily., Disp: , Rfl:     trospium (SANCTURA) 20 MG tablet, Take 20 mg by mouth 2 times daily., Disp: , Rfl:     venlafaxine (EFFEXOR-XR) 150 MG 24 hr capsule, [VENLAFAXINE (EFFEXOR-XR) 150 MG 24 HR CAPSULE] Take 1 capsule by mouth daily., Disp: , Rfl: 11     Plan:Tirzaptide as Zepbound for Moderate to Severe GIORGIO with CPAP in the setting of BMI 34.65. Continue checking blood pressures at home, if over 140/90 let Dr. Nate Cash know.     RD then next available    Bariatric Surgery Review     Interim History/LifeChanges: Weight is going up despite not eating much. Following her back surgery last year, the death of her dog, wound infection requiring prolonged hospitalization, she may have a loose screw in her lumbar fixation and has a consult for that. She has been doing pool therapy once a week. 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 Seeing a pain specialist. Checking her blood pressure at home and getting 130's systolic 80 diastolic    Patient Concerns: weight up  Appetite (1-10): upo  GERD: yes    Medication changes:     Vitamin Intake:   B-12   SL   MVI  2/d   Vitamin D  5,000   Calcium   Petites twice daily     Other                LABS:  looked great in October. AST still a little high    Nausea no  Vomiting no  Constipation no  Diarrhea no  Rashes no  Hair Loss no  Calf tenderness no  Breathing difficulty " no  Reactive Hypoglycemia no  Light Headedness no   Moods euthymic/stable    12 point ROS as above and otherwise negative      Habits:  Alcohol: no  Tobacco: no  Caffeine coffee  NSAIDS no  Exercise Routine: waling, PT pool, uses her cane sometimes,   3 meals/day fruit, yogurt, toast WW and peanut butter,  L: salad or deli meat, tuna, baked chicken, some red meat D: salad turkey taco salad  Protein first yes  ?grams/day  Water Separate from meals yes  Calorie Containing Beverages juice welches zero   Restaurant eating/wk rare  Sleeping well with trazodone  Stress low/mod  CPAP: nightly  Contraception: PM  DEXA:LBM    Social History     Social History     Socioeconomic History    Marital status: Single     Spouse name: Not on file    Number of children: Not on file    Years of education: Not on file    Highest education level: Not on file   Occupational History    Not on file   Tobacco Use    Smoking status: Former    Smokeless tobacco: Never    Tobacco comments:     quit over 25 years ago   Substance and Sexual Activity    Alcohol use: Yes     Comment: Alcoholic Drinks/day: Rarely    Drug use: No    Sexual activity: Not Currently     Partners: Male     Birth control/protection: Surgical   Other Topics Concern    Not on file   Social History Narrative    Single. Lives with her dog.  And her fiance (her children's father) on and off. On SSDI.      Social Drivers of Health     Financial Resource Strain: Not on file   Food Insecurity: No Food Insecurity (5/16/2024)    Received from [employer]    Hunger Vital Sign     Worried About Running Out of Food in the Last Year: Never true     Ran Out of Food in the Last Year: Never true   Transportation Needs: Not on file   Physical Activity: Not on file   Stress: Not on file   Social Connections: Not on file   Interpersonal Safety: Unknown (5/28/2024)    Received from [facility]    Humiliation, Afraid, Rape, and Kick questionnaire     Fear of Current or Ex-Partner: Not  on file     Emotionally Abused: Not on file     Physically Abused: No     Sexually Abused: No   Housing Stability: Not on file       Past Medical History     Past Medical History:   Diagnosis Date    Asthma     Back pain     Congestive heart failure (H)     COPD (chronic obstructive pulmonary disease) (H)     Degenerative disc disease     Diabetes type 2, controlled (H)     Dyslipidemia     Esophageal dyskinesia     GERD (gastroesophageal reflux disease)     Gout     Hepatic steatosis     nodular appearance suggesting fibrosis/cirrhosis    Hyperparathyroidism     Hyperparathyroidism, secondary     Hypertension     Low bone mass     Metabolic syndrome     Morbid obesity (H)     Osteoarthritis     Reflux      Problem List     Patient Active Problem List   Diagnosis    Congestive heart failure, unspecified HF chronicity, unspecified heart failure type (H)    Esophageal dyskinesia    Non-cardiac chest pain    Regurgitation of food    Esophageal dysphagia    Morbid obesity (H)    Elevated TSH    Elevated liver enzymes    Alkaline phosphatase elevation    Hepatic steatosis     Medications       Current Outpatient Medications:     ADVAIR -21 mcg/actuation inhaler, [ADVAIR -21 MCG/ACTUATION INHALER] Inhale 1 puff 2 (two) times a day., Disp: , Rfl: 11    albuterol (PROVENTIL HFA;VENTOLIN HFA) 90 mcg/actuation inhaler, [ALBUTEROL (PROVENTIL HFA;VENTOLIN HFA) 90 MCG/ACTUATION INHALER] Inhale 2 puffs every 6 (six) hours as needed for wheezing., Disp: , Rfl:     albuterol (PROVENTIL) 2.5 mg /3 mL (0.083 %) nebulizer solution, [ALBUTEROL (PROVENTIL) 2.5 MG /3 ML (0.083 %) NEBULIZER SOLUTION] Take 2.5 mg by nebulization every 6 (six) hours as needed for wheezing., Disp: , Rfl:     amLODIPine (NORVASC) 5 MG tablet, [AMLODIPINE (NORVASC) 5 MG TABLET] Take 2.5 mg by mouth daily. , Disp: , Rfl:     atorvastatin (LIPITOR) 20 MG tablet, [ATORVASTATIN (LIPITOR) 20 MG TABLET] Take 20 mg by mouth bedtime., Disp: , Rfl:      CALCIUM CITRATE/VITAMIN D3 (CITRACAL + D PETITES ORAL), [CALCIUM CITRATE/VITAMIN D3 (CITRACAL + D PETITES ORAL)] Take 2 tablets by mouth 2 (two) times a day. , Disp: , Rfl:     cetirizine (ZYRTEC) 10 MG tablet, Take 10 mg by mouth daily., Disp: , Rfl:     cholecalciferol (VITAMIN D3) 125 mcg (5000 units) capsule, Take 125 mcg by mouth daily, Disp: , Rfl:     clotrimazole (LOTRIMIN) 1 % external cream, APPLY TOPICALLY TO THE AFFECTED AREA TWICE DAILY, Disp: 60 g, Rfl: 11    Colloidal Oatmeal 1 % CREA, Apply topically., Disp: , Rfl:     CONTOUR NEXT TEST STRIPS strips, [CONTOUR NEXT TEST STRIPS STRIPS] daily. TEST ONCE DAILY, Disp: , Rfl:     cyanocobalamin, vitamin B-12, 1,000 mcg Subl, [CYANOCOBALAMIN, VITAMIN B-12, 1,000 MCG SUBL] Place 1 tablet (1,000 mcg total) under the tongue daily., Disp: 90 tablet, Rfl: prn    fluocinonide (LIDEX) 0.05 % cream, [FLUOCINONIDE (LIDEX) 0.05 % CREAM] Apply 1 application topically 2 (two) times a day., Disp: , Rfl:     hydrocortisone 2.5 % cream, [HYDROCORTISONE 2.5 % CREAM] Apply 1 application topically as needed., Disp: , Rfl:     losartan (COZAAR) 100 MG tablet, , Disp: , Rfl:     metaxalone (SKELAXIN) 800 MG tablet, Take 800 mg by mouth., Disp: , Rfl:     methocarbamol (ROBAXIN) 500 MG tablet, Take 500 mg by mouth 3 times daily., Disp: , Rfl:     miconazole (MICATIN) 2 % cream, Place vaginally at bedtime., Disp: , Rfl:     Microlet Lancets MISC, daily., Disp: , Rfl:     montelukast (SINGULAIR) 10 mg tablet, [MONTELUKAST (SINGULAIR) 10 MG TABLET] Take 10 mg by mouth bedtime., Disp: , Rfl:     Nutritional Supplements (JACQUELIN) POWD, , Disp: , Rfl:     oxyBUTYnin (DITROPAN) 5 MG tablet, Take 5 mg by mouth., Disp: , Rfl:     pediatric multivit-iron-min (CEROVITE JR) Chew, [PEDIATRIC MULTIVIT-IRON-MIN (CEROVITE JR) CHEW] Chew 1 tablet 2 (two) times a day., Disp: 180 each, Rfl: 3    phentermine (ADIPEX-P) 37.5 MG tablet, TAKE 1/2 TO 1 (ONE-HALF TO ONE) TABLET BY MOUTH ONCE DAILY  "BEFORE BREAKFAST, Disp: 90 tablet, Rfl: 1    Propylene Glycol, PF, 0.6 % SOLN, 1 drop., Disp: , Rfl:     SF 5000 PLUS 1.1 % Crea, [SF 5000 PLUS 1.1 % CREA] Apply 1 application topically as needed., Disp: , Rfl: 0    sodium fluoride dental gel (PREVIDENT) 1.1 % GEL topical gel, BRUSH TEETH WITH GEL TWICE DAILY. DO NOT EAT OR DRINK FOR 30 MINUTES AFTER, Disp: , Rfl:     SYMBICORT 80-4.5 MCG/ACT Inhaler, INHALE 2 PUFFS BY MOUTH TWICE DAILY. RINSE MOUTH /. GARGLE AFTER USE, Disp: , Rfl:     tirzepatide-Weight Management (ZEPBOUND) 2.5 MG/0.5ML prefilled pen, Inject 0.5 mLs (2.5 mg) subcutaneously every 7 days., Disp: 2 mL, Rfl: 0    traZODone (DESYREL) 50 MG tablet, [TRAZODONE (DESYREL) 50 MG TABLET] at bedtime as needed., Disp: , Rfl:     triamcinolone (KENALOG) 0.1 % external lotion, Apply topically 2 times daily., Disp: , Rfl:     trospium (SANCTURA) 20 MG tablet, Take 20 mg by mouth 2 times daily., Disp: , Rfl:     venlafaxine (EFFEXOR-XR) 150 MG 24 hr capsule, [VENLAFAXINE (EFFEXOR-XR) 150 MG 24 HR CAPSULE] Take 1 capsule by mouth daily., Disp: , Rfl: 11   Surgical History     Past Surgical History  She has a past surgical history that includes Revision Jose Daniel-En-Y (4/23/2014); Cholecystectomy; Hysterectomy; tubal ligation; Total Knee Arthroplasty (Left, 03/2019); Arthroscopy knee with meniscal repair (Left, 01/2019); Esophagoscopy, gastroscopy, duodenoscopy (EGD), combined (N/A, 4/26/2022); Esophageal Balloon Provocation Study (N/A, 4/26/2022); Esophagogastroduodenoscopy, With Botulinum Toxin Injection (4/26/2022); and IR Lumbar Puncture (11/17/2023).    Objective-Exam     Constitutional:  /84 (BP Location: Right arm)   Ht 1.6 m (5' 3\")   Wt 88.7 kg (195 lb 9.6 oz)   BMI 34.65 kg/m      General:  Pleasant and in no acute distress   Eyes:  EOMI  ENT:  Airway 2+  Moist mucous membranes  Neck:  Supple,  Respiratory: Normal respiratory effort, no cough, wheezes or crackles  CV:  Regular rate and Rhythm,2/6 " murmur, pulses 2+, no calf tenderness, minimal LE edema  Gastrointestinal: Abdomen NT/ND, BS+  Musculoskeletal: muscle mass WNL  Skin: color brown hair full, colorful, incisions nicely healed  Neurological: No tremor, normal gait  Psychiatric: alert and oriented X3, mood and affect normal    Counseling     We reviewed the important post op bariatric recommendations:  -eating 3 meals daily  -eating protein first, getting >60gm protein daily  -eating slowly, chewing food well  -avoiding/limiting calorie containing beverages  -drinking water 15-30 minutes before or after meals  -choosing wheat, not white with breads, crackers, pastas, micheal, bagels, tortillas, rice  -limiting restaurant or cafeteria eating to twice a week or less    We discussed the importance of restorative sleep and stress management in maintaining a healthy weight.  We discussed the National Weight Control Registry healthy weight maintenance strategies and ways to optimize metabolism.  We discussed the importance of physical activity including cardiovascular and strength training in maintaining a healthier weight.    We discussed the importance of life-long vitamin supplementation and life-long  follow-up.    Yuliana was reminded that, to avoid marginal ulcers she should avoid tobacco at all, alcohol in excess, caffeine in excess, and NSAIDS (unless indicated for cardioprotection or othewise and opposed by a PPI).    Dorothy Coronado MD, MD, [facility].  4/15/2025  11:06 AM      No images are attached to the encounter.  Total time spent on the date of this encounter doing: chart review, review of test results, patient visit, physical exam, education, counseling, developing plan of care, and documenting = 30 minutes.

## 2025-04-16 ENCOUNTER — TELEPHONE (OUTPATIENT)
Dept: SURGERY | Facility: CLINIC | Age: 65
End: 2025-04-16
Payer: COMMERCIAL

## 2025-04-16 NOTE — TELEPHONE ENCOUNTER
Patient called and was checking in on the prior authorization and I assured her that it has been sent to our PA team and they will submit it as soon as they are able to.  She also wanted Dr. Coronado to know that she was told to stop the metaxalone by her other provider.    Cecelia Villagomez RN

## 2025-05-05 DIAGNOSIS — G47.33 MODERATE OBSTRUCTIVE SLEEP APNEA: ICD-10-CM

## 2025-05-05 DIAGNOSIS — E66.01 MORBID OBESITY (H): Primary | ICD-10-CM

## 2025-05-05 NOTE — TELEPHONE ENCOUNTER
Patient tolerating the Zepbound 2.5 mg dose and she will be ready to increase to the 5 mg in a few weeks.  Will send in the next dose.    Cecelia Villagomez RN

## 2025-06-09 ENCOUNTER — ANESTHESIA EVENT (OUTPATIENT)
Dept: GASTROENTEROLOGY | Facility: CLINIC | Age: 65
End: 2025-06-09
Payer: COMMERCIAL

## 2025-06-10 ENCOUNTER — HOSPITAL ENCOUNTER (OUTPATIENT)
Facility: CLINIC | Age: 65
Discharge: HOME | End: 2025-06-10
Attending: INTERNAL MEDICINE | Admitting: INTERNAL MEDICINE
Payer: COMMERCIAL

## 2025-06-10 ENCOUNTER — ANESTHESIA (OUTPATIENT)
Dept: GASTROENTEROLOGY | Facility: CLINIC | Age: 65
End: 2025-06-10
Payer: COMMERCIAL

## 2025-06-10 VITALS
OXYGEN SATURATION: 100 % | DIASTOLIC BLOOD PRESSURE: 79 MMHG | HEART RATE: 82 BPM | SYSTOLIC BLOOD PRESSURE: 128 MMHG | RESPIRATION RATE: 16 BRPM

## 2025-06-10 LAB
GLUCOSE BLDC GLUCOMTR-MCNC: 103 MG/DL (ref 70–99)
UPPER GI ENDOSCOPY: NORMAL

## 2025-06-10 PROCEDURE — 82962 GLUCOSE BLOOD TEST: CPT

## 2025-06-10 PROCEDURE — 43249 ESOPH EGD DILATION <30 MM: CPT | Performed by: INTERNAL MEDICINE

## 2025-06-10 PROCEDURE — 250N000020 HC RX IP 250 OP 636 J0585: Mod: JZ | Performed by: INTERNAL MEDICINE

## 2025-06-10 PROCEDURE — 370N000017 HC ANESTHESIA TECHNICAL FEE, PER MIN: Performed by: INTERNAL MEDICINE

## 2025-06-10 PROCEDURE — 250N000011 HC RX IP 250 OP 636: Performed by: NURSE ANESTHETIST, CERTIFIED REGISTERED

## 2025-06-10 PROCEDURE — 43236 UPPR GI SCOPE W/SUBMUC INJ: CPT | Performed by: INTERNAL MEDICINE

## 2025-06-10 PROCEDURE — 250N000009 HC RX 250: Performed by: NURSE ANESTHETIST, CERTIFIED REGISTERED

## 2025-06-10 PROCEDURE — 258N000003 HC RX IP 258 OP 636: Performed by: NURSE ANESTHETIST, CERTIFIED REGISTERED

## 2025-06-10 RX ORDER — ONDANSETRON 4 MG/1
4 TABLET, ORALLY DISINTEGRATING ORAL EVERY 6 HOURS PRN
Status: CANCELLED | OUTPATIENT
Start: 2025-06-10

## 2025-06-10 RX ORDER — NALOXONE HYDROCHLORIDE 0.4 MG/ML
0.1 INJECTION, SOLUTION INTRAMUSCULAR; INTRAVENOUS; SUBCUTANEOUS
Status: DISCONTINUED | OUTPATIENT
Start: 2025-06-10 | End: 2025-06-10 | Stop reason: HOSPADM

## 2025-06-10 RX ORDER — SODIUM CHLORIDE, SODIUM LACTATE, POTASSIUM CHLORIDE, CALCIUM CHLORIDE 600; 310; 30; 20 MG/100ML; MG/100ML; MG/100ML; MG/100ML
INJECTION, SOLUTION INTRAVENOUS CONTINUOUS PRN
Status: DISCONTINUED | OUTPATIENT
Start: 2025-06-10 | End: 2025-06-10

## 2025-06-10 RX ORDER — LIDOCAINE 40 MG/G
CREAM TOPICAL
Status: DISCONTINUED | OUTPATIENT
Start: 2025-06-10 | End: 2025-06-10 | Stop reason: HOSPADM

## 2025-06-10 RX ORDER — ONDANSETRON 4 MG/1
4 TABLET, ORALLY DISINTEGRATING ORAL EVERY 30 MIN PRN
Status: DISCONTINUED | OUTPATIENT
Start: 2025-06-10 | End: 2025-06-10 | Stop reason: HOSPADM

## 2025-06-10 RX ORDER — DEXAMETHASONE SODIUM PHOSPHATE 4 MG/ML
4 INJECTION, SOLUTION INTRA-ARTICULAR; INTRALESIONAL; INTRAMUSCULAR; INTRAVENOUS; SOFT TISSUE
Status: DISCONTINUED | OUTPATIENT
Start: 2025-06-10 | End: 2025-06-10 | Stop reason: HOSPADM

## 2025-06-10 RX ORDER — PROPOFOL 10 MG/ML
INJECTION, EMULSION INTRAVENOUS PRN
Status: DISCONTINUED | OUTPATIENT
Start: 2025-06-10 | End: 2025-06-10

## 2025-06-10 RX ORDER — OXYCODONE HYDROCHLORIDE 5 MG/1
5 TABLET ORAL
Status: DISCONTINUED | OUTPATIENT
Start: 2025-06-10 | End: 2025-06-10 | Stop reason: HOSPADM

## 2025-06-10 RX ORDER — SODIUM CHLORIDE, SODIUM LACTATE, POTASSIUM CHLORIDE, CALCIUM CHLORIDE 600; 310; 30; 20 MG/100ML; MG/100ML; MG/100ML; MG/100ML
INJECTION, SOLUTION INTRAVENOUS CONTINUOUS
Status: DISCONTINUED | OUTPATIENT
Start: 2025-06-10 | End: 2025-06-10 | Stop reason: HOSPADM

## 2025-06-10 RX ORDER — FLUMAZENIL 0.1 MG/ML
0.2 INJECTION, SOLUTION INTRAVENOUS
Status: CANCELLED | OUTPATIENT
Start: 2025-06-10 | End: 2025-06-10

## 2025-06-10 RX ORDER — NALOXONE HYDROCHLORIDE 0.4 MG/ML
0.2 INJECTION, SOLUTION INTRAMUSCULAR; INTRAVENOUS; SUBCUTANEOUS
Status: CANCELLED | OUTPATIENT
Start: 2025-06-10

## 2025-06-10 RX ORDER — LIDOCAINE HYDROCHLORIDE 20 MG/ML
INJECTION, SOLUTION INFILTRATION; PERINEURAL PRN
Status: DISCONTINUED | OUTPATIENT
Start: 2025-06-10 | End: 2025-06-10

## 2025-06-10 RX ORDER — OXYCODONE HYDROCHLORIDE 10 MG/1
10 TABLET ORAL
Status: DISCONTINUED | OUTPATIENT
Start: 2025-06-10 | End: 2025-06-10 | Stop reason: HOSPADM

## 2025-06-10 RX ORDER — PROCHLORPERAZINE MALEATE 10 MG
10 TABLET ORAL EVERY 6 HOURS PRN
Status: CANCELLED | OUTPATIENT
Start: 2025-06-10

## 2025-06-10 RX ORDER — NALOXONE HYDROCHLORIDE 0.4 MG/ML
0.4 INJECTION, SOLUTION INTRAMUSCULAR; INTRAVENOUS; SUBCUTANEOUS
Status: CANCELLED | OUTPATIENT
Start: 2025-06-10

## 2025-06-10 RX ORDER — PROPOFOL 10 MG/ML
INJECTION, EMULSION INTRAVENOUS CONTINUOUS PRN
Status: DISCONTINUED | OUTPATIENT
Start: 2025-06-10 | End: 2025-06-10

## 2025-06-10 RX ORDER — ONDANSETRON 2 MG/ML
4 INJECTION INTRAMUSCULAR; INTRAVENOUS
Status: DISCONTINUED | OUTPATIENT
Start: 2025-06-10 | End: 2025-06-10 | Stop reason: HOSPADM

## 2025-06-10 RX ORDER — ONDANSETRON 2 MG/ML
4 INJECTION INTRAMUSCULAR; INTRAVENOUS EVERY 30 MIN PRN
Status: DISCONTINUED | OUTPATIENT
Start: 2025-06-10 | End: 2025-06-10 | Stop reason: HOSPADM

## 2025-06-10 RX ORDER — ONDANSETRON 2 MG/ML
4 INJECTION INTRAMUSCULAR; INTRAVENOUS EVERY 6 HOURS PRN
Status: CANCELLED | OUTPATIENT
Start: 2025-06-10

## 2025-06-10 RX ADMIN — PROPOFOL 20 MG: 10 INJECTION, EMULSION INTRAVENOUS at 11:35

## 2025-06-10 RX ADMIN — PROPOFOL 40 MG: 10 INJECTION, EMULSION INTRAVENOUS at 11:36

## 2025-06-10 RX ADMIN — PROPOFOL 60 MG: 10 INJECTION, EMULSION INTRAVENOUS at 11:33

## 2025-06-10 RX ADMIN — SODIUM CHLORIDE, SODIUM LACTATE, POTASSIUM CHLORIDE, AND CALCIUM CHLORIDE: .6; .31; .03; .02 INJECTION, SOLUTION INTRAVENOUS at 11:26

## 2025-06-10 RX ADMIN — LIDOCAINE HYDROCHLORIDE 40 MG: 20 INJECTION, SOLUTION INFILTRATION; PERINEURAL at 11:31

## 2025-06-10 RX ADMIN — PROPOFOL 20 MG: 10 INJECTION, EMULSION INTRAVENOUS at 11:32

## 2025-06-10 RX ADMIN — PROPOFOL 40 MG: 10 INJECTION, EMULSION INTRAVENOUS at 11:51

## 2025-06-10 RX ADMIN — PROPOFOL 150 MCG/KG/MIN: 10 INJECTION, EMULSION INTRAVENOUS at 11:32

## 2025-06-10 RX ADMIN — ONABOTULINUMTOXINA 100 UNITS: 100 INJECTION, POWDER, LYOPHILIZED, FOR SOLUTION INTRADERMAL; INTRAMUSCULAR at 11:55

## 2025-06-10 ASSESSMENT — ACTIVITIES OF DAILY LIVING (ADL)
ADLS_ACUITY_SCORE: 41

## 2025-06-10 NOTE — ANESTHESIA POSTPROCEDURE EVALUATION
Patient: Yuliana Armendariz    Procedure: Procedure(s):  Esophagogastroduodenoscopy, With Botulinum Toxin Injection  ESOPHAGOGASTRODUODENOSCOPY, WITH BALLOON DILATION OF LESS THAN 30 MILLIMETERS       Anesthesia Type:  MAC    Note:  Disposition: Outpatient   Postop Pain Control: Uneventful            Sign Out: Well controlled pain   PONV: No   Neuro/Psych: Uneventful            Sign Out: Acceptable/Baseline neuro status   Airway/Respiratory: Uneventful            Sign Out: Acceptable/Baseline resp. status   CV/Hemodynamics: Uneventful            Sign Out: Acceptable CV status; No obvious hypovolemia; No obvious fluid overload   Other NRE: NONE   DID A NON-ROUTINE EVENT OCCUR? No           Last vitals:  Vitals Value Taken Time   /79 06/10/25 12:10   Temp     Pulse 82 06/10/25 12:04   Resp 16 06/10/25 12:04   SpO2 97 % 06/10/25 12:29   Vitals shown include unfiled device data.    Electronically Signed By: Vick Rehman MD  Katie 10, 2025  1:13 PM

## 2025-06-10 NOTE — ANESTHESIA PREPROCEDURE EVALUATION
Anesthesia Pre-Procedure Evaluation    Patient: Yuliana Armendariz   MRN: [id number] : 1960          Procedure : Procedure(s):  Esophagoscopy, gastroscopy, duodenoscopy (EGD), combined         Past Medical History:   Diagnosis Date    Asthma     Back pain     Congestive heart failure (H)     COPD (chronic obstructive pulmonary disease) (H)     Degenerative disc disease     Diabetes type 2, controlled (H)     Dyslipidemia     Esophageal dyskinesia     GERD (gastroesophageal reflux disease)     Gout     Hepatic steatosis     nodular appearance suggesting fibrosis/cirrhosis    Hyperparathyroidism     Hyperparathyroidism, secondary     Hypertension     Low bone mass     Metabolic syndrome     Morbid obesity (H)     Osteoarthritis     Reflux       Past Surgical History:   Procedure Laterality Date    ARTHROSCOPY KNEE WITH MENISCAL REPAIR Left 2019    CHOLECYSTECTOMY      ESOPHAGEAL BALLOON PROVOCATION STUDY N/A 2022    Procedure: ESOPHAGEAL BALLOON PROVOCATION STUDY;  Surgeon: Parminder Lynch DO;  Location: [facility]    ESOPHAGOGASTRODUODENOSCOPY, WITH BOTULINUM TOXIN INJECTION  2022    Procedure: Esophagogastroduodenoscopy, With Botulinum Toxin Injection;  Surgeon: Parminder Lynch DO;  Location: [facility]    ESOPHAGOSCOPY, GASTROSCOPY, DUODENOSCOPY (EGD), COMBINED N/A 2022    Procedure: ESOPHAGOGASTRODUODENOSCOPY, WITH BIOPSY;  Surgeon: Parminder Lynch DO;  Location: [facility]    HYSTERECTOMY      IR LUMBAR PUNCTURE  2023    REVISION RIAN-EN-Y  2014    Laura    TOTAL KNEE ARTHROPLASTY Left 2019    TUBAL LIGATION        Allergies   Allergen Reactions    Cephalexin Hives     Vs cherries. Seen  urgency room.    Aspirin Other (See Comments)     Hx gastric bypass    Hydrocodone Other (See Comments)     Itching    Latex Unknown     Itchy    Nsaids Other (See Comments)     Hx of gastric bypass    Vicodin [Hydrocodone-Acetaminophen] Unknown     Itchy      Social History     Tobacco Use    Smoking status:  Former    Smokeless tobacco: Never    Tobacco comments:     quit over 25 years ago   Substance Use Topics    Alcohol use: Yes     Comment: Alcoholic Drinks/day: Rarely      Wt Readings from Last 1 Encounters:   04/15/25 88.7 kg (195 lb 9.6 oz)        Anesthesia Evaluation   Pt has had prior anesthetic. Type: General and MAC.        ROS/MED HX  ENT/Pulmonary:       Neurologic:  - neg neurologic ROS     Cardiovascular: Comment: Denies CHF - neg cardiovascular ROS     METS/Exercise Tolerance:     Hematologic:  - neg hematologic  ROS     Musculoskeletal:  - neg musculoskeletal ROS     GI/Hepatic:     (+) GERD,                   Renal/Genitourinary:  - neg Renal ROS     Endo:     (+)               Obesity,       Psychiatric/Substance Use:     (+)    H/O chronic opioid use .     Infectious Disease:  - neg infectious disease ROS     Malignancy:  - neg malignancy ROS     Other:  - neg other ROS            Physical Exam  Airway  Mallampati: II  TM distance: >3 FB  Neck ROM: full  Upper bite lip test: II  Mouth opening: >= 4 cm    Cardiovascular - normal exam  Rhythm: regular  Rate: normal rate   Comments: Denies CHF  Dental   (+) Multiple crowns, permanent bridges      Pulmonary - normal examBreath sounds clear to auscultation        Neurological - normal exam  She appears awake, alert and oriented x3.    Other Findings       OUTSIDE LABS:  CBC:   Lab Results   Component Value Date    WBC 7.9 10/18/2024    WBC 10.1 05/08/2023    HGB 12.7 10/18/2024    HGB 12.5 05/08/2023    HCT 39.8 10/18/2024    HCT 38.8 05/08/2023     10/18/2024     05/08/2023     BMP:   Lab Results   Component Value Date     10/18/2024     05/08/2023    POTASSIUM 4.1 10/18/2024    POTASSIUM 3.8 05/08/2023    CHLORIDE 101 10/18/2024    CHLORIDE 102 05/08/2023    CO2 29 10/18/2024    CO2 27 05/08/2023    BUN 18.0 10/18/2024    BUN 10.9 05/08/2023    CR 0.71 10/18/2024    CR 0.99 (H) 05/08/2023     (H) 06/10/2025    GLC 91  "10/18/2024     COAGS: No results found for: \"PTT\", \"INR\", \"FIBR\"  POC: No results found for: \"BGM\", \"HCG\", \"HCGS\"  HEPATIC:   Lab Results   Component Value Date    ALBUMIN 4.6 10/18/2024    PROTTOTAL 8.1 10/18/2024    ALT 43 10/18/2024    AST 65 (H) 10/18/2024    ALKPHOS 316 (H) 10/18/2024    BILITOTAL 0.5 10/18/2024     OTHER:   Lab Results   Component Value Date    A1C 5.4 10/18/2024    GEN 10.0 10/18/2024    TSH 16.79 (H) 04/21/2022       Anesthesia Plan    ASA Status:  2      NPO Status: NPO Appropriate   Anesthesia Type: MAC.  Maintenance: TIVA.   Techniques and Equipment:       - Monitoring Plan: standard ASA monitoring     Consents    Anesthesia Plan(s) and associated risks, benefits, and realistic alternatives discussed. Questions answered and patient/representative(s) expressed understanding.     - Discussed:     - Discussed with:  Patient, family               Postoperative Care    Pain management: non-narcotic analgesics.     Comments:                   Vick Rheman MD    I have reviewed the pertinent notes and labs in the chart from the past 30 days and (re)examined the patient.  Any updates or changes from those notes are reflected in this note.    Clinically Significant Risk Factors Present on Admission                   # Hypertension: Home medication list includes antihypertensive(s)                            "

## 2025-06-10 NOTE — OR NURSING
Pt underwent EGD with esophageal balloon dilation to 21mm and esophageal botox injection under MAC. Specimens: none. Pt transferred to recovery and report given to endo RN.       Yesenia Granados RN

## 2025-06-10 NOTE — ANESTHESIA CARE TRANSFER NOTE
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    Anesthesia Type:   MAC     Note:    Oropharynx: oropharynx clear of all foreign objects and spontaneously breathing  Level of Consciousness: awake  Oxygen Supplementation: room air    Independent Airway: airway patency satisfactory and stable  Dentition: dentition unchanged  Vital Signs Stable: post-procedure vital signs reviewed and stable  Report to RN Given: handoff report given  Patient transferred to: Phase II    Handoff Report: Identifed the Patient, Identified the Reponsible Provider, Reviewed the pertinent medical history, Discussed the surgical course, Reviewed Intra-OP anesthesia mangement and issues during anesthesia, Set expectations for post-procedure period and Allowed opportunity for questions and acknowledgement of understanding  Vitals:  Vitals Value Taken Time   /78 06/10/25 12:04   Temp     Pulse 82 06/10/25 12:04   Resp 16 06/10/25 12:04   SpO2 99 % 06/10/25 12:04   Vitals shown include unfiled device data.    Electronically Signed By: BRADLY Jacob CRNA  Katie 10, 2025  12:05 PM

## 2025-06-10 NOTE — H&P
Yuliana BOWER Marcello  [id number]  female  64 year old      Reason for procedure/surgery: egd, dysphagia, botox and dilation     Patient Active Problem List   Diagnosis    Congestive heart failure, unspecified HF chronicity, unspecified heart failure type (H)    Esophageal dyskinesia    Non-cardiac chest pain    Regurgitation of food    Esophageal dysphagia    Morbid obesity (H)    Elevated TSH    Elevated liver enzymes    Alkaline phosphatase elevation    Hepatic steatosis       Past Surgical History:    Past Surgical History:   Procedure Laterality Date    ARTHROSCOPY KNEE WITH MENISCAL REPAIR Left 01/2019    CHOLECYSTECTOMY      ESOPHAGEAL BALLOON PROVOCATION STUDY N/A 4/26/2022    Procedure: ESOPHAGEAL BALLOON PROVOCATION STUDY;  Surgeon: Parminder Lynch DO;  Location: [facility]    ESOPHAGOGASTRODUODENOSCOPY, WITH BOTULINUM TOXIN INJECTION  4/26/2022    Procedure: Esophagogastroduodenoscopy, With Botulinum Toxin Injection;  Surgeon: Parminder Lynch DO;  Location: [facility]    ESOPHAGOSCOPY, GASTROSCOPY, DUODENOSCOPY (EGD), COMBINED N/A 4/26/2022    Procedure: ESOPHAGOGASTRODUODENOSCOPY, WITH BIOPSY;  Surgeon: Parminder Lynch DO;  Location: [facility]    HYSTERECTOMY      IR LUMBAR PUNCTURE  11/17/2023    REVISION RIAN-EN-Y  4/23/2014    [facility]    TOTAL KNEE ARTHROPLASTY Left 03/2019    TUBAL LIGATION         Past Medical History:   Past Medical History:   Diagnosis Date    Asthma     Back pain     Congestive heart failure (H)     COPD (chronic obstructive pulmonary disease) (H)     Degenerative disc disease     Diabetes type 2, controlled (H)     Dyslipidemia     Esophageal dyskinesia     GERD (gastroesophageal reflux disease)     Gout     Hepatic steatosis     nodular appearance suggesting fibrosis/cirrhosis    Hyperparathyroidism     Hyperparathyroidism, secondary     Hypertension     Low bone mass     Metabolic syndrome     Morbid obesity (H)     Osteoarthritis     Reflux        Social History:   Social History     Tobacco Use     Smoking status: Former    Smokeless tobacco: Never    Tobacco comments:     quit over 25 years ago   Substance Use Topics    Alcohol use: Yes     Comment: Alcoholic Drinks/day: Rarely       Family History:   Family History   Problem Relation Age of Onset    Diabetes Mother     Heart Disease Mother     Hyperlipidemia Mother     Hypertension Mother        Allergies:   Allergies   Allergen Reactions    Cephalexin Hives     Vs cherries. Seen 7/4 urgency room.    Aspirin Other (See Comments)     Hx gastric bypass    Hydrocodone Other (See Comments)     Itching    Latex Unknown     Itchy    Nsaids Other (See Comments)     Hx of gastric bypass    Vicodin [Hydrocodone-Acetaminophen] Unknown     Itchy       Active Medications:   No current outpatient medications on file.       Systemic Review:   CONSTITUTIONAL: NEGATIVE for fever, chills, change in weight  ENT/MOUTH: NEGATIVE for ear, mouth and throat problems  RESP: NEGATIVE for significant cough or SOB  CV: NEGATIVE for chest pain, palpitations or peripheral edema    Physical Examination:   Vital Signs: BP (!) 148/90   Resp 18   SpO2 98%   GENERAL: healthy, alert and no distress  NECK: no adenopathy, no asymmetry, masses, or scars  RESP: lungs clear to auscultation - no rales, rhonchi or wheezes  CV: regular rate and rhythm, normal S1 S2, no S3 or S4, no murmur, click or rub, no peripheral edema and peripheral pulses strong  ABDOMEN: soft, nontender, no hepatosplenomegaly, no masses and bowel sounds normal  MS: no gross musculoskeletal defects noted, no edema    I examined patient s dentition and informed the patient that dental injuries are a risk of any anesthetic management. No concerns were noted with this patient's dentition. . The patient has consented to proceed with the procedure.    Plan: Appropriate to proceed as scheduled.      Parminder Lynch DO  6/10/2025    PCP:  Luan Mccray

## 2025-07-07 DIAGNOSIS — K22.4 JACKHAMMER ESOPHAGUS: ICD-10-CM

## 2025-07-07 DIAGNOSIS — K22.4 ESOPHAGEAL DYSKINESIA: ICD-10-CM

## 2025-07-07 DIAGNOSIS — R13.19 ESOPHAGEAL DYSPHAGIA: ICD-10-CM

## 2025-07-07 RX ORDER — OMEPRAZOLE 40 MG/1
40 CAPSULE, DELAYED RELEASE ORAL DAILY
Qty: 90 CAPSULE | Refills: 3 | Status: SHIPPED | OUTPATIENT
Start: 2025-07-07

## 2025-07-14 DIAGNOSIS — E66.01 MORBID OBESITY (H): Primary | ICD-10-CM

## 2025-07-14 DIAGNOSIS — G47.33 MODERATE OBSTRUCTIVE SLEEP APNEA: ICD-10-CM

## 2025-07-14 NOTE — TELEPHONE ENCOUNTER
Patient is doing well on her Zepbound and would like to go up in dose.  She will now be on the 10 mg.  Cecelia Villagomez RN

## 2025-08-11 ENCOUNTER — TELEPHONE (OUTPATIENT)
Dept: SURGERY | Facility: CLINIC | Age: 65
End: 2025-08-11
Payer: COMMERCIAL

## 2025-08-11 DIAGNOSIS — E66.01 MORBID OBESITY (H): ICD-10-CM

## 2025-08-11 DIAGNOSIS — G47.33 MODERATE OBSTRUCTIVE SLEEP APNEA: ICD-10-CM

## (undated) RX ORDER — ONDANSETRON 2 MG/ML
INJECTION INTRAMUSCULAR; INTRAVENOUS
Status: DISPENSED
Start: 2022-04-26

## (undated) RX ORDER — PROPOFOL 10 MG/ML
INJECTION, EMULSION INTRAVENOUS
Status: DISPENSED
Start: 2022-04-26

## (undated) RX ORDER — LIDOCAINE HYDROCHLORIDE 20 MG/ML
SOLUTION OROPHARYNGEAL
Status: DISPENSED
Start: 2022-02-17

## (undated) RX ORDER — LIDOCAINE HYDROCHLORIDE 20 MG/ML
INJECTION, SOLUTION EPIDURAL; INFILTRATION; INTRACAUDAL; PERINEURAL
Status: DISPENSED
Start: 2022-04-26